# Patient Record
Sex: MALE | Race: OTHER | Employment: OTHER | URBAN - METROPOLITAN AREA
[De-identification: names, ages, dates, MRNs, and addresses within clinical notes are randomized per-mention and may not be internally consistent; named-entity substitution may affect disease eponyms.]

---

## 2017-03-20 PROBLEM — I71.20 THORACIC AORTIC ANEURYSM WITHOUT RUPTURE: Status: ACTIVE | Noted: 2017-03-20

## 2017-03-20 PROBLEM — I10 ESSENTIAL HYPERTENSION WITH GOAL BLOOD PRESSURE LESS THAN 130/80: Status: ACTIVE | Noted: 2017-03-20

## 2017-03-20 PROBLEM — I71.2 THORACIC AORTIC ANEURYSM WITHOUT RUPTURE (HCC): Status: ACTIVE | Noted: 2017-03-20

## 2017-03-20 PROBLEM — I71.20 THORACIC AORTIC ANEURYSM WITHOUT RUPTURE (HCC): Status: ACTIVE | Noted: 2017-03-20

## 2017-04-06 ENCOUNTER — HOSPITAL ENCOUNTER (INPATIENT)
Facility: HOSPITAL | Age: 77
LOS: 1 days | Discharge: HOME | DRG: 176 | End: 2017-04-07
Attending: EMERGENCY MEDICINE | Admitting: INTERNAL MEDICINE
Payer: MEDICARE

## 2017-04-06 DIAGNOSIS — I26.99 OTHER PULMONARY EMBOLISM WITHOUT ACUTE COR PULMONALE, UNSPECIFIED CHRONICITY (HCC): Primary | ICD-10-CM

## 2017-04-06 PROCEDURE — 85610 PROTHROMBIN TIME: CPT | Performed by: EMERGENCY MEDICINE

## 2017-04-06 PROCEDURE — 93010 ELECTROCARDIOGRAM REPORT: CPT

## 2017-04-06 PROCEDURE — 85730 THROMBOPLASTIN TIME PARTIAL: CPT | Performed by: EMERGENCY MEDICINE

## 2017-04-06 PROCEDURE — 93005 ELECTROCARDIOGRAM TRACING: CPT

## 2017-04-06 PROCEDURE — 85025 COMPLETE CBC W/AUTO DIFF WBC: CPT | Performed by: EMERGENCY MEDICINE

## 2017-04-06 PROCEDURE — 99285 EMERGENCY DEPT VISIT HI MDM: CPT

## 2017-04-06 PROCEDURE — 83880 ASSAY OF NATRIURETIC PEPTIDE: CPT | Performed by: EMERGENCY MEDICINE

## 2017-04-06 PROCEDURE — 84484 ASSAY OF TROPONIN QUANT: CPT | Performed by: EMERGENCY MEDICINE

## 2017-04-06 PROCEDURE — 96372 THER/PROPH/DIAG INJ SC/IM: CPT

## 2017-04-06 PROCEDURE — 80053 COMPREHEN METABOLIC PANEL: CPT | Performed by: EMERGENCY MEDICINE

## 2017-04-06 PROCEDURE — 36415 COLL VENOUS BLD VENIPUNCTURE: CPT

## 2017-04-06 RX ORDER — ENOXAPARIN SODIUM 100 MG/ML
100 INJECTION SUBCUTANEOUS ONCE
Status: COMPLETED | OUTPATIENT
Start: 2017-04-06 | End: 2017-04-06

## 2017-04-06 RX ORDER — POLYETHYLENE GLYCOL 3350 17 G/17G
17 POWDER, FOR SOLUTION ORAL DAILY PRN
Status: DISCONTINUED | OUTPATIENT
Start: 2017-04-06 | End: 2017-04-07

## 2017-04-06 RX ORDER — ENOXAPARIN SODIUM 100 MG/ML
1 INJECTION SUBCUTANEOUS EVERY 12 HOURS SCHEDULED
Status: DISCONTINUED | OUTPATIENT
Start: 2017-04-07 | End: 2017-04-07

## 2017-04-06 RX ORDER — TERAZOSIN 5 MG/1
5 CAPSULE ORAL NIGHTLY
Status: DISCONTINUED | OUTPATIENT
Start: 2017-04-06 | End: 2017-04-07

## 2017-04-06 RX ORDER — ACETAMINOPHEN 325 MG/1
650 TABLET ORAL EVERY 6 HOURS PRN
Status: DISCONTINUED | OUTPATIENT
Start: 2017-04-06 | End: 2017-04-07

## 2017-04-06 RX ORDER — ONDANSETRON 2 MG/ML
4 INJECTION INTRAMUSCULAR; INTRAVENOUS EVERY 6 HOURS PRN
Status: DISCONTINUED | OUTPATIENT
Start: 2017-04-06 | End: 2017-04-07

## 2017-04-06 RX ORDER — POTASSIUM CHLORIDE 20 MEQ/1
40 TABLET, EXTENDED RELEASE ORAL ONCE
Status: COMPLETED | OUTPATIENT
Start: 2017-04-06 | End: 2017-04-06

## 2017-04-06 RX ORDER — LISINOPRIL 20 MG/1
20 TABLET ORAL NIGHTLY
Status: DISCONTINUED | OUTPATIENT
Start: 2017-04-06 | End: 2017-04-07

## 2017-04-06 RX ORDER — BISACODYL 10 MG
10 SUPPOSITORY, RECTAL RECTAL
Status: DISCONTINUED | OUTPATIENT
Start: 2017-04-06 | End: 2017-04-07

## 2017-04-07 ENCOUNTER — APPOINTMENT (OUTPATIENT)
Dept: CV DIAGNOSTICS | Facility: HOSPITAL | Age: 77
DRG: 176 | End: 2017-04-07
Attending: HOSPITALIST
Payer: MEDICARE

## 2017-04-07 VITALS
SYSTOLIC BLOOD PRESSURE: 167 MMHG | BODY MASS INDEX: 30 KG/M2 | WEIGHT: 195 LBS | DIASTOLIC BLOOD PRESSURE: 84 MMHG | RESPIRATION RATE: 17 BRPM | TEMPERATURE: 98 F | HEART RATE: 62 BPM | OXYGEN SATURATION: 100 %

## 2017-04-07 PROCEDURE — 83735 ASSAY OF MAGNESIUM: CPT | Performed by: HOSPITALIST

## 2017-04-07 PROCEDURE — 80048 BASIC METABOLIC PNL TOTAL CA: CPT | Performed by: HOSPITALIST

## 2017-04-07 PROCEDURE — 85027 COMPLETE CBC AUTOMATED: CPT | Performed by: HOSPITALIST

## 2017-04-07 PROCEDURE — 93306 TTE W/DOPPLER COMPLETE: CPT

## 2017-04-07 PROCEDURE — 84132 ASSAY OF SERUM POTASSIUM: CPT | Performed by: HOSPITALIST

## 2017-04-07 PROCEDURE — 93306 TTE W/DOPPLER COMPLETE: CPT | Performed by: INTERNAL MEDICINE

## 2017-04-07 RX ORDER — POTASSIUM CHLORIDE 20 MEQ/1
40 TABLET, EXTENDED RELEASE ORAL ONCE
Status: COMPLETED | OUTPATIENT
Start: 2017-04-07 | End: 2017-04-07

## 2017-04-07 RX ORDER — TERAZOSIN 5 MG/1
5 CAPSULE ORAL NIGHTLY
Status: DISCONTINUED | OUTPATIENT
Start: 2017-04-07 | End: 2017-04-07

## 2017-04-07 RX ORDER — AMITRIPTYLINE HYDROCHLORIDE 10 MG/1
10 TABLET, FILM COATED ORAL NIGHTLY
Status: DISCONTINUED | OUTPATIENT
Start: 2017-04-07 | End: 2017-04-07

## 2017-04-07 NOTE — H&P
[facility] 37 Patient Status:  Observation    10/2/1940 MRN [id number]   [facility] 7NE-A Attending Oswaldo Roberts MD   Hosp Day # 1 PCP Jose Antonio Wray MD     Cc: abnormal CTA    History of Present [place] Cancer Father    • Cancer Mother      Social History:   reports that he quit smoking about 59 years ago. His smoking use included Cigarettes. He has never used smokeless tobacco. He reports that he drinks alcohol.  He reports that he does not use illicit dr 6. 0   HGB  14.3  13.4   MCV  84.1  85.5   PLT  168.0  159.0   INR  1.04   --        Recent Labs   Lab  04/06/17 2003 04/07/17   0511   NA  140  140   K  3.0*   --    CL  104  106   CO2  29.0  28.0   BUN  19  19   CREATSERUM  1.19  1.05   CA  8.7  8.9 UPPER ABDOMEN:  A small cyst is seen within the pancreatic tail measuring 1 cm. Low-density foci within the kidneys are too small to characterize. BONES:  Modest multilevel degenerative changes of the thoracolumbar spine are noted.  No aggressive osseous le

## 2017-04-07 NOTE — PLAN OF CARE
Assumed care @ 0700. Pt a/o x4, VSS. Tele SR. No acute respiratory distress noted. Denies any pain. Pt frequently ambulated in the aguilar. (-)BM, (+)BS. No other complaints made. Will continue to monitor.           NURSING DISCHARGE NOTE    Discharged

## 2017-04-07 NOTE — ED PROVIDER NOTES
Patient Seen in: [facility] Emergency Department    History   Patient presents with:  Abnormal Result (metabolic, cardiac)    Stated Complaint: states had CT today, told to come for (+) PE    HPI  Patient is a 14-year-old male who has history of a t MG Oral Tab,  Take 1 tablet (20 mg total) by mouth daily. omeprazole 20 MG Oral Capsule Delayed Release,  Take 1 capsule (20 mg total) by mouth once daily. Doxazosin Mesylate (CARDURA) 4 MG Oral Tab,  Take 1 tablet (4 mg total) by mouth nightly.    Garcia Sherice refill. No calf tenderness or edema  SKIN: No rash, good turgor. NEURO: Patient answers questions appropriately. No focal deficits appreciated.            ED Course     Labs Reviewed   COMP METABOLIC PANEL (14) - Abnormal; Notable for the following: Ailin Aranda from pulmonology who recommended Lovenox milligram per kilogram every 12 hours. Patient will be admitted for further evaluation.   I did speak with the [facility] hospitalist.        Disposition and Plan     Clinical Impression:  Other pulmonary embolism w

## 2017-04-07 NOTE — CERTIFICATION
**Certification    PHYSICIAN Certification of Need for Inpatient Hospitalization    Based on the his current state of illness, Stephanie Saeed requires inpatient hospitalization for his PE.   This requires inpatient medical treatment because the patient's se

## 2017-04-07 NOTE — DISCHARGE SUMMARY
General Medicine Discharge Summary     Patient ID:  Efren Sutton  68year old  10/2/1940    Admit date: 4/6/2017    Discharge date and time: 4/7/2017    Attending Physician: MD Miguel Morin        Consults: IP CONSULT TO PULMONOLOGY  IP CONSULT TO HOSPITALIST  IP CONSULT TO SOCIAL WORK    Radiology reports:    Cta Chest For Aortic Aneurysm Or Diss(cont Only)(cpt=71275)            (cpt=71275)    4/6/2017  CT ANGIOGRAPHY CHEST CLINICAL INFORMAT patent. Subsegmental areas of atelectasis are noted within the lung bases. No focal airspace consolidation is identified. 4/6/2017  IMPRESSION:  1. Acute bilateral pulmonary emboli. 2.  Mild ascending thoracic aortic aneurysm.  3.  Calcific atheroscler

## 2017-04-07 NOTE — ED INITIAL ASSESSMENT (HPI)
76YM c/c of PE's Pt state he had a CT today and was dx with bilateral PE Pt denies any symptoms at this time

## 2017-04-07 NOTE — PLAN OF CARE
Pt admitted at 2245. Safety precautions initiated. Bed in low position. Call light within reach. Pt was oriented to room. Pt updated of plan of care. Pt verbalized understanding.

## 2017-04-07 NOTE — CONSULTS
Pulmonary H&P/Consult       NAME: [address] [place] [address] Street: [id number] - MRN: [id number] - Age: 68year old - :  10/2/1940    Date of Admission: 2017  7:37 PM  Admission Diagnosis: Other pulmonary embolism without acute cor pulmonale, unspecified chr admission:  Atenolol-Chlorthalidone 50-25 MG Oral Tab Take 1 tablet by mouth once daily.  Disp: 90 tablet Rfl: 1 4/6/2017 at Unknown time   Amitriptyline HCl 10 MG Oral Tab TAKE 1 TABLET BY MOUTH NIGHTLY Disp: 90 tablet Rfl: 1 4/5/2017 at Unknown time   lis Scheduled Medication:  • Amitriptyline HCl  10 mg Oral Nightly   • rivaroxaban  15 mg Oral BID with meals   • atenolol-chlorthalidone (TENORETIC 50/25) combination tablet   Oral Daily Beta Blocker   • lisinopril  20 mg Oral Nightly   • Terazosin HCl mucosa, and tongue normal; teeth and gums normal   Neck:   Supple, symmetrical, trachea midline, no adenopathy;        thyroid:  No enlargement/tenderness/nodules; no carotid    bruit or JVD   Back:     Symmetric, no curvature, ROM normal, no CVA tendernes ----------    Imaging: CT chest reviewed- zane emboli noted    ASSESSMENT/PLAN:    1.  Zane PEs  -found incidentally on CT of aorta  -RF of recent car/plane trips  -echo done and results are pending  -start xarelto- continue for 3 mths  -stable to d/c home

## 2017-04-10 ENCOUNTER — TELEPHONE (OUTPATIENT)
Dept: CARDIOLOGY UNIT | Facility: HOSPITAL | Age: 77
End: 2017-04-10

## 2017-04-10 NOTE — PROGRESS NOTES
Received prior auth request for Xarelto.  Discussed with pharmacy and insurance company- had 3 prescriptions apparently- 1) for Xarelto 15mg BID for 21 days, 2) for Xarelto 20mg daily after completing prescription #1, and 3) Xarelto 10mg tabs 2 tabs by linda

## 2017-12-05 NOTE — OR NURSING
Received a call from Dr. Eliud Lorenz stating she had reviewed the EKG from April and found that is was satisfactory for the upcoming GI procedure and that there was no need to repeat EKG.

## 2017-12-11 ENCOUNTER — HOSPITAL ENCOUNTER (OUTPATIENT)
Facility: HOSPITAL | Age: 77
Setting detail: HOSPITAL OUTPATIENT SURGERY
Discharge: HOME | End: 2017-12-11
Attending: INTERNAL MEDICINE | Admitting: INTERNAL MEDICINE
Payer: MEDICARE

## 2017-12-11 ENCOUNTER — SURGERY (OUTPATIENT)
Age: 77
End: 2017-12-11

## 2017-12-11 ENCOUNTER — ANESTHESIA EVENT (OUTPATIENT)
Dept: ENDOSCOPY | Facility: HOSPITAL | Age: 77
End: 2017-12-11
Payer: MEDICARE

## 2017-12-11 ENCOUNTER — ANESTHESIA (OUTPATIENT)
Dept: ENDOSCOPY | Facility: HOSPITAL | Age: 77
End: 2017-12-11
Payer: MEDICARE

## 2017-12-11 VITALS
SYSTOLIC BLOOD PRESSURE: 113 MMHG | HEIGHT: 67 IN | BODY MASS INDEX: 31.39 KG/M2 | WEIGHT: 200 LBS | TEMPERATURE: 97 F | DIASTOLIC BLOOD PRESSURE: 63 MMHG | RESPIRATION RATE: 16 BRPM | HEART RATE: 55 BPM | OXYGEN SATURATION: 96 %

## 2017-12-11 DIAGNOSIS — K86.89 DILATION OF PANCREATIC DUCT: ICD-10-CM

## 2017-12-11 PROCEDURE — 88305 TISSUE EXAM BY PATHOLOGIST: CPT | Performed by: INTERNAL MEDICINE

## 2017-12-11 PROCEDURE — 82150 ASSAY OF AMYLASE: CPT | Performed by: INTERNAL MEDICINE

## 2017-12-11 PROCEDURE — 82378 CARCINOEMBRYONIC ANTIGEN: CPT | Performed by: INTERNAL MEDICINE

## 2017-12-11 PROCEDURE — 0DB38ZX EXCISION OF LOWER ESOPHAGUS, VIA NATURAL OR ARTIFICIAL OPENING ENDOSCOPIC, DIAGNOSTIC: ICD-10-PCS | Performed by: INTERNAL MEDICINE

## 2017-12-11 PROCEDURE — 0FBG4ZX EXCISION OF PANCREAS, PERCUTANEOUS ENDOSCOPIC APPROACH, DIAGNOSTIC: ICD-10-PCS | Performed by: INTERNAL MEDICINE

## 2017-12-11 PROCEDURE — BF4CZZZ ULTRASONOGRAPHY OF HEPATOBILIARY SYSTEM, ALL: ICD-10-PCS | Performed by: INTERNAL MEDICINE

## 2017-12-11 RX ORDER — NALOXONE HYDROCHLORIDE 0.4 MG/ML
80 INJECTION, SOLUTION INTRAMUSCULAR; INTRAVENOUS; SUBCUTANEOUS AS NEEDED
Status: DISCONTINUED | OUTPATIENT
Start: 2017-12-11 | End: 2017-12-11

## 2017-12-11 RX ORDER — SODIUM CHLORIDE, SODIUM LACTATE, POTASSIUM CHLORIDE, CALCIUM CHLORIDE 600; 310; 30; 20 MG/100ML; MG/100ML; MG/100ML; MG/100ML
INJECTION, SOLUTION INTRAVENOUS CONTINUOUS
Status: DISCONTINUED | OUTPATIENT
Start: 2017-12-11 | End: 2017-12-11

## 2017-12-11 RX ORDER — LEVOFLOXACIN 5 MG/ML
250 INJECTION, SOLUTION INTRAVENOUS ONCE
Status: DISCONTINUED | OUTPATIENT
Start: 2017-12-11 | End: 2017-12-11

## 2017-12-11 NOTE — ANESTHESIA PREPROCEDURE EVALUATION
PRE-OP EVALUATION    Patient Name: Phi Jordan    Pre-op Diagnosis: Dilation of pancreatic duct [K86.89]    Procedure(s):  ENDOSCOPIC ULTRASOUND WITH FINE NEEDLE ASPIRATION  AND ANTIBIOTICS     Surgeon(s) and Role:     Janneth Alamo MD - Primary Smoker     Types: Cigarettes    Quit date: 1/1/1958    Smokeless tobacco: Never Used    Alcohol use Yes    Comment: 2-3/MONTH       Drug use: No     Available pre-op labs reviewed.        Lab Results  Component Value Date    12/05/2017   K 3.30 (L) 12

## 2017-12-11 NOTE — BRIEF OP NOTE
Pre-Operative Diagnosis: Dilation of pancreatic duct [K86.89]     Post-Operative Diagnosis: HIATAL HERNIA, PROMINENT PROXIMAL ESOPHAGUS VEIN,     Procedure Performed:   Procedure(s):  ENDOSCOPIC ULTRASOUND WITH FINE NEEDLE ASPIRATION  , RED PATH  ESOPHA

## 2017-12-11 NOTE — OPERATIVE REPORT
[facility]    PATIENT'S NAME: Shad Mekhi   ATTENDING PHYSICIAN: Estelle Shah M.D. OPERATING PHYSICIAN: Estelle Shah M.D.    PATIENT ACCOUNT#:   [de-identified]    LOCATION:  [facility] ROOMS 4 EDWP  MEDICAL RECORD #:   [id number]       D ascending aortic aneurysm. The mucosa appeared unremarkable. The distal part of the esophagus appeared unremarkable. Random biopsies were taken from the distal esophagus. Of note, no esophageal varices were seen in the distal part.   The stomach examina Of note, the visualized portion of the right and left lobe liver showed some fatty changes. IMPRESSION:  Findings as described above. RECOMMENDATION:    1. Check final biopsy results.   2.   Continue close monitoring of the pancreatic cyst.  3.   We

## 2017-12-11 NOTE — ANESTHESIA POSTPROCEDURE EVALUATION
[facility] Patient Status:  Hospital Outpatient Surgery   Age/Gender 68year old male MRN [id number]   Location [facility]. Attending Tomy Corrales MD   Hosp Day # 0 PCP Sia Lenz MD       Anesthesia Post-op Note

## 2017-12-13 NOTE — PROGRESS NOTES
Biopsy from esophagus was normal. We need to get CT of the chest with IV to assess the prominent veins seen at time of endoscopy

## 2017-12-19 ENCOUNTER — CHARTING TRANS (OUTPATIENT)
Dept: OTHER | Age: 77
End: 2017-12-19

## 2018-03-28 ENCOUNTER — PRIOR ORIGINAL RECORDS (OUTPATIENT)
Dept: OTHER | Age: 78
End: 2018-03-28

## 2018-03-28 PROBLEM — I26.99 OTHER PULMONARY EMBOLISM WITHOUT ACUTE COR PULMONALE, UNSPECIFIED CHRONICITY (HCC): Status: RESOLVED | Noted: 2017-04-06 | Resolved: 2018-03-28

## 2018-03-28 PROBLEM — I26.99 PULMONARY EMBOLUS (HCC): Status: RESOLVED | Noted: 2017-04-06 | Resolved: 2018-03-28

## 2018-03-28 PROBLEM — Z86.711 HISTORY OF PULMONARY EMBOLISM: Status: ACTIVE | Noted: 2018-03-28

## 2018-03-28 PROBLEM — I35.8 AORTIC VALVE SCLEROSIS: Status: ACTIVE | Noted: 2018-03-28

## 2018-11-16 ENCOUNTER — PRIOR ORIGINAL RECORDS (OUTPATIENT)
Dept: OTHER | Age: 78
End: 2018-11-16

## 2018-11-16 ENCOUNTER — MYAURORA ACCOUNT LINK (OUTPATIENT)
Dept: OTHER | Age: 78
End: 2018-11-16

## 2018-11-16 LAB
ALBUMIN: 4.2 G/DL
ALKALINE PHOSPHATATE(ALK PHOS): 46 IU/L
ALKALINE PHOSPHATATE(ALK PHOS): 46 IU/L
ALT (SGPT): 23 U/L
AST (SGOT): 16 U/L
BILIRUBIN TOTAL: 0.92 MG/DL
BUN: 19 MG/DL
CALCIUM: 9.2 MG/DL
CALCIUM: 9.2 MG/DL
CHLORIDE: 102 MEQ/L
CHLORIDE: 102 MEQ/L
CHOLESTEROL, TOTAL: 168 MG/DL
CREATININE, SERUM: 1.32 MG/DL
GLUCOSE: 118 MG/DL
GLUCOSE: 118 MG/DL
HDL CHOLESTEROL: 35 MG/DL
LDL CHOLESTEROL: 97 MG/DL
POTASSIUM, SERUM: 3.4 MEQ/L
SGOT (AST): 16 IU/L
SGOT (AST): 16 IU/L
SGPT (ALT): 23 IU/L
SGPT (ALT): 23 IU/L
SODIUM: 142 MEQ/L
SODIUM: 142 MEQ/L
TRIGLYCERIDES: 180 MG/DL

## 2018-12-04 ENCOUNTER — HOSPITAL ENCOUNTER (OUTPATIENT)
Dept: CV DIAGNOSTICS | Facility: HOSPITAL | Age: 78
Discharge: HOME | End: 2018-12-04
Attending: INTERNAL MEDICINE

## 2018-12-04 ENCOUNTER — MYAURORA ACCOUNT LINK (OUTPATIENT)
Dept: OTHER | Age: 78
End: 2018-12-04

## 2018-12-04 DIAGNOSIS — I34.0 MITRAL VALVE INSUFFICIENCY, UNSPECIFIED ETIOLOGY: ICD-10-CM

## 2018-12-04 DIAGNOSIS — I10 HYPERTENSION, UNSPECIFIED TYPE: ICD-10-CM

## 2018-12-04 LAB
ALT (SGPT): 23 U/L
AST (SGOT): 16 U/L
CHOLESTEROL, TOTAL: 168 MG/DL
GLUCOSE: 118 MG/DL
HDL CHOLESTEROL: 35 MG/DL
HEMOGLOBIN A1C: 5.9 %
LDL CHOLESTEROL: 97 MG/DL
TRIGLYCERIDES: 180 MG/DL

## 2018-12-06 ENCOUNTER — PRIOR ORIGINAL RECORDS (OUTPATIENT)
Dept: OTHER | Age: 78
End: 2018-12-06

## 2018-12-10 ENCOUNTER — PRIOR ORIGINAL RECORDS (OUTPATIENT)
Dept: OTHER | Age: 78
End: 2018-12-10

## 2018-12-10 RX ORDER — AMLODIPINE BESYLATE 5 MG/1
5 TABLET ORAL DAILY
COMMUNITY
End: 2019-03-29

## 2018-12-10 NOTE — H&P
Tarsha Galarza  : 10/02/1940  ACCOUNT:  [id number]  [phone]  PCP: Dr. Vaughan Situ     TODAY'S DATE: 2018  DICTATED BY:  [Dr. Jennifer Trujillo: [high blood pressure.]    HPI:    [On 2018, Rosalinda Maguire, a 66year old male, p ALLERGIES: Penicillins - CLASS    MEDICATIONS: Selected prescriptions see below    VITAL SIGNS: [B/P - 190/92 , Pulse - 60, Weight -  203, Height -   66 , BMI - 32.8 ]    CONS: well developed, well nourished.  WEIGHT: BMI parameters reviewed and discuss anatomy. Amlodipine indications and potential side effects were discussed with the patient and his wife. They verbalize good understanding and wished to proceed with the above plan as outlined.   I will see him for follow-up again in 3 months, sooner as n

## 2018-12-11 ENCOUNTER — HOSPITAL ENCOUNTER (OUTPATIENT)
Dept: CV DIAGNOSTICS | Facility: HOSPITAL | Age: 78
Discharge: HOME | End: 2018-12-11
Attending: INTERNAL MEDICINE
Payer: MEDICARE

## 2018-12-11 ENCOUNTER — HOSPITAL ENCOUNTER (OUTPATIENT)
Dept: INTERVENTIONAL RADIOLOGY/VASCULAR | Facility: HOSPITAL | Age: 78
Discharge: HOME | End: 2018-12-11
Attending: INTERNAL MEDICINE | Admitting: INTERNAL MEDICINE
Payer: MEDICARE

## 2018-12-11 VITALS
HEIGHT: 68 IN | BODY MASS INDEX: 30.31 KG/M2 | SYSTOLIC BLOOD PRESSURE: 141 MMHG | WEIGHT: 200 LBS | HEART RATE: 51 BPM | OXYGEN SATURATION: 96 % | DIASTOLIC BLOOD PRESSURE: 69 MMHG | RESPIRATION RATE: 16 BRPM

## 2018-12-11 DIAGNOSIS — I34.0 MITRAL REGURGITATION: ICD-10-CM

## 2018-12-11 PROCEDURE — B24BZZ4 ULTRASONOGRAPHY OF HEART WITH AORTA, TRANSESOPHAGEAL: ICD-10-PCS | Performed by: INTERNAL MEDICINE

## 2018-12-11 PROCEDURE — 93312 ECHO TRANSESOPHAGEAL: CPT

## 2018-12-11 PROCEDURE — 93320 DOPPLER ECHO COMPLETE: CPT | Performed by: INTERNAL MEDICINE

## 2018-12-11 PROCEDURE — 99152 MOD SED SAME PHYS/QHP 5/>YRS: CPT

## 2018-12-11 PROCEDURE — 99153 MOD SED SAME PHYS/QHP EA: CPT

## 2018-12-11 PROCEDURE — 93325 DOPPLER ECHO COLOR FLOW MAPG: CPT | Performed by: INTERNAL MEDICINE

## 2018-12-11 RX ORDER — SODIUM CHLORIDE 9 MG/ML
INJECTION, SOLUTION INTRAVENOUS CONTINUOUS
Status: DISCONTINUED | OUTPATIENT
Start: 2018-12-11 | End: 2018-12-11

## 2018-12-11 RX ORDER — MIDAZOLAM HYDROCHLORIDE 1 MG/ML
INJECTION INTRAMUSCULAR; INTRAVENOUS
Status: COMPLETED
Start: 2018-12-11 | End: 2018-12-11

## 2018-12-11 RX ORDER — MIDAZOLAM HYDROCHLORIDE 1 MG/ML
INJECTION INTRAMUSCULAR; INTRAVENOUS
Status: DISCONTINUED
Start: 2018-12-11 | End: 2018-12-11 | Stop reason: WASHOUT

## 2018-12-11 RX ORDER — MIDAZOLAM HYDROCHLORIDE 1 MG/ML
1 INJECTION INTRAMUSCULAR; INTRAVENOUS ONCE AS NEEDED
Status: DISCONTINUED | OUTPATIENT
Start: 2018-12-11 | End: 2018-12-11

## 2018-12-11 RX ADMIN — MIDAZOLAM HYDROCHLORIDE 4 MG: 1 INJECTION INTRAMUSCULAR; INTRAVENOUS at 09:42:00

## 2018-12-11 RX ADMIN — SODIUM CHLORIDE: 9 INJECTION, SOLUTION INTRAVENOUS at 08:30:00

## 2018-12-11 NOTE — PROGRESS NOTES
[facility]  VANESSA Note    Batool Hendrickson Location:      MRN [id number]   Admission Date 12/11/2018 Operation Date 12/11/2018   Attending Physician Tori Cazares MD Operating Physician Foy Jeans, MD     Pre-Operative Diagnosis: Dilated aortic r

## 2018-12-11 NOTE — PROGRESS NOTES
Discharge instructions explained to the patient and family,questions were answered, vitals are stable, iv removed, patient went home in stable condition with family.

## 2018-12-14 ENCOUNTER — PRIOR ORIGINAL RECORDS (OUTPATIENT)
Dept: OTHER | Age: 78
End: 2018-12-14

## 2019-01-01 ENCOUNTER — EXTERNAL RECORD (OUTPATIENT)
Dept: HEALTH INFORMATION MANAGEMENT | Facility: OTHER | Age: 79
End: 2019-01-01

## 2019-02-20 ENCOUNTER — PRIOR ORIGINAL RECORDS (OUTPATIENT)
Dept: OTHER | Age: 79
End: 2019-02-20

## 2019-02-28 VITALS
BODY MASS INDEX: 31.22 KG/M2 | HEART RATE: 66 BPM | DIASTOLIC BLOOD PRESSURE: 62 MMHG | SYSTOLIC BLOOD PRESSURE: 138 MMHG | HEIGHT: 68 IN | WEIGHT: 206 LBS

## 2019-02-28 VITALS
DIASTOLIC BLOOD PRESSURE: 92 MMHG | BODY MASS INDEX: 32.62 KG/M2 | SYSTOLIC BLOOD PRESSURE: 190 MMHG | HEART RATE: 60 BPM | WEIGHT: 203 LBS | HEIGHT: 66 IN

## 2019-03-29 PROBLEM — K86.2 PANCREATIC CYST: Status: ACTIVE | Noted: 2019-03-29

## 2019-03-29 PROBLEM — I34.0 NON-RHEUMATIC MITRAL REGURGITATION: Status: ACTIVE | Noted: 2019-03-29

## 2019-03-29 PROBLEM — K86.2 PANCREATIC CYST (HCC): Status: ACTIVE | Noted: 2019-03-29

## 2019-04-03 RX ORDER — NICOTINE POLACRILEX 4 MG/1
GUM, CHEWING ORAL
COMMUNITY

## 2019-04-03 RX ORDER — AMLODIPINE BESYLATE 5 MG/1
TABLET ORAL
COMMUNITY
Start: 2018-11-16

## 2019-04-03 RX ORDER — IRBESARTAN 300 MG/1
TABLET ORAL
COMMUNITY
Start: 2018-11-16 | End: 2020-01-01 | Stop reason: ALTCHOICE

## 2019-04-03 RX ORDER — DOXAZOSIN MESYLATE 4 MG/1
TABLET ORAL
COMMUNITY
Start: 2018-11-16

## 2019-04-03 RX ORDER — ATENOLOL AND CHLORTHALIDONE TABLET 100; 25 MG/1; MG/1
TABLET ORAL
COMMUNITY
Start: 2018-11-16

## 2019-04-03 RX ORDER — AMITRIPTYLINE HYDROCHLORIDE 10 MG/1
TABLET, FILM COATED ORAL
COMMUNITY
Start: 2018-11-16

## 2019-04-03 RX ORDER — ELECTROLYTES/DEXTROSE
SOLUTION, ORAL ORAL
COMMUNITY
Start: 2018-11-16

## 2019-05-16 DIAGNOSIS — I10 BENIGN HYPERTENSION: Primary | ICD-10-CM

## 2019-05-16 DIAGNOSIS — I34.1 MITRAL VALVE PROLAPSE: ICD-10-CM

## 2019-05-29 ENCOUNTER — HOSPITAL ENCOUNTER (OUTPATIENT)
Dept: CV DIAGNOSTICS | Facility: HOSPITAL | Age: 79
Discharge: HOME | End: 2019-05-29
Attending: INTERNAL MEDICINE
Payer: MEDICARE

## 2019-05-29 DIAGNOSIS — I10 BENIGN HYPERTENSION: ICD-10-CM

## 2019-05-29 DIAGNOSIS — I34.1 MITRAL VALVE PROLAPSE: ICD-10-CM

## 2019-05-29 PROCEDURE — 93306 TTE W/DOPPLER COMPLETE: CPT | Performed by: INTERNAL MEDICINE

## 2019-05-31 ENCOUNTER — TELEPHONE (OUTPATIENT)
Dept: CARDIOLOGY | Age: 79
End: 2019-05-31

## 2019-06-18 PROBLEM — I34.0 MITRAL REGURGITATION: Status: ACTIVE | Noted: 2019-06-18

## 2019-06-18 PROBLEM — I34.1 MITRAL VALVE PROLAPSE: Status: ACTIVE | Noted: 2019-06-18

## 2019-06-18 SDOH — HEALTH STABILITY: MENTAL HEALTH: HOW OFTEN DO YOU HAVE A DRINK CONTAINING ALCOHOL?: NEVER

## 2019-06-19 ENCOUNTER — OFFICE VISIT (OUTPATIENT)
Dept: CARDIOLOGY | Age: 79
End: 2019-06-19

## 2019-06-19 VITALS
SYSTOLIC BLOOD PRESSURE: 136 MMHG | WEIGHT: 206 LBS | DIASTOLIC BLOOD PRESSURE: 60 MMHG | BODY MASS INDEX: 32.33 KG/M2 | HEIGHT: 67 IN | HEART RATE: 60 BPM

## 2019-06-19 DIAGNOSIS — I34.0 NON-RHEUMATIC MITRAL REGURGITATION: ICD-10-CM

## 2019-06-19 DIAGNOSIS — I34.1 MITRAL VALVE PROLAPSE: Primary | ICD-10-CM

## 2019-06-19 PROCEDURE — 99215 OFFICE O/P EST HI 40 MIN: CPT | Performed by: INTERNAL MEDICINE

## 2019-06-19 ASSESSMENT — ENCOUNTER SYMPTOMS
BRUISES/BLEEDS EASILY: 0
SUSPICIOUS LESIONS: 0
ALLERGIC/IMMUNOLOGIC COMMENTS: NO NEW FOOD ALLERGIES
WEIGHT GAIN: 0
COUGH: 0
HEMATOCHEZIA: 0
FEVER: 0
CHILLS: 0
WEIGHT LOSS: 0
HEMOPTYSIS: 0

## 2019-07-02 ENCOUNTER — HOSPITAL ENCOUNTER (OUTPATIENT)
Dept: CV DIAGNOSTICS | Facility: HOSPITAL | Age: 79
Discharge: HOME | End: 2019-07-02
Attending: INTERNAL MEDICINE
Payer: MEDICARE

## 2019-07-02 DIAGNOSIS — I34.1 MVP (MITRAL VALVE PROLAPSE): ICD-10-CM

## 2019-07-02 DIAGNOSIS — I10 HTN (HYPERTENSION): ICD-10-CM

## 2019-07-02 PROCEDURE — 93018 CV STRESS TEST I&R ONLY: CPT | Performed by: INTERNAL MEDICINE

## 2019-07-02 PROCEDURE — 93017 CV STRESS TEST TRACING ONLY: CPT | Performed by: INTERNAL MEDICINE

## 2019-07-05 ENCOUNTER — TELEPHONE (OUTPATIENT)
Dept: CARDIOLOGY | Age: 79
End: 2019-07-05

## 2019-12-19 ENCOUNTER — HOSPITAL ENCOUNTER (OUTPATIENT)
Dept: CV DIAGNOSTICS | Facility: HOSPITAL | Age: 79
Discharge: HOME | End: 2019-12-19
Attending: INTERNAL MEDICINE
Payer: MEDICARE

## 2019-12-19 DIAGNOSIS — I34.1 MITRAL VALVE PROLAPSE: ICD-10-CM

## 2019-12-19 DIAGNOSIS — I34.0 NONRHEUMATIC MITRAL VALVE REGURGITATION: ICD-10-CM

## 2019-12-19 PROCEDURE — 93306 TTE W/DOPPLER COMPLETE: CPT | Performed by: INTERNAL MEDICINE

## 2019-12-27 ENCOUNTER — TELEPHONE (OUTPATIENT)
Dept: CARDIOLOGY | Age: 79
End: 2019-12-27

## 2020-01-01 ENCOUNTER — EXTERNAL RECORD (OUTPATIENT)
Dept: OTHER | Age: 80
End: 2020-01-01

## 2020-01-08 ENCOUNTER — APPOINTMENT (OUTPATIENT)
Dept: CARDIOLOGY | Age: 80
End: 2020-01-08

## 2020-01-27 ENCOUNTER — OFFICE VISIT (OUTPATIENT)
Dept: CARDIOLOGY | Age: 80
End: 2020-01-27

## 2020-01-27 VITALS
BODY MASS INDEX: 31.86 KG/M2 | HEIGHT: 67 IN | SYSTOLIC BLOOD PRESSURE: 126 MMHG | HEART RATE: 60 BPM | WEIGHT: 203 LBS | DIASTOLIC BLOOD PRESSURE: 60 MMHG

## 2020-01-27 DIAGNOSIS — R73.03 PREDIABETES: Chronic | ICD-10-CM

## 2020-01-27 DIAGNOSIS — I71.21 THORACIC ASCENDING AORTIC ANEURYSM (CMD): Chronic | ICD-10-CM

## 2020-01-27 DIAGNOSIS — I34.0 NONRHEUMATIC MITRAL VALVE REGURGITATION: ICD-10-CM

## 2020-01-27 DIAGNOSIS — E78.2 HYPERLIPIDEMIA, MIXED: Chronic | ICD-10-CM

## 2020-01-27 DIAGNOSIS — I34.1 MITRAL VALVE PROLAPSE: Primary | ICD-10-CM

## 2020-01-27 PROCEDURE — 99215 OFFICE O/P EST HI 40 MIN: CPT | Performed by: INTERNAL MEDICINE

## 2020-01-27 RX ORDER — VALSARTAN 320 MG/1
320 TABLET ORAL
COMMUNITY
Start: 2019-09-19

## 2020-01-27 SDOH — SOCIAL STABILITY: SOCIAL NETWORK: ARE YOU MARRIED, WIDOWED, DIVORCED, SEPARATED, NEVER MARRIED, OR LIVING WITH A PARTNER?: MARRIED

## 2020-01-27 SDOH — HEALTH STABILITY: MENTAL HEALTH: HOW OFTEN DO YOU HAVE A DRINK CONTAINING ALCOHOL?: NOT ASKED

## 2020-01-27 SDOH — HEALTH STABILITY: PHYSICAL HEALTH: ON AVERAGE, HOW MANY DAYS PER WEEK DO YOU ENGAGE IN MODERATE TO STRENUOUS EXERCISE (LIKE A BRISK WALK)?: 0 DAYS

## 2020-01-27 SDOH — HEALTH STABILITY: PHYSICAL HEALTH: ON AVERAGE, HOW MANY MINUTES DO YOU ENGAGE IN EXERCISE AT THIS LEVEL?: 0 MIN

## 2020-01-27 ASSESSMENT — PATIENT HEALTH QUESTIONNAIRE - PHQ9
1. LITTLE INTEREST OR PLEASURE IN DOING THINGS: NOT AT ALL
SUM OF ALL RESPONSES TO PHQ9 QUESTIONS 1 AND 2: 0
SUM OF ALL RESPONSES TO PHQ9 QUESTIONS 1 AND 2: 0
2. FEELING DOWN, DEPRESSED OR HOPELESS: NOT AT ALL

## 2020-01-27 ASSESSMENT — ENCOUNTER SYMPTOMS
HEMATOCHEZIA: 0
SUSPICIOUS LESIONS: 0
WEIGHT GAIN: 0
COUGH: 0
HEMOPTYSIS: 0
WEIGHT LOSS: 0
CHILLS: 0
BRUISES/BLEEDS EASILY: 0
ALLERGIC/IMMUNOLOGIC COMMENTS: NO NEW FOOD ALLERGIES
FEVER: 0

## 2020-06-29 ENCOUNTER — TELEPHONE (OUTPATIENT)
Dept: CARDIOLOGY | Age: 80
End: 2020-06-29

## 2020-06-29 DIAGNOSIS — I34.1 MITRAL VALVE PROLAPSE: Primary | ICD-10-CM

## 2020-07-01 ENCOUNTER — LAB ENCOUNTER (OUTPATIENT)
Dept: LAB | Facility: HOSPITAL | Age: 80
End: 2020-07-01
Attending: INTERNAL MEDICINE
Payer: MEDICARE

## 2020-07-01 DIAGNOSIS — E78.2 MIXED HYPERLIPIDEMIA: ICD-10-CM

## 2020-07-01 DIAGNOSIS — R73.03 PREDIABETES: ICD-10-CM

## 2020-07-01 DIAGNOSIS — I34.1 MITRAL VALVE PROLAPSE: Primary | ICD-10-CM

## 2020-07-01 LAB
ABSOLUTE IMMATURE GRANULOCYTES (OFFPRE24): 0.02
ALBUMIN SERPL-MCNC: 3.7 G/DL
ALBUMIN SERPL-MCNC: 3.7 G/DL (ref 3.4–5)
ALBUMIN/GLOB SERPL: 1.1 {RATIO}
ALBUMIN/GLOB SERPL: 1.1 {RATIO} (ref 1–2)
ALP LIVER SERPL-CCNC: 47 U/L (ref 45–117)
ALP SERPL-CCNC: 47 U/L
ALT SERPL-CCNC: 24 U/L (ref 16–61)
ALT SERPL-CCNC: 24 UNITS/L
ANION GAP SERPL CALC-SCNC: 5 MMOL/L
ANION GAP SERPL CALC-SCNC: 5 MMOL/L (ref 0–18)
AST SERPL-CCNC: 18 U/L (ref 15–37)
AST SERPL-CCNC: 18 UNITS/L
BASOPHILS # BLD AUTO: 0.07 X10(3) UL (ref 0–0.2)
BASOPHILS # BLD: 0.07 10*3/UL
BASOPHILS NFR BLD AUTO: 1.1 %
BASOPHILS NFR BLD: 1.1 %
BILIRUB SERPL-MCNC: 0.6 MG/DL
BILIRUB SERPL-MCNC: 0.6 MG/DL (ref 0.1–2)
BUN BLD-MCNC: 20 MG/DL (ref 7–18)
BUN SERPL-MCNC: 20 MG/DL
BUN/CREAT SERPL: 14.6
BUN/CREAT SERPL: 14.6 (ref 10–20)
CALCIUM BLD-MCNC: 8.6 MG/DL (ref 8.5–10.1)
CALCIUM SERPL-MCNC: 8.6 MG/DL
CHLORIDE SERPL-SCNC: 107 MMOL/L
CHLORIDE SERPL-SCNC: 107 MMOL/L (ref 98–112)
CHOLEST SERPL-MCNC: 139 MG/DL
CHOLEST SMN-MCNC: 139 MG/DL (ref ?–200)
CO2 SERPL-SCNC: 30 MMOL/L
CO2 SERPL-SCNC: 30 MMOL/L (ref 21–32)
CREAT BLD-MCNC: 1.37 MG/DL (ref 0.7–1.3)
CREAT SERPL-MCNC: 1.37 MG/DL
DEPRECATED RDW RBC AUTO: 38.4 FL (ref 35.1–46.3)
EOSINOPHIL # BLD AUTO: 0.27 X10(3) UL (ref 0–0.7)
EOSINOPHIL # BLD: 0.27 10*3/UL
EOSINOPHIL NFR BLD AUTO: 4.2 %
EOSINOPHIL NFR BLD: 4.2 %
ERYTHROCYTE [DISTWIDTH] IN BLOOD BY AUTOMATED COUNT: 12.3 % (ref 11–15)
ERYTHROCYTE [DISTWIDTH] IN BLOOD BY AUTOMATED COUNT: 38.4 %
ERYTHROCYTE [DISTWIDTH] IN BLOOD: 12.3 %
EST. AVERAGE GLUCOSE BLD GHB EST-MCNC: 143 MG/DL
EST. AVERAGE GLUCOSE BLD GHB EST-MCNC: 143 MG/DL (ref 68–126)
GLOBULIN PLAS-MCNC: 3.5 G/DL (ref 2.8–4.4)
GLOBULIN SER-MCNC: 3.5 G/DL
GLUCOSE BLD-MCNC: 147 MG/DL (ref 70–99)
GLUCOSE SERPL-MCNC: 147 MG/DL
HBA1C MFR BLD HPLC: 6.6 % (ref ?–5.7)
HBA1C MFR BLD: 6.6 %
HCT VFR BLD AUTO: 41.7 % (ref 39–53)
HCT VFR BLD CALC: 41.7 %
HDLC SERPL-MCNC: 31 MG/DL
HDLC SERPL-MCNC: 31 MG/DL (ref 40–59)
HGB BLD-MCNC: 14.3 G/DL
HGB BLD-MCNC: 14.3 G/DL (ref 13–17.5)
IMM GRANULOCYTES # BLD AUTO: 0.02 X10(3) UL (ref 0–1)
IMM GRANULOCYTES NFR BLD: 0.3 %
IMMATURE GRANULOCYTES (OFFPRE25): 0.3
LDLC SERPL CALC-MCNC: 67 MG/DL
LDLC SERPL CALC-MCNC: 67 MG/DL (ref ?–100)
LENGTH OF FAST TIME PATIENT: YES H
LENGTH OF FAST TIME PATIENT: YES H
LYMPHOCYTES # BLD AUTO: 1.64 X10(3) UL (ref 1–4)
LYMPHOCYTES # BLD: 1.64 10*3/UL
LYMPHOCYTES NFR BLD AUTO: 25.5 %
LYMPHOCYTES NFR BLD: 25.5 %
M PROTEIN MFR SERPL ELPH: 7.2 G/DL (ref 6.4–8.2)
MCH RBC QN AUTO: 29.4 PG
MCH RBC QN AUTO: 29.4 PG (ref 26–34)
MCHC RBC AUTO-ENTMCNC: 34.3 G/DL
MCHC RBC AUTO-ENTMCNC: 34.3 G/DL (ref 31–37)
MCV RBC AUTO: 85.8 FL
MCV RBC AUTO: 85.8 FL (ref 80–100)
MONOCYTES # BLD AUTO: 0.46 X10(3) UL (ref 0.1–1)
MONOCYTES # BLD: 0.46 10*3/UL
MONOCYTES NFR BLD AUTO: 7.2 %
MONOCYTES NFR BLD: 7.2 %
NEUTROPHILS # BLD AUTO: 3.97 X10 (3) UL (ref 1.5–7.7)
NEUTROPHILS # BLD AUTO: 3.97 X10(3) UL (ref 1.5–7.7)
NEUTROPHILS # BLD: 3.97 10*3/UL
NEUTROPHILS NFR BLD AUTO: 61.7 %
NEUTROPHILS NFR BLD: 61.7 %
NONHDLC SERPL-MCNC: 108 MG/DL
NONHDLC SERPL-MCNC: 108 MG/DL (ref ?–130)
OSMOLALITY SERPL CALC.SUM OF ELEC: 299 MOSM/KG (ref 275–295)
PATIENT FASTING Y/N/NP: YES
PATIENT FASTING Y/N/NP: YES
PLATELET # BLD AUTO: 165 10(3)UL (ref 150–450)
PLATELET # BLD: 165 K/MCL
POTASSIUM SERPL-SCNC: 3.3 MMOL/L
POTASSIUM SERPL-SCNC: 3.3 MMOL/L (ref 3.5–5.1)
PROT SERPL-MCNC: 7.2 G/DL
RBC # BLD AUTO: 4.86 X10(6)UL (ref 3.8–5.8)
RBC # BLD: 4.86 10*6/UL
SODIUM SERPL-SCNC: 142 MMOL/L
SODIUM SERPL-SCNC: 142 MMOL/L (ref 136–145)
TRIGL SERPL-MCNC: 205 MG/DL
TRIGL SERPL-MCNC: 205 MG/DL (ref 30–149)
VLDLC SERPL CALC-MCNC: 41 MG/DL
VLDLC SERPL CALC-MCNC: 41 MG/DL (ref 0–30)
WBC # BLD AUTO: 6.4 X10(3) UL (ref 4–11)
WBC # BLD: 6.4 K/MCL

## 2020-07-01 PROCEDURE — 80061 LIPID PANEL: CPT

## 2020-07-01 PROCEDURE — 85025 COMPLETE CBC W/AUTO DIFF WBC: CPT

## 2020-07-01 PROCEDURE — 36415 COLL VENOUS BLD VENIPUNCTURE: CPT

## 2020-07-01 PROCEDURE — 83036 HEMOGLOBIN GLYCOSYLATED A1C: CPT

## 2020-07-01 PROCEDURE — 80053 COMPREHEN METABOLIC PANEL: CPT

## 2020-07-01 NOTE — HISTORICAL OFFICE NOTE
Progress Notes  - documented in this encounter  Madhu Crespo MD - 2020 1:15 PM CST  Formatting of this note might be different from the original.    [address] Note    Kathy Costa  : 10/2/1940  PCP: Charla Alfaro TABLET AT BEDTIME.    ASPIRIN 81 MG TABLET 1 tab daily   ATENOLOL-CHLORTHALIDONE (TENORETIC) 100-25 MG PER TABLET take 1 tab daily   CHOLECALCIFEROL (VITAMIN D3) 1000 UNITS TABLET 1 tab daily   DOXAZOSIN (CARDURA) 4 MG TABLET take 1 tab daily   MULTIPLE VIT enlarging. The aortic root does appear slightly larger compared to his prior transthoracic echo. Impression:    1. Mitral valve prolapse   2. Nonrheumatic mitral valve regurgitation   3. Thoracic ascending aortic aneurysm (CMS/HCC)   4.  Prediabetes   5

## 2020-07-02 ENCOUNTER — TELEPHONE (OUTPATIENT)
Dept: CARDIOLOGY | Age: 80
End: 2020-07-02

## 2020-07-02 ENCOUNTER — CLINICAL ABSTRACT (OUTPATIENT)
Dept: CARDIOLOGY | Age: 80
End: 2020-07-02

## 2020-07-04 ENCOUNTER — LAB ENCOUNTER (OUTPATIENT)
Dept: LAB | Facility: HOSPITAL | Age: 80
End: 2020-07-04
Attending: INTERNAL MEDICINE
Payer: MEDICARE

## 2020-07-04 DIAGNOSIS — I34.1 MVP (MITRAL VALVE PROLAPSE): ICD-10-CM

## 2020-07-05 LAB
SARS-COV-2 RNA RESP QL NAA+PROBE: NOT DETECTED
SARS-COV-2 RNA SPEC QL NAA+PROBE: NOT DETECTED
SPECIMEN SOURCE: NORMAL

## 2020-07-07 ENCOUNTER — HOSPITAL ENCOUNTER (OUTPATIENT)
Dept: INTERVENTIONAL RADIOLOGY/VASCULAR | Facility: HOSPITAL | Age: 80
Discharge: HOME | End: 2020-07-07
Attending: INTERNAL MEDICINE | Admitting: INTERNAL MEDICINE
Payer: MEDICARE

## 2020-07-07 ENCOUNTER — HOSPITAL ENCOUNTER (OUTPATIENT)
Dept: CV DIAGNOSTICS | Facility: HOSPITAL | Age: 80
Discharge: HOME | End: 2020-07-07
Attending: INTERNAL MEDICINE
Payer: MEDICARE

## 2020-07-07 VITALS
WEIGHT: 200 LBS | HEART RATE: 49 BPM | RESPIRATION RATE: 17 BRPM | BODY MASS INDEX: 31.39 KG/M2 | OXYGEN SATURATION: 95 % | DIASTOLIC BLOOD PRESSURE: 69 MMHG | HEIGHT: 67 IN | TEMPERATURE: 97 F | SYSTOLIC BLOOD PRESSURE: 120 MMHG

## 2020-07-07 DIAGNOSIS — I34.1 MVP (MITRAL VALVE PROLAPSE): Primary | ICD-10-CM

## 2020-07-07 DIAGNOSIS — I34.0 MR (MITRAL REGURGITATION): ICD-10-CM

## 2020-07-07 DIAGNOSIS — I34.1 MVP (MITRAL VALVE PROLAPSE): ICD-10-CM

## 2020-07-07 PROCEDURE — 93312 ECHO TRANSESOPHAGEAL: CPT | Performed by: INTERNAL MEDICINE

## 2020-07-07 PROCEDURE — 99153 MOD SED SAME PHYS/QHP EA: CPT

## 2020-07-07 PROCEDURE — 93325 DOPPLER ECHO COLOR FLOW MAPG: CPT | Performed by: INTERNAL MEDICINE

## 2020-07-07 PROCEDURE — B24BZZ4 ULTRASONOGRAPHY OF HEART WITH AORTA, TRANSESOPHAGEAL: ICD-10-PCS | Performed by: INTERNAL MEDICINE

## 2020-07-07 PROCEDURE — 93320 DOPPLER ECHO COMPLETE: CPT | Performed by: INTERNAL MEDICINE

## 2020-07-07 PROCEDURE — 93312 ECHO TRANSESOPHAGEAL: CPT

## 2020-07-07 PROCEDURE — 99152 MOD SED SAME PHYS/QHP 5/>YRS: CPT | Performed by: INTERNAL MEDICINE

## 2020-07-07 PROCEDURE — 99152 MOD SED SAME PHYS/QHP 5/>YRS: CPT

## 2020-07-07 PROCEDURE — 76376 3D RENDER W/INTRP POSTPROCES: CPT | Performed by: INTERNAL MEDICINE

## 2020-07-07 RX ORDER — SODIUM CHLORIDE 9 MG/ML
INJECTION, SOLUTION INTRAVENOUS
Status: DISCONTINUED | OUTPATIENT
Start: 2020-07-08 | End: 2020-07-07

## 2020-07-07 RX ORDER — MIDAZOLAM HYDROCHLORIDE 1 MG/ML
INJECTION INTRAMUSCULAR; INTRAVENOUS
Status: COMPLETED
Start: 2020-07-07 | End: 2020-07-07

## 2020-07-07 NOTE — PROGRESS NOTES
[facility]  VANESSA Note    [facility] Location:      MRN [id number]   Admission Date 7/7/2020 Operation Date 7/7/2020   Attending Physician Caty Canela MD Operating Physician Georgie Gutierrez MD     Pre-Operative Diagnosis: MVP with Aniya Conte

## 2020-07-07 NOTE — PROGRESS NOTES
Patient here for elective VANESSA. Informed consent obtained. Patient prepped per protocol. Dr Enrico Gowers at bedside to speak with patient and wife prior to procedure. When ready, patient sedated and VANESSA performed by Dr Enrico Gowers. Patient tolerated well.  See bedside se

## 2020-07-07 NOTE — H&P
[facility]  H and Sosa Song Patient Status:  Outpatient in a Bed    10/2/1940 MRN [id number]   Location [facility] Attending Errol Wolf MD   Hosp Day # 0 PCP Gi León MD     Subjective:  See OP note

## 2020-07-09 ENCOUNTER — TELEPHONE (OUTPATIENT)
Dept: CARDIOLOGY CLINIC | Facility: HOSPITAL | Age: 80
End: 2020-07-09

## 2020-07-14 ENCOUNTER — CLINICAL ABSTRACT (OUTPATIENT)
Dept: HEALTH INFORMATION MANAGEMENT | Age: 80
End: 2020-07-14

## 2020-07-15 ENCOUNTER — OFFICE VISIT (OUTPATIENT)
Dept: CARDIOLOGY | Age: 80
End: 2020-07-15

## 2020-07-15 ENCOUNTER — APPOINTMENT (OUTPATIENT)
Dept: CARDIOLOGY | Age: 80
End: 2020-07-15

## 2020-07-15 VITALS
WEIGHT: 202 LBS | HEIGHT: 67 IN | HEART RATE: 60 BPM | BODY MASS INDEX: 31.71 KG/M2 | SYSTOLIC BLOOD PRESSURE: 138 MMHG | DIASTOLIC BLOOD PRESSURE: 60 MMHG

## 2020-07-15 DIAGNOSIS — E78.2 HYPERLIPIDEMIA, MIXED: Chronic | ICD-10-CM

## 2020-07-15 DIAGNOSIS — I71.21 THORACIC ASCENDING AORTIC ANEURYSM (CMD): Chronic | ICD-10-CM

## 2020-07-15 DIAGNOSIS — I34.1 MITRAL VALVE PROLAPSE: ICD-10-CM

## 2020-07-15 DIAGNOSIS — I34.0 NONRHEUMATIC MITRAL VALVE REGURGITATION: Primary | ICD-10-CM

## 2020-07-15 DIAGNOSIS — R73.03 PREDIABETES: Chronic | ICD-10-CM

## 2020-07-15 PROCEDURE — 99215 OFFICE O/P EST HI 40 MIN: CPT | Performed by: INTERNAL MEDICINE

## 2020-07-15 ASSESSMENT — PATIENT HEALTH QUESTIONNAIRE - PHQ9
SUM OF ALL RESPONSES TO PHQ9 QUESTIONS 1 AND 2: 0
2. FEELING DOWN, DEPRESSED OR HOPELESS: NOT AT ALL
CLINICAL INTERPRETATION OF PHQ2 SCORE: NO FURTHER SCREENING NEEDED
1. LITTLE INTEREST OR PLEASURE IN DOING THINGS: NOT AT ALL
SUM OF ALL RESPONSES TO PHQ9 QUESTIONS 1 AND 2: 0
CLINICAL INTERPRETATION OF PHQ9 SCORE: NO FURTHER SCREENING NEEDED

## 2020-08-20 PROBLEM — E87.6 HYPOKALEMIA: Status: ACTIVE | Noted: 2020-08-20

## 2022-01-26 PROBLEM — M19.011 LOCALIZED OSTEOARTHRITIS OF RIGHT SHOULDER: Status: ACTIVE | Noted: 2022-01-26

## 2022-11-15 ENCOUNTER — OFFICE VISIT (OUTPATIENT)
Facility: LOCATION | Age: 82
End: 2022-11-15
Payer: MEDICARE

## 2022-11-15 DIAGNOSIS — H90.3 SENSORINEURAL HEARING LOSS (SNHL) OF BOTH EARS: Primary | ICD-10-CM

## 2022-11-15 DIAGNOSIS — H61.23 CERUMEN DEBRIS ON TYMPANIC MEMBRANE OF BOTH EARS: ICD-10-CM

## 2022-11-15 PROCEDURE — 92567 TYMPANOMETRY: CPT | Performed by: AUDIOLOGIST

## 2022-11-15 PROCEDURE — 99203 OFFICE O/P NEW LOW 30 MIN: CPT | Performed by: OTOLARYNGOLOGY

## 2022-11-15 PROCEDURE — 92557 COMPREHENSIVE HEARING TEST: CPT | Performed by: AUDIOLOGIST

## 2022-11-15 NOTE — PROGRESS NOTES
Sarojnayeli Zeng was seen for an audiometric evaluation and tympanogram today. Referred back to physician. Hearing aid evaluation post medical clearance.     Harman Drew MS, CCC-A

## 2025-01-06 ENCOUNTER — LAB ENCOUNTER (OUTPATIENT)
Dept: LAB | Age: 85
End: 2025-01-06
Attending: STUDENT IN AN ORGANIZED HEALTH CARE EDUCATION/TRAINING PROGRAM
Payer: MEDICARE

## 2025-01-06 DIAGNOSIS — I34.1 MITRAL VALVE PROLAPSE: ICD-10-CM

## 2025-01-06 DIAGNOSIS — E78.00 PURE HYPERCHOLESTEROLEMIA: ICD-10-CM

## 2025-01-06 DIAGNOSIS — I10 ESSENTIAL HYPERTENSION, MALIGNANT: Primary | ICD-10-CM

## 2025-01-06 LAB
ALBUMIN SERPL-MCNC: 4.1 G/DL (ref 3.2–4.8)
ALBUMIN/GLOB SERPL: 1.5 {RATIO} (ref 1–2)
ALP LIVER SERPL-CCNC: 45 U/L
ALT SERPL-CCNC: 13 U/L
ANION GAP SERPL CALC-SCNC: 9 MMOL/L (ref 0–18)
AST SERPL-CCNC: 19 U/L (ref ?–34)
BILIRUB SERPL-MCNC: 0.8 MG/DL (ref 0.2–1.1)
BUN BLD-MCNC: 18 MG/DL (ref 9–23)
CALCIUM BLD-MCNC: 9.8 MG/DL (ref 8.7–10.4)
CHLORIDE SERPL-SCNC: 104 MMOL/L (ref 98–112)
CHOLEST SERPL-MCNC: 155 MG/DL (ref ?–200)
CO2 SERPL-SCNC: 27 MMOL/L (ref 21–32)
CREAT BLD-MCNC: 1.36 MG/DL
EGFRCR SERPLBLD CKD-EPI 2021: 51 ML/MIN/1.73M2 (ref 60–?)
ERYTHROCYTE [DISTWIDTH] IN BLOOD BY AUTOMATED COUNT: 13.1 %
FASTING PATIENT LIPID ANSWER: YES
FASTING STATUS PATIENT QL REPORTED: YES
GLOBULIN PLAS-MCNC: 2.7 G/DL (ref 2–3.5)
GLUCOSE BLD-MCNC: 265 MG/DL (ref 70–99)
HCT VFR BLD AUTO: 40.7 %
HDLC SERPL-MCNC: 38 MG/DL (ref 40–59)
HGB BLD-MCNC: 13.5 G/DL
LDLC SERPL CALC-MCNC: 94 MG/DL (ref ?–100)
MCH RBC QN AUTO: 29.6 PG (ref 26–34)
MCHC RBC AUTO-ENTMCNC: 33.2 G/DL (ref 31–37)
MCV RBC AUTO: 89.3 FL
NONHDLC SERPL-MCNC: 117 MG/DL (ref ?–130)
OSMOLALITY SERPL CALC.SUM OF ELEC: 301 MOSM/KG (ref 275–295)
PLATELET # BLD AUTO: 147 10(3)UL (ref 150–450)
POTASSIUM SERPL-SCNC: 4 MMOL/L (ref 3.5–5.1)
PROT SERPL-MCNC: 6.8 G/DL (ref 5.7–8.2)
RBC # BLD AUTO: 4.56 X10(6)UL
SODIUM SERPL-SCNC: 140 MMOL/L (ref 136–145)
TRIGL SERPL-MCNC: 130 MG/DL (ref 30–149)
TSI SER-ACNC: 1.82 UIU/ML (ref 0.55–4.78)
VLDLC SERPL CALC-MCNC: 21 MG/DL (ref 0–30)
WBC # BLD AUTO: 8.1 X10(3) UL (ref 4–11)

## 2025-01-06 PROCEDURE — 84443 ASSAY THYROID STIM HORMONE: CPT

## 2025-01-06 PROCEDURE — 80053 COMPREHEN METABOLIC PANEL: CPT

## 2025-01-06 PROCEDURE — 85027 COMPLETE CBC AUTOMATED: CPT

## 2025-01-06 PROCEDURE — 80061 LIPID PANEL: CPT

## 2025-01-06 PROCEDURE — 36415 COLL VENOUS BLD VENIPUNCTURE: CPT

## 2025-01-19 ENCOUNTER — HOSPITAL ENCOUNTER (EMERGENCY)
Facility: HOSPITAL | Age: 85
Discharge: HOME | End: 2025-01-19
Attending: EMERGENCY MEDICINE
Payer: MEDICARE

## 2025-01-19 VITALS
BODY MASS INDEX: 32 KG/M2 | OXYGEN SATURATION: 98 % | DIASTOLIC BLOOD PRESSURE: 83 MMHG | SYSTOLIC BLOOD PRESSURE: 165 MMHG | RESPIRATION RATE: 18 BRPM | TEMPERATURE: 99 F | HEART RATE: 64 BPM | WEIGHT: 207.25 LBS

## 2025-01-19 DIAGNOSIS — R31.9 HEMATURIA, UNSPECIFIED TYPE: ICD-10-CM

## 2025-01-19 DIAGNOSIS — R33.9 URINARY RETENTION: Primary | ICD-10-CM

## 2025-01-19 LAB
GLUCOSE BLD-MCNC: 285 MG/DL (ref 70–99)
RBC #/AREA URNS AUTO: >10 /HPF
SP GR UR REFRACTOMETRY: 1.01 (ref 1–1.03)
WBC CLUMPS UR QL AUTO: PRESENT /HPF

## 2025-01-19 PROCEDURE — 81001 URINALYSIS AUTO W/SCOPE: CPT | Performed by: EMERGENCY MEDICINE

## 2025-01-19 PROCEDURE — 87086 URINE CULTURE/COLONY COUNT: CPT | Performed by: EMERGENCY MEDICINE

## 2025-01-19 PROCEDURE — 82962 GLUCOSE BLOOD TEST: CPT

## 2025-01-19 PROCEDURE — 99283 EMERGENCY DEPT VISIT LOW MDM: CPT

## 2025-01-19 RX ORDER — SULFAMETHOXAZOLE AND TRIMETHOPRIM 800; 160 MG/1; MG/1
1 TABLET ORAL 2 TIMES DAILY
Qty: 14 TABLET | Refills: 0 | Status: SHIPPED | OUTPATIENT
Start: 2025-01-19 | End: 2025-01-26

## 2025-01-19 NOTE — ED PROVIDER NOTES
Patient Seen in: [facility] Emergency Department      History     Chief Complaint   Patient presents with    Urinary Symptoms            Stated Complaint:     Subjective:   HPI      Patient presents with urinary discomfort.  The patient has had urinary discomfort over the past several weeks, increasing in discomfort and difficulty urinating particularly in the past 24 hours.  The patient has had no nausea or vomiting.  He has had no fever or chills.  He has no flank pain.  The patient has had previous prostatic problems but has not seen a urologist over the past several years.    Objective:     Past Medical History:    Actinic keratoses    Colon polyp    DEPRESSION    Esophageal reflux    Gastritis    Gastro-esophageal reflux disease with esophagitis    Glucose intolerance    Hiatal hernia    High blood pressure    HYPERTRIGLYCERIDEMIA    LAE (left atrial enlargement)    LAE (left atrial enlargement)    Left ventricular diastolic dysfunction with preserved systolic function    LVH (left ventricular hypertrophy)    Mild ascending aorta dilation (HCC)    Osteoarthrosis, unspecified whether generalized or localized, unspecified site    Other and unspecified hyperlipidemia    Pancreas cyst (HCC)    bx    Pulmonary embolism (HCC)    RENAL DISEASE    RLS (restless legs syndrome)    Synovial cyst of lumbar facet joint    Unspecified essential hypertension    Vasovagal syncope    Visual impairment    glasses              Past Surgical History:   Procedure Laterality Date    Colonoscopy  2010    POLYP/5YRS, Dr Garcia    Egd and esd - internal  2017    Esophageal bx benign, pancreatic cyst aspirartion.  Dr Holland    Tonsillectomy      Upper gi endoscopy - referral  6/22/10                Social History     Socioeconomic History    Marital status:    Tobacco Use    Smoking status: Former     Current packs/day: 0.00     Types: Cigarettes     Quit date: 1958     Years since quittin.0     Smokeless tobacco: Never   Substance and Sexual Activity    Alcohol use: Yes     Comment: 2-3/MONTH    Drug use: No    Sexual activity: Yes     Comment:      Social Drivers of Health     Physical Activity: Inactive (1/27/2020)    Received from [facility]    Exercise Vital Sign     Days of Exercise per Week: 0 days     Minutes of Exercise per Session: 0 min                  Physical Exam     ED Triage Vitals [01/19/25 1250]   BP (!) 165/83   Pulse 65   Resp 16   Temp 98.7 °F (37.1 °C)   Temp src    SpO2 100 %   O2 Device None (Room air)       Current Vitals:   Vital Signs  BP: (!) 165/83  Pulse: 64  Resp: 18  Temp: 98.7 °F (37.1 °C)    Oxygen Therapy  SpO2: 98 %  O2 Device: None (Room air)        Physical Exam  Vitals and nursing note reviewed.   Constitutional:       General: He is not in acute distress.     Appearance: He is well-developed. He is not ill-appearing.   Abdominal:      Comments: Patient has suprapubic fullness and discomfort.  There is no flank discomfort noted.  Patient has no peritoneal findings.   Neurological:      Mental Status: He is alert and oriented to person, place, and time.            ED Course     Labs Reviewed   URINALYSIS WITH CULTURE REFLEX - Abnormal; Notable for the following components:       Result Value    Urine Color Red (*)     Clarity Urine Ex.Turbid (*)     WBC Urine 21-50 (*)     RBC Urine >10 (*)     WBC Clump Present (*)     All other components within normal limits   POCT GLUCOSE - Abnormal; Notable for the following components:    POC Glucose 285 (*)     All other components within normal limits   SPECIFIC GRAVITY, URINE - Normal   URINE CULTURE, ROUTINE       ED Course as of 01/19/25 1426  ------------------------------------------------------------  Time: 01/19 1255  Comment: Morocho catheter was inserted with almost immediate yield of over a liter of urine.  Patient had immediate relief of his  symptoms.  ------------------------------------------------------------  Time: 01/19 1353  Value: Urinalysis with Culture Reflex(!)  Comment: Hematuria.  Minimal pyuria.              MDM      Patient presents to the emergency department with suprapubic discomfort.  The patient has had previous history of urinary hesitancy.  Differential diagnosis includes acute urinary retention secondary to prostatic problems.  Also considered was renal colic.  Patient did have immediate relief with insertion of a Morocho catheter, yielding substantial volume of urine.  Patient was noted to have hematuria on urinalysis along with some pyuria.  Patient was discharged home with indwelling Morocho catheter, leg bag and prescription for antibiotics.  He is strongly counseled to follow-up closely with his urologist and to return for any acute change or worsening of symptoms.        Medical Decision Making      Disposition and Plan     Clinical Impression:  1. Urinary retention    2. Hematuria, unspecified type         Disposition:  Discharge  1/19/2025  1:56 pm    Follow-up:  [facility]   [address] [place] [id number]  [phone]  Call in 1 day(s)            Medications Prescribed:  Discharge Medication List as of 1/19/2025  2:07 PM        START taking these medications    Details   sulfamethoxazole-trimethoprim -160 MG Oral Tab per tablet Take 1 tablet by mouth 2 (two) times daily for 7 days., Normal, Disp-14 tablet, R-0                 Supplementary Documentation:

## 2025-01-19 NOTE — ED QUICK NOTES
Patient's bender bag changed from drainage bag to leg bag with teaching provided to patient and wife.

## 2025-01-27 ENCOUNTER — LAB ENCOUNTER (OUTPATIENT)
Dept: LAB | Facility: HOSPITAL | Age: 85
End: 2025-01-27
Attending: STUDENT IN AN ORGANIZED HEALTH CARE EDUCATION/TRAINING PROGRAM
Payer: MEDICARE

## 2025-01-27 DIAGNOSIS — I35.8 AORTIC VALVE SCLEROSIS: Primary | ICD-10-CM

## 2025-01-27 DIAGNOSIS — R73.02 GLUCOSE INTOLERANCE (IMPAIRED GLUCOSE TOLERANCE): ICD-10-CM

## 2025-01-27 DIAGNOSIS — I10 ESSENTIAL HYPERTENSION WITH GOAL BLOOD PRESSURE LESS THAN 130/80: ICD-10-CM

## 2025-01-27 DIAGNOSIS — E78.1 PURE HYPERGLYCERIDEMIA: ICD-10-CM

## 2025-01-27 DIAGNOSIS — E78.00 PURE HYPERCHOLESTEROLEMIA: ICD-10-CM

## 2025-01-27 LAB
ANION GAP SERPL CALC-SCNC: 7 MMOL/L (ref 0–18)
BASOPHILS # BLD AUTO: 0.06 X10(3) UL (ref 0–0.2)
BASOPHILS NFR BLD AUTO: 1 %
BUN BLD-MCNC: 19 MG/DL (ref 9–23)
CALCIUM BLD-MCNC: 9.3 MG/DL (ref 8.7–10.6)
CHLORIDE SERPL-SCNC: 104 MMOL/L (ref 98–112)
CO2 SERPL-SCNC: 28 MMOL/L (ref 21–32)
CREAT BLD-MCNC: 1.32 MG/DL
EGFRCR SERPLBLD CKD-EPI 2021: 53 ML/MIN/1.73M2 (ref 60–?)
EOSINOPHIL # BLD AUTO: 0.14 X10(3) UL (ref 0–0.7)
EOSINOPHIL NFR BLD AUTO: 2.4 %
ERYTHROCYTE [DISTWIDTH] IN BLOOD BY AUTOMATED COUNT: 12.6 %
FASTING STATUS PATIENT QL REPORTED: NO
GLUCOSE BLD-MCNC: 196 MG/DL (ref 70–99)
HCT VFR BLD AUTO: 39 %
HGB BLD-MCNC: 13.1 G/DL
IMM GRANULOCYTES # BLD AUTO: 0.01 X10(3) UL (ref 0–1)
IMM GRANULOCYTES NFR BLD: 0.2 %
LYMPHOCYTES # BLD AUTO: 0.94 X10(3) UL (ref 1–4)
LYMPHOCYTES NFR BLD AUTO: 16.2 %
MCH RBC QN AUTO: 29.6 PG (ref 26–34)
MCHC RBC AUTO-ENTMCNC: 33.6 G/DL (ref 31–37)
MCV RBC AUTO: 88 FL
MONOCYTES # BLD AUTO: 0.45 X10(3) UL (ref 0.1–1)
MONOCYTES NFR BLD AUTO: 7.7 %
NEUTROPHILS # BLD AUTO: 4.21 X10 (3) UL (ref 1.5–7.7)
NEUTROPHILS # BLD AUTO: 4.21 X10(3) UL (ref 1.5–7.7)
NEUTROPHILS NFR BLD AUTO: 72.5 %
OSMOLALITY SERPL CALC.SUM OF ELEC: 296 MOSM/KG (ref 275–295)
PLATELET # BLD AUTO: 130 10(3)UL (ref 150–450)
PLATELETS.RETICULATED NFR BLD AUTO: 3.6 % (ref 0–7)
POTASSIUM SERPL-SCNC: 4.4 MMOL/L (ref 3.5–5.1)
RBC # BLD AUTO: 4.43 X10(6)UL
SODIUM SERPL-SCNC: 139 MMOL/L (ref 136–145)
WBC # BLD AUTO: 5.8 X10(3) UL (ref 4–11)

## 2025-01-27 PROCEDURE — 85025 COMPLETE CBC W/AUTO DIFF WBC: CPT

## 2025-01-27 PROCEDURE — 80048 BASIC METABOLIC PNL TOTAL CA: CPT

## 2025-01-27 PROCEDURE — 36415 COLL VENOUS BLD VENIPUNCTURE: CPT

## 2025-02-06 ENCOUNTER — HOSPITAL ENCOUNTER (EMERGENCY)
Facility: HOSPITAL | Age: 85
Discharge: HOME | End: 2025-02-06
Attending: EMERGENCY MEDICINE
Payer: MEDICARE

## 2025-02-06 VITALS
HEIGHT: 67 IN | WEIGHT: 170 LBS | RESPIRATION RATE: 17 BRPM | SYSTOLIC BLOOD PRESSURE: 178 MMHG | HEART RATE: 70 BPM | TEMPERATURE: 97 F | OXYGEN SATURATION: 100 % | DIASTOLIC BLOOD PRESSURE: 73 MMHG | BODY MASS INDEX: 26.68 KG/M2

## 2025-02-06 DIAGNOSIS — N21.0 BLADDER STONES: ICD-10-CM

## 2025-02-06 DIAGNOSIS — K86.89 PANCREATIC MASS (HCC): Primary | ICD-10-CM

## 2025-02-06 DIAGNOSIS — R63.4 UNINTENTIONAL WEIGHT LOSS: ICD-10-CM

## 2025-02-06 LAB
ALBUMIN SERPL-MCNC: 4.5 G/DL (ref 3.2–4.8)
ALBUMIN/GLOB SERPL: 1.7 {RATIO} (ref 1–2)
ALP LIVER SERPL-CCNC: 58 U/L
ALT SERPL-CCNC: 8 U/L
ANION GAP SERPL CALC-SCNC: 11 MMOL/L (ref 0–18)
AST SERPL-CCNC: 17 U/L (ref ?–34)
BASOPHILS # BLD AUTO: 0.06 X10(3) UL (ref 0–0.2)
BASOPHILS NFR BLD AUTO: 0.7 %
BILIRUB SERPL-MCNC: 1 MG/DL (ref 0.2–1.1)
BILIRUB UR QL STRIP.AUTO: NEGATIVE
BUN BLD-MCNC: 17 MG/DL (ref 9–23)
CALCIUM BLD-MCNC: 9.2 MG/DL (ref 8.7–10.6)
CHLORIDE SERPL-SCNC: 103 MMOL/L (ref 98–112)
CO2 SERPL-SCNC: 25 MMOL/L (ref 21–32)
CREAT BLD-MCNC: 1.24 MG/DL
EGFRCR SERPLBLD CKD-EPI 2021: 57 ML/MIN/1.73M2 (ref 60–?)
EOSINOPHIL # BLD AUTO: 0.13 X10(3) UL (ref 0–0.7)
EOSINOPHIL NFR BLD AUTO: 1.4 %
ERYTHROCYTE [DISTWIDTH] IN BLOOD BY AUTOMATED COUNT: 12.5 %
GLOBULIN PLAS-MCNC: 2.7 G/DL (ref 2–3.5)
GLUCOSE BLD-MCNC: 183 MG/DL (ref 70–99)
GLUCOSE UR STRIP.AUTO-MCNC: 50 MG/DL
HCT VFR BLD AUTO: 38.3 %
HGB BLD-MCNC: 13.4 G/DL
IMM GRANULOCYTES # BLD AUTO: 0.03 X10(3) UL (ref 0–1)
IMM GRANULOCYTES NFR BLD: 0.3 %
KETONES UR STRIP.AUTO-MCNC: 10 MG/DL
LEUKOCYTE ESTERASE UR QL STRIP.AUTO: 25
LIPASE SERPL-CCNC: 36 U/L (ref 12–53)
LYMPHOCYTES # BLD AUTO: 1.2 X10(3) UL (ref 1–4)
LYMPHOCYTES NFR BLD AUTO: 13.1 %
MCH RBC QN AUTO: 30 PG (ref 26–34)
MCHC RBC AUTO-ENTMCNC: 35 G/DL (ref 31–37)
MCV RBC AUTO: 85.7 FL
MONOCYTES # BLD AUTO: 0.68 X10(3) UL (ref 0.1–1)
MONOCYTES NFR BLD AUTO: 7.4 %
NEUTROPHILS # BLD AUTO: 7.03 X10 (3) UL (ref 1.5–7.7)
NEUTROPHILS # BLD AUTO: 7.03 X10(3) UL (ref 1.5–7.7)
NEUTROPHILS NFR BLD AUTO: 77.1 %
OSMOLALITY SERPL CALC.SUM OF ELEC: 294 MOSM/KG (ref 275–295)
PH UR STRIP.AUTO: 5.5 [PH] (ref 5–8)
PLATELET # BLD AUTO: 148 10(3)UL (ref 150–450)
PLATELETS.RETICULATED NFR BLD AUTO: 3.5 % (ref 0–7)
POTASSIUM SERPL-SCNC: 3.8 MMOL/L (ref 3.5–5.1)
PROT SERPL-MCNC: 7.2 G/DL (ref 5.7–8.2)
PROT UR STRIP.AUTO-MCNC: 100 MG/DL
RBC # BLD AUTO: 4.47 X10(6)UL
RBC #/AREA URNS AUTO: >10 /HPF
SODIUM SERPL-SCNC: 139 MMOL/L (ref 136–145)
SP GR UR STRIP.AUTO: 1.02 (ref 1–1.03)
UROBILINOGEN UR STRIP.AUTO-MCNC: NORMAL MG/DL
WBC # BLD AUTO: 9.1 X10(3) UL (ref 4–11)

## 2025-02-06 PROCEDURE — 99285 EMERGENCY DEPT VISIT HI MDM: CPT

## 2025-02-06 PROCEDURE — 80053 COMPREHEN METABOLIC PANEL: CPT

## 2025-02-06 PROCEDURE — 81001 URINALYSIS AUTO W/SCOPE: CPT | Performed by: EMERGENCY MEDICINE

## 2025-02-06 PROCEDURE — 96374 THER/PROPH/DIAG INJ IV PUSH: CPT

## 2025-02-06 PROCEDURE — 99284 EMERGENCY DEPT VISIT MOD MDM: CPT

## 2025-02-06 PROCEDURE — 85025 COMPLETE CBC W/AUTO DIFF WBC: CPT

## 2025-02-06 PROCEDURE — 85025 COMPLETE CBC W/AUTO DIFF WBC: CPT | Performed by: EMERGENCY MEDICINE

## 2025-02-06 PROCEDURE — 83690 ASSAY OF LIPASE: CPT

## 2025-02-06 PROCEDURE — 80053 COMPREHEN METABOLIC PANEL: CPT | Performed by: EMERGENCY MEDICINE

## 2025-02-06 PROCEDURE — 83690 ASSAY OF LIPASE: CPT | Performed by: EMERGENCY MEDICINE

## 2025-02-06 PROCEDURE — 96361 HYDRATE IV INFUSION ADD-ON: CPT

## 2025-02-06 RX ORDER — SENNOSIDES 8.6 MG
17.2 TABLET ORAL NIGHTLY PRN
Status: CANCELLED | OUTPATIENT
Start: 2025-02-06

## 2025-02-06 RX ORDER — HYDRALAZINE HYDROCHLORIDE 20 MG/ML
5 INJECTION INTRAMUSCULAR; INTRAVENOUS ONCE
Status: COMPLETED | OUTPATIENT
Start: 2025-02-06 | End: 2025-02-06

## 2025-02-06 RX ORDER — DEXTROSE MONOHYDRATE AND SODIUM CHLORIDE 5; .45 G/100ML; G/100ML
INJECTION, SOLUTION INTRAVENOUS CONTINUOUS
Status: CANCELLED | OUTPATIENT
Start: 2025-02-06 | End: 2025-02-06

## 2025-02-06 RX ORDER — HYDROCODONE BITARTRATE AND ACETAMINOPHEN 5; 325 MG/1; MG/1
2 TABLET ORAL EVERY 4 HOURS PRN
Status: CANCELLED | OUTPATIENT
Start: 2025-02-06

## 2025-02-06 RX ORDER — PHENAZOPYRIDINE HYDROCHLORIDE 100 MG/1
100 TABLET, FILM COATED ORAL 3 TIMES DAILY PRN
Qty: 15 TABLET | Refills: 0 | Status: SHIPPED | OUTPATIENT
Start: 2025-02-06 | End: 2025-02-13

## 2025-02-06 RX ORDER — MORPHINE SULFATE 2 MG/ML
1 INJECTION, SOLUTION INTRAMUSCULAR; INTRAVENOUS EVERY 2 HOUR PRN
Status: CANCELLED | OUTPATIENT
Start: 2025-02-06

## 2025-02-06 RX ORDER — METOCLOPRAMIDE HYDROCHLORIDE 5 MG/ML
5 INJECTION INTRAMUSCULAR; INTRAVENOUS EVERY 8 HOURS PRN
Status: CANCELLED | OUTPATIENT
Start: 2025-02-06

## 2025-02-06 RX ORDER — MORPHINE SULFATE 2 MG/ML
2 INJECTION, SOLUTION INTRAMUSCULAR; INTRAVENOUS EVERY 2 HOUR PRN
Status: CANCELLED | OUTPATIENT
Start: 2025-02-06

## 2025-02-06 RX ORDER — ONDANSETRON 2 MG/ML
4 INJECTION INTRAMUSCULAR; INTRAVENOUS EVERY 6 HOURS PRN
Status: CANCELLED | OUTPATIENT
Start: 2025-02-06

## 2025-02-06 RX ORDER — BISACODYL 10 MG
10 SUPPOSITORY, RECTAL RECTAL
Status: CANCELLED | OUTPATIENT
Start: 2025-02-06

## 2025-02-06 RX ORDER — SODIUM CHLORIDE 9 MG/ML
125 INJECTION, SOLUTION INTRAVENOUS CONTINUOUS
Status: DISCONTINUED | OUTPATIENT
Start: 2025-02-06 | End: 2025-02-06

## 2025-02-06 RX ORDER — FINASTERIDE 5 MG/1
5 TABLET, FILM COATED ORAL DAILY
COMMUNITY

## 2025-02-06 RX ORDER — FINASTERIDE 5 MG/1
5 TABLET, FILM COATED ORAL DAILY
COMMUNITY
End: 2025-02-06 | Stop reason: CLARIF

## 2025-02-06 RX ORDER — POLYETHYLENE GLYCOL 3350 17 G/17G
17 POWDER, FOR SOLUTION ORAL DAILY PRN
Status: CANCELLED | OUTPATIENT
Start: 2025-02-06

## 2025-02-06 RX ORDER — ACETAMINOPHEN 325 MG/1
650 TABLET ORAL EVERY 4 HOURS PRN
Status: CANCELLED | OUTPATIENT
Start: 2025-02-06

## 2025-02-06 RX ORDER — MORPHINE SULFATE 4 MG/ML
4 INJECTION, SOLUTION INTRAMUSCULAR; INTRAVENOUS EVERY 2 HOUR PRN
Status: CANCELLED | OUTPATIENT
Start: 2025-02-06

## 2025-02-06 RX ORDER — HYDROCODONE BITARTRATE AND ACETAMINOPHEN 5; 325 MG/1; MG/1
1 TABLET ORAL EVERY 4 HOURS PRN
Status: CANCELLED | OUTPATIENT
Start: 2025-02-06

## 2025-02-06 RX ORDER — OXYBUTYNIN CHLORIDE 5 MG/1
5 TABLET ORAL ONCE
Status: COMPLETED | OUTPATIENT
Start: 2025-02-06 | End: 2025-02-06

## 2025-02-06 RX ORDER — SODIUM CHLORIDE 9 MG/ML
INJECTION, SOLUTION INTRAVENOUS CONTINUOUS
Status: CANCELLED | OUTPATIENT
Start: 2025-02-06

## 2025-02-06 RX ORDER — SODIUM PHOSPHATE, DIBASIC AND SODIUM PHOSPHATE, MONOBASIC 7; 19 G/230ML; G/230ML
1 ENEMA RECTAL ONCE AS NEEDED
Status: CANCELLED | OUTPATIENT
Start: 2025-02-06

## 2025-02-06 RX ORDER — METOPROLOL TARTRATE 75 MG/1
75 TABLET ORAL 2 TIMES DAILY
COMMUNITY

## 2025-02-06 RX ORDER — ACETAMINOPHEN 500 MG
500 TABLET ORAL EVERY 4 HOURS PRN
Status: CANCELLED | OUTPATIENT
Start: 2025-02-06

## 2025-02-06 NOTE — ED QUICK NOTES
Rounding Completed    Plan of Care reviewed. Waiting for results.  Elimination needs assessed.  Provided update and blanket.    Bed is locked and in lowest position. Call light within reach.Family at bedside.

## 2025-02-06 NOTE — DISCHARGE INSTRUCTIONS
Pyridium as prescribed  Follow-up with Dr. Barnes for procedure on Monday  Follow-up with urology, primary care and cardiology  Continue regular medications

## 2025-02-06 NOTE — H&P (VIEW-ONLY)
DIANA  HOSPITALIST  History and Physical/ ED CONSULT     Shaq Song Patient Status:  Emergency    10/2/1940 MRN [id number]   [facility] EMERGENCY DEPARTMENT Attending Sarah Roberto,    Hosp Day # 0 PCP Alis Delgado MD     Chief Complaint:   Abd pain    History of Present Illness: Shaq Song is a 84 year old male with PMH sig for depression, GERD/gastritis, DL, HTN, hx of PE, hx of syncope, BPH w/ urinary/kidney stones, urinary retention w/ bender in place presents to ED after having CT a/p done which showed pancreatic mass - pts PCP advised him to come to the ED. In the ED the pt is comfortable but c/o suprapubic pain, and the urge to urinate. He is concerned about the CT scan findings, and how to coordinate care. He states he has a bender cath in place but it is not draining urine properly and he has been seeing Dr Palencia for this issue. He states Dr Palencia wants to do surgery for the stones but bc he is on AC needs to see his cardiolgist first. He states he went to see his cardiologist last week and had and EKG yest. Does admit to some 10-20 lb wt loss. Wife is at bedside able to provide more details with his recent medical history.   ED doc contacted hospitalist service for admission >> advised her to get GI for possible biopsy and Urology since they ordered the CT scan. Labs reviewed - show plt 148, bmp normal.     Past Medical History:  Past Medical History:    Actinic keratoses    Colon polyp    DEPRESSION    Esophageal reflux    Gastritis    Gastro-esophageal reflux disease with esophagitis    Glucose intolerance    Hiatal hernia    High blood pressure    HYPERTRIGLYCERIDEMIA    LAE (left atrial enlargement)    LAE (left atrial enlargement)    Left ventricular diastolic dysfunction with preserved systolic function    LVH (left ventricular hypertrophy)    Mild ascending aorta dilation    Osteoarthrosis, unspecified whether generalized or localized, unspecified site    Other and  unspecified hyperlipidemia    Pancreas cyst (HCC)    bx    Pulmonary embolism (HCC)    RENAL DISEASE    RLS (restless legs syndrome)    Synovial cyst of lumbar facet joint    Unspecified essential hypertension    Vasovagal syncope    Visual impairment    glasses        Past Surgical History:   Past Surgical History:   Procedure Laterality Date    Colonoscopy  06/22/2010    POLYP/5YRS, Dr Garcia    Egd and esd - internal  12/11/2017    Esophageal bx benign, pancreatic cyst aspirartion.  Dr Holland    Tonsillectomy      Upper gi endoscopy - referral  6/22/10       Social History:  reports that he quit smoking about 67 years ago. His smoking use included cigarettes. He has never used smokeless tobacco. He reports current alcohol use. He reports that he does not use drugs.    Family History:   Family History   Problem Relation Age of Onset    Cancer Father         esophageal    Cancer Mother         breast        Allergies: Allergies[1]    Medications:  Medications Ordered Prior to Encounter[2]    Review of Systems:   A comprehensive 14 point review of systems was completed.    Pertinent positives and negatives noted in the HPI.    Physical Exam:    BP (!) 193/85   Pulse 70   Temp 96.5 °F (35.8 °C) (Temporal)   Resp 22   Ht 5' 7\" (1.702 m)   Wt 170 lb (77.1 kg)   SpO2 100%   BMI 26.63 kg/m²   General: No acute distress. Alert and oriented x 3.  HEENT: Normocephalic atraumatic. Moist mucous membranes. EOM-I. PERRLA. Anicteric.  Neck: No lymphadenopathy. No JVD. No carotid bruits.  Respiratory: Clear to auscultation bilaterally. No wheezes. No rhonchi.  Cardiovascular: S1, S2. Regular rate and rhythm. No murmurs, rubs or gallops. Equal pulses.   Chest and Back: No tenderness or deformity.  Abdomen: soft, suprapubic tenderness,  nondistended.  Positive bowel sounds. No rebound, guarding or organomegaly.  Neurologic: No focal neurological deficits. CNII-XII grossly intact.  Musculoskeletal: Moves all  extremities.  Extremities: No edema or cyanosis.  Integument: No rashes or lesions.   Psychiatric: Appropriate mood and affect.      Diagnostic Data:      Labs:  Recent Labs   Lab 02/06/25  1226   WBC 9.1   HGB 13.4   MCV 85.7   .0*       Recent Labs   Lab 02/06/25  1226   *   BUN 17   CREATSERUM 1.24   CA 9.2   ALB 4.5      K 3.8      CO2 25.0   ALKPHO 58   AST 17   ALT 8*   BILT 1.0   TP 7.2       Estimated Creatinine Clearance: 41.5 mL/min (based on SCr of 1.24 mg/dL).    No results for input(s): \"PTP\", \"INR\" in the last 168 hours.    COVID-19 Lab Results    COVID-19  Lab Results   Component Value Date    COVID19 Not Detected 07/04/2020       Pro-Calcitonin  No results for input(s): \"PCT\" in the last 168 hours.    Cardiac  No results for input(s): \"TROP\", \"PBNP\" in the last 168 hours.    Creatinine Kinase  No results for input(s): \"CK\" in the last 168 hours.    Inflammatory Markers  No results for input(s): \"CRP\", \"THAD\", \"LDH\", \"DDIMER\" in the last 168 hours.    No results for input(s): \"TROP\", \"TROPHS\", \"CK\" in the last 168 hours.    Imaging: Imaging data reviewed in Epic.      ASSESSMENT / PLAN:   Shaq Song is a 84 year old male with PMH sig for depression, GERD/gastritis, DL, HTN, hx of PE, hx of syncope, BPH w/ urinary/kidney stones, urinary retention w/ bender in place presents to ED after having CT a/p done which showed pancreatic mass - pts PCP advised him to come to the ED.    Pancreatic mass - susp for neoplasm  -GI consulted >> ED attending d/w Dr Wren who recommended outpt workup.   -Per GI -plan for output endoscopic US w/ biopsy on Monday  -supportive care in the meantime    BPH with urinary retention  Urinary/kidney stone  -keep bender in place  -bag changed in ED  -rec Urology consult >> ED attending dw Dr Alvarado >> no surgery for months most likey  -ok to proceed with endoscopic US and biopsy of pancreatic mass  -discharged home on Pyridium and give dose of Ditropan  in ED  -bender care per urology >> pt can fu with Dr Palencia as needed    GERD   Gastritis  -cont home meds    HTN  DL  -resume home meds    Hx of PE  -on eliquis  -seeing cardiology Dr Bruce as outpt >> defer when to hold AC per cardiology.      DISPO  Pt to be discharged from ED  GI Dr Wren consulted >> planning Endoscopi US and biopsy this comingMonday  Urology consulted >> Dr Alvarado >> pyridium, can fu with Dr Palencia as needed, no surgery planned  Fu with PCP         Chloe Chaidez Hospitalist  Pager [phone]  Answering Service number: [phone]                              [1]   Allergies  Allergen Reactions    Pcn [Penicillins]      \" FAINTED\"   [2]   No current facility-administered medications on file prior to encounter.     Current Outpatient Medications on File Prior to Encounter   Medication Sig Dispense Refill    Metoprolol Tartrate 75 MG Oral Tab Take 75 mg by mouth 2 (two) times daily.      apixaban 5 MG Oral Tab Take 1 tablet (5 mg total) by mouth 2 (two) times daily.      finasteride 5 MG Oral Tab Take 1 tablet (5 mg total) by mouth daily.      metFORMIN 500 MG Oral Tab Take 1 tablet (500 mg total) by mouth 2 (two) times daily with meals.      valsartan 320 MG Oral Tab Take 1 tablet (320 mg total) by mouth daily. 90 tablet 3    amitriptyline 10 MG Oral Tab Take 1 tablet (10 mg total) by mouth every evening. 90 tablet 3    aspirin 81 MG Oral Tab Take 1 tablet (81 mg total) by mouth daily.      Cholecalciferol (VITAMIN D3 OR) Take 1 tablet by mouth daily.      MULTIVITAMINS OR TABS Take 1 tablet by mouth daily.

## 2025-02-06 NOTE — H&P
DIANA  HOSPITALIST  History and Physical/ ED CONSULT     Shaq Song Patient Status:  Emergency    10/2/1940 MRN [id number]   [facility] EMERGENCY DEPARTMENT Attending Sarah Roberto,    Hosp Day # 0 PCP Alis Delgado MD     Chief Complaint:   Abd pain    History of Present Illness: Shaq Song is a 84 year old male with PMH sig for depression, GERD/gastritis, DL, HTN, hx of PE, hx of syncope, BPH w/ urinary/kidney stones, urinary retention w/ bender in place presents to ED after having CT a/p done which showed pancreatic mass - pts PCP advised him to come to the ED. In the ED the pt is comfortable but c/o suprapubic pain, and the urge to urinate. He is concerned about the CT scan findings, and how to coordinate care. He states he has a bender cath in place but it is not draining urine properly and he has been seeing Dr Palencia for this issue. He states Dr Palencia wants to do surgery for the stones but bc he is on AC needs to see his cardiolgist first. He states he went to see his cardiologist last week and had and EKG yest. Does admit to some 10-20 lb wt loss. Wife is at bedside able to provide more details with his recent medical history.   ED doc contacted hospitalist service for admission >> advised her to get GI for possible biopsy and Urology since they ordered the CT scan. Labs reviewed - show plt 148, bmp normal.     Past Medical History:  Past Medical History:    Actinic keratoses    Colon polyp    DEPRESSION    Esophageal reflux    Gastritis    Gastro-esophageal reflux disease with esophagitis    Glucose intolerance    Hiatal hernia    High blood pressure    HYPERTRIGLYCERIDEMIA    LAE (left atrial enlargement)    LAE (left atrial enlargement)    Left ventricular diastolic dysfunction with preserved systolic function    LVH (left ventricular hypertrophy)    Mild ascending aorta dilation    Osteoarthrosis, unspecified whether generalized or localized, unspecified site    Other and  unspecified hyperlipidemia    Pancreas cyst (HCC)    bx    Pulmonary embolism (HCC)    RENAL DISEASE    RLS (restless legs syndrome)    Synovial cyst of lumbar facet joint    Unspecified essential hypertension    Vasovagal syncope    Visual impairment    glasses        Past Surgical History:   Past Surgical History:   Procedure Laterality Date    Colonoscopy  06/22/2010    POLYP/5YRS, Dr Garcia    Egd and esd - internal  12/11/2017    Esophageal bx benign, pancreatic cyst aspirartion.  Dr Holland    Tonsillectomy      Upper gi endoscopy - referral  6/22/10       Social History:  reports that he quit smoking about 67 years ago. His smoking use included cigarettes. He has never used smokeless tobacco. He reports current alcohol use. He reports that he does not use drugs.    Family History:   Family History   Problem Relation Age of Onset    Cancer Father         esophageal    Cancer Mother         breast        Allergies: Allergies[1]    Medications:  Medications Ordered Prior to Encounter[2]    Review of Systems:   A comprehensive 14 point review of systems was completed.    Pertinent positives and negatives noted in the HPI.    Physical Exam:    BP (!) 193/85   Pulse 70   Temp 96.5 °F (35.8 °C) (Temporal)   Resp 22   Ht 5' 7\" (1.702 m)   Wt 170 lb (77.1 kg)   SpO2 100%   BMI 26.63 kg/m²   General: No acute distress. Alert and oriented x 3.  HEENT: Normocephalic atraumatic. Moist mucous membranes. EOM-I. PERRLA. Anicteric.  Neck: No lymphadenopathy. No JVD. No carotid bruits.  Respiratory: Clear to auscultation bilaterally. No wheezes. No rhonchi.  Cardiovascular: S1, S2. Regular rate and rhythm. No murmurs, rubs or gallops. Equal pulses.   Chest and Back: No tenderness or deformity.  Abdomen: soft, suprapubic tenderness,  nondistended.  Positive bowel sounds. No rebound, guarding or organomegaly.  Neurologic: No focal neurological deficits. CNII-XII grossly intact.  Musculoskeletal: Moves all  extremities.  Extremities: No edema or cyanosis.  Integument: No rashes or lesions.   Psychiatric: Appropriate mood and affect.      Diagnostic Data:      Labs:  Recent Labs   Lab 02/06/25  1226   WBC 9.1   HGB 13.4   MCV 85.7   .0*       Recent Labs   Lab 02/06/25  1226   *   BUN 17   CREATSERUM 1.24   CA 9.2   ALB 4.5      K 3.8      CO2 25.0   ALKPHO 58   AST 17   ALT 8*   BILT 1.0   TP 7.2       Estimated Creatinine Clearance: 41.5 mL/min (based on SCr of 1.24 mg/dL).    No results for input(s): \"PTP\", \"INR\" in the last 168 hours.    COVID-19 Lab Results    COVID-19  Lab Results   Component Value Date    COVID19 Not Detected 07/04/2020       Pro-Calcitonin  No results for input(s): \"PCT\" in the last 168 hours.    Cardiac  No results for input(s): \"TROP\", \"PBNP\" in the last 168 hours.    Creatinine Kinase  No results for input(s): \"CK\" in the last 168 hours.    Inflammatory Markers  No results for input(s): \"CRP\", \"THAD\", \"LDH\", \"DDIMER\" in the last 168 hours.    No results for input(s): \"TROP\", \"TROPHS\", \"CK\" in the last 168 hours.    Imaging: Imaging data reviewed in Epic.      ASSESSMENT / PLAN:   Shaq Song is a 84 year old male with PMH sig for depression, GERD/gastritis, DL, HTN, hx of PE, hx of syncope, BPH w/ urinary/kidney stones, urinary retention w/ bender in place presents to ED after having CT a/p done which showed pancreatic mass - pts PCP advised him to come to the ED.    Pancreatic mass - susp for neoplasm  -GI consulted >> ED attending d/w Dr Wren who recommended outpt workup.   -Per GI -plan for output endoscopic US w/ biopsy on Monday  -supportive care in the meantime    BPH with urinary retention  Urinary/kidney stone  -keep bender in place  -bag changed in ED  -rec Urology consult >> ED attending dw Dr Alvarado >> no surgery for months most likey  -ok to proceed with endoscopic US and biopsy of pancreatic mass  -discharged home on Pyridium and give dose of Ditropan  in ED  -bender care per urology >> pt can fu with Dr Palencia as needed    GERD   Gastritis  -cont home meds    HTN  DL  -resume home meds    Hx of PE  -on eliquis  -seeing cardiology Dr Bruce as outpt >> defer when to hold AC per cardiology.      DISPO  Pt to be discharged from ED  GI Dr Wren consulted >> planning Endoscopi US and biopsy this comingMonday  Urology consulted >> Dr Alvarado >> pyridium, can fu with Dr Palencia as needed, no surgery planned  Fu with PCP         Chloe Chaidez Hospitalist  Pager [phone]  Answering Service number: [phone]                              [1]   Allergies  Allergen Reactions    Pcn [Penicillins]      \" FAINTED\"   [2]   No current facility-administered medications on file prior to encounter.     Current Outpatient Medications on File Prior to Encounter   Medication Sig Dispense Refill    Metoprolol Tartrate 75 MG Oral Tab Take 75 mg by mouth 2 (two) times daily.      apixaban 5 MG Oral Tab Take 1 tablet (5 mg total) by mouth 2 (two) times daily.      finasteride 5 MG Oral Tab Take 1 tablet (5 mg total) by mouth daily.      metFORMIN 500 MG Oral Tab Take 1 tablet (500 mg total) by mouth 2 (two) times daily with meals.      valsartan 320 MG Oral Tab Take 1 tablet (320 mg total) by mouth daily. 90 tablet 3    amitriptyline 10 MG Oral Tab Take 1 tablet (10 mg total) by mouth every evening. 90 tablet 3    aspirin 81 MG Oral Tab Take 1 tablet (81 mg total) by mouth daily.      Cholecalciferol (VITAMIN D3 OR) Take 1 tablet by mouth daily.      MULTIVITAMINS OR TABS Take 1 tablet by mouth daily.

## 2025-02-06 NOTE — ED PROVIDER NOTES
Patient Seen in: [facility] Emergency Department      History     Chief Complaint   Patient presents with    Abnormal Result    Abdomen/Flank Pain     Stated Complaint: abnormal CT,mass to pancreas    Subjective:   HPI    This is an 84-year-old male past medical history of bladder stones, A-fib on Eliquis, BPH, hypertension, hiatal hernia, depression, GERD, PE not anticoagulated who presents from cardiology office after being evaluated today for preop screening for bladder surgery.  Apparently they noted he had a pancreatic mass on his CAT scan and they told him he should come to the emergency room.  Patient was trying to get cardiac clearance to undergo urologic surgery   by dr maya.  Patient's had a 30 to 40 pound weight loss over the last couple of months.  He has had malaise he just has not been feeling well.  It has been complicated by bladder stones.  He had indwelling Morocho catheter placed in the emergency room on January 19.  He denies any fevers or chills.  No nausea or vomiting.  No fevers.  He presents here from home with his wife at bedside for further evaluation.    Objective:     Past Medical History:    Actinic keratoses    Colon polyp    DEPRESSION    Esophageal reflux    Gastritis    Gastro-esophageal reflux disease with esophagitis    Glucose intolerance    Hiatal hernia    High blood pressure    HYPERTRIGLYCERIDEMIA    LAE (left atrial enlargement)    LAE (left atrial enlargement)    Left ventricular diastolic dysfunction with preserved systolic function    LVH (left ventricular hypertrophy)    Mild ascending aorta dilation    Osteoarthrosis, unspecified whether generalized or localized, unspecified site    Other and unspecified hyperlipidemia    Pancreas cyst (HCC)    bx    Pulmonary embolism (HCC)    RENAL DISEASE    RLS (restless legs syndrome)    Synovial cyst of lumbar facet joint    Unspecified essential hypertension    Vasovagal syncope    Visual impairment    glasses               Past Surgical History:   Procedure Laterality Date    Colonoscopy  2010    POLYP/5YRS, Dr Garcia    Egd and esd - internal  2017    Esophageal bx benign, pancreatic cyst aspirartion.  Dr Holland    Tonsillectomy      Upper gi endoscopy - referral  6/22/10                Social History     Socioeconomic History    Marital status:    Tobacco Use    Smoking status: Former     Current packs/day: 0.00     Types: Cigarettes     Quit date: 1958     Years since quittin.1    Smokeless tobacco: Never   Substance and Sexual Activity    Alcohol use: Yes     Comment: 2-3/MONTH    Drug use: No    Sexual activity: Yes     Comment:                   Physical Exam     ED Triage Vitals [25 1114]   BP (!) 199/86   Pulse 63   Resp 16   Temp 96.5 °F (35.8 °C)   Temp src Temporal   SpO2 96 %   O2 Device None (Room air)       Current Vitals:   Vital Signs  BP: (!) 178/73  Pulse: 70  Resp: 17  Temp: 96.5 °F (35.8 °C)  Temp src: Temporal  MAP (mmHg): (!) 104    Oxygen Therapy  SpO2: 100 %  O2 Device: None (Room air)        Physical Exam  GENERAL: Awake, alert oriented x3, ill-appearing thin..  Pale,  SKIN: Normal, warm, and dry.  HEENT:  Pupils equally round and reactive to light. Conjuctiva clear.  Oropharynx is clear and moist.   Lungs: Clear to auscultation bilaterally with no rales, no retractions, and no wheezing.  HEART:  Regular rate and rhythm. S1 and S2. No murmurs, no rubs or gallops.   ABDOMEN: Soft, nontender and nondistended. Normoactive bowel sounds. No rebound. No guarding.   EXTREMITIES: Warm with brisk capillary refill.         ED Course     Labs Reviewed   COMP METABOLIC PANEL (14) - Abnormal; Notable for the following components:       Result Value    Glucose 183 (*)     eGFR-Cr 57 (*)     ALT 8 (*)     All other components within normal limits   CBC WITH DIFFERENTIAL WITH PLATELET - Abnormal; Notable for the following components:    HCT 38.3 (*)     .0 (*)     All other  components within normal limits   URINALYSIS, ROUTINE - Abnormal; Notable for the following components:    Clarity Urine Ex.Turbid (*)     Glucose Urine 50 (*)     Ketones Urine 10 (*)     Blood Urine 3+ (*)     Protein Urine 100 (*)     Nitrite Urine 1+ (*)     Leukocyte Esterase Urine 25 (*)     RBC Urine >10 (*)     All other components within normal limits   LIPASE - Normal   RAINBOW DRAW BLUE                   MDM        This is an 84-year-old male past medical history of bladder stones, A-fib on Eliquis, BPH, hypertension, hiatal hernia, depression, GERD, PE not anticoagulated who presents from cardiology office after being evaluated today for preop screening for bladder surgery.  Apparently they noted he had a pancreatic mass on his CAT scan and they told him he should come to the emergency room.  Patient was trying to get cardiac clearance to undergo urologic surgery   by dr maya.  Patient's had a 30 to 40 pound weight loss over the last couple of months.  He has had malaise he just has not been feeling well.  It has been complicated by bladder stones.  He had indwelling Morocho catheter placed in the emergency room on January 19.  Differential includes pancreatic malignancy, pancreatic cyst.    Patient placed on cardiac monitor, continuous pulse oximetry and IV line was established of normal saline.  Basic labs were obtained.  CBC: White blood cell count 9.1.  Hemoglobin 13.4.  Platelet 148.  CMP: BUN 17.  Creatinine 1.2.  Glucose 193.  Bicarb 25.  Liver functions were normal.    Patient was given some IV hydralazine due to elevated blood pressure.    Urinalysis via Morocho catheter.  Nitrite 1+.  Leuk esterase 25.  No bacteria.  Urine culture pending.        Patient will be unit for further workup of pancreatic mass.  Most likely will need biopsy but is currently on Eliquis.  He did see cardiology in the office this morning.  No note available for review.      Case discussed with duly hospitalist Dr. Iniguez in  case patient does get admitted.        Case discussed with  who feels this can be an outpatient workup.  He is arranged for an outpatient endoscopic ultrasound with biopsy this upcoming Monday.    Dr. Alvarado was consulted.  He states the patient most likely will not of surgery for months.  To proceed with endoscopic ultrasound and biopsy.  He will be discharged home on Pyridium was given a dose of Ditropan in the emergency room.      Patient discharged home in good condition.    Reviewed urology office note.  Dr. Palencia was seen on 1/29/2025 and recommended TURP and laser lithotripsy of bladder stones.      Reviewed CT from 2/3/2025    Impression    IMPRESSION:  1. New soft tissue mass involving the pancreatic tail worrisome for neoplasm. Correlation with MRI  is recommended.  2. Staghorn type calyceal calculus in the left lower pole measuring up to 2.0 cm without  hydronephrosis.  3. Multiple bladder stones with Morocho catheter in place  4. Severe prostatomegaly.  5. Trace pelvic free fluid abnormal in a male patient.  Narrative    DATE OF SERVICE: 02.03.2025  CT ABDOMEN+PELVIS(CPT=74176)    Disposition and Plan     Clinical Impression:  1. Pancreatic mass (HCC)    2. Unintentional weight loss    3. Bladder stones         Disposition:  Discharge  2/6/2025  3:21 pm    Follow-up:  Alis Delgado MD  [address]   [facility] [address] [phone]    Follow up in 1 week(s)      John Wren MD  [address]   [facility] [address]  [facility] [address] [phone]    Follow up      Jose Palencia MD  [address]  [address]  [facility] [id number] [phone]    Follow up      [facility]  [address] [address] [phone]  Follow up            Medications Prescribed:  Current Discharge Medication List        START taking these medications    Details   phenazopyridine 100 MG Oral Tab Take 1 tablet (100 mg total) by mouth 3 (three) times daily as needed for Pain.  Qty: 15 tablet, Refills:  0                 Supplementary Documentation:

## 2025-02-06 NOTE — ED INITIAL ASSESSMENT (HPI)
PT states that he was directed to come to the ED due to abnormal CT results (pancreatic mass) detected during follow up appointment for bender catheter ( placed in this ED) due to urinary symptoms. PT states that he has been also experiencing persistent urethra pain, blood in his urine, low appetite, lost a significant amount of weight.

## 2025-02-07 NOTE — PAT NURSING NOTE
Multiple messages left for surgeon's schedulers Mallorie and Niharika without response. Per spouse procedure should be canceled. At this time patient remains on the schedule. Spoke with Ankit nurse at surgeon's office and informed.     Caroline in endo informed.    UPDATE 2/07/2025 @ 1648 - Per Ankit at surgeon's,  spoke with patient and it has been decided he will DOS will proceed as scheduled on 2/10/25.

## 2025-02-07 NOTE — CONSULTS
[facility]   part of [facility]    Report of Gastroenterology Consultation    Shaq Song Patient Status:  Emergency    10/2/1940 MRN [id number]   Location [facility] EMERGENCY DEPARTMENT Attending No att. providers found   Hosp Day # 0 PCP Alis Delgado MD     Date of Admission:  2025  Date of Consult:   2025    Reason for Consultation:    Pancreas mass    History of Present Illness:  Shaq Song is a a(n) 84 year old male.     Patient has had urinary retention since .  Bladder stones and prostate enlargement found.  Had indwelling catheter.  Blockage of catheter complicated matter    Here in ED for work up of pancreatic mass noted on recent CT through [facility]    On Eliquis for Afib.    Last ELiquis yesterday    Denies weight loss, back pain    No prior pancreatitis.  Non alcoholic    History:  Past Medical History:    Actinic keratoses    Colon polyp    DEPRESSION    Esophageal reflux    Gastritis    Gastro-esophageal reflux disease with esophagitis    Glucose intolerance    Hiatal hernia    High blood pressure    HYPERTRIGLYCERIDEMIA    LAE (left atrial enlargement)    LAE (left atrial enlargement)    Left ventricular diastolic dysfunction with preserved systolic function    LVH (left ventricular hypertrophy)    Mild ascending aorta dilation    Osteoarthrosis, unspecified whether generalized or localized, unspecified site    Other and unspecified hyperlipidemia    Pancreas cyst (HCC)    bx    Pulmonary embolism (HCC)    RENAL DISEASE    RLS (restless legs syndrome)    Synovial cyst of lumbar facet joint    Unspecified essential hypertension    Vasovagal syncope    Visual impairment    glasses     Past Surgical History:   Procedure Laterality Date    Colonoscopy  2010    POLYP/5YRS, Dr Garcia    Egd and esd - internal  2017    Esophageal bx benign, pancreatic cyst aspirartion.  Dr Holland    Tonsillectomy      Upper gi endoscopy - referral  6/22/10     Family History    Problem Relation Age of Onset    Cancer Father         esophageal    Cancer Mother         breast      reports that he quit smoking about 67 years ago. His smoking use included cigarettes. He has never used smokeless tobacco. He reports current alcohol use. He reports that he does not use drugs.    Allergies:  Allergies[1]    Medications:  No current facility-administered medications for this encounter.    Review of Systems:  Gastrointestinal: Denies positive test for blood stool, heartburn/indigestion/reflux, belching, difficulty swallowing, irregular bowel habits, painful swallowing, diarrhea, abdominal pain, constipation, nausea, incontinence of stool, vomiting, black stools, get full quickly at meals, blood in stools, abdominal distention, jaundice, flatulence, vomiting blood, bloating, hernia, laxative use, food/milk intolerance, pain with bowel movement, hemorrhoids.  General: Denies fatigue, chills/fever, night sweats, weight loss, loss of appetite, weight gain, sleep disturbance.  Neurological: Denies frequent headaches, history of stroke, recent passing out, recent dizziness, convulsions/seizures, dementia.  Cardiovascular: Denies history of heart murmur, leg swelling, history of rheumatic fever, pacemaker, chest pain or pressure after eating or when upset, automatic defibrillator, angina, other implanted devices, irregular heart rate/palpitations, high cholesterol or triglycerides, coronary stents.  Respiratory: Denies shortness of breath, chronic/frequent hoarseness, wheezing, exposure to tuberculosis, chronic cough, spitting up blood, cough up sputum, sleep apnea.  Genitourinary: Denies kidney stones, painful/difficult urination, frequent urinary infections, frequent urination, blood in urine, incontinence, kidney failure, prostate problems.  Endocrine: Denies thyroid disease, denies diabetes.  Female complaints: Denies endometriosis, painful menstrual periods, heavy menstrual periods.  Patient is not  pregnant.  Psychosocial: Denies history of mental illness, denies usually feeling lonely or depressed, denies history of depression, anxiety, history of physical or sexual abuse, stress, history of eating disorder.  Skin: Denies severe itching, unusual moles, rash, flushing, change in hair or nails.  Bone/joint: Denies arthritis, back pain, joint pain, osteoporosis.  Heme/Lymphatic: Denies easy bruising, anemia, excessive bleeding, enlarging or painful lymph nodes.  Allergy: Denies medication allergy, latex/rubber allergy, anaphylactic or other reaction to anesthesia, food allergy.   Eyes: Denies blurred/double vision, eye disease, glasses or contacts, glaucoma.  ENT: Denies nose or gums bleeding, mouth sores, bad breath or bad taste in mouth, hearing loss.  Physical Exam:    Blood pressure (!) 178/73, pulse 70, temperature 96.5 °F (35.8 °C), temperature source Temporal, resp. rate 17, height 5' 7\" (1.702 m), weight 170 lb (77.1 kg), SpO2 100%.    General: Appears alert, oriented x3 and in no acute distress.  HEENT: Normal. No neck vein distention. Thyroid not enlarged.  No lymphadenopathy.  CV: S1 and S2 normal.  No murmurs or gallops.  Lungs: Clear to auscultation.  Abdomen: Soft and nondistended.  Nontender.  No masses.  Bowel sounds are present.  Back: No CVA tenderness.  Extremities: No edema, cyanosis, or clubbing.  Skin: Warm    Laboratory Data:  Lab Results   Component Value Date    WBC 9.1 02/06/2025    HGB 13.4 02/06/2025    HCT 38.3 02/06/2025    .0 02/06/2025    CREATSERUM 1.24 02/06/2025    BUN 17 02/06/2025     02/06/2025    K 3.8 02/06/2025     02/06/2025    CO2 25.0 02/06/2025     02/06/2025    CA 9.2 02/06/2025    ALB 4.5 02/06/2025    ALKPHO 58 02/06/2025    BILT 1.0 02/06/2025    TP 7.2 02/06/2025    AST 17 02/06/2025    ALT 8 02/06/2025    LIP 36 02/06/2025       Imaging:    CT    Impression    IMPRESSION:  1. New soft tissue mass involving the pancreatic tail worrisome  for neoplasm. Correlation with MRI  is recommended.  2. Staghorn type calyceal calculus in the left lower pole measuring up to 2.0 cm without  hydronephrosis.  3. Multiple bladder stones with Morocho catheter in place  4. Severe prostatomegaly.  5. Trace pelvic free fluid abnormal in a male patient.      Assessment/Plan:    Incidental pancreas mass in tail.   Ddx adenocarcinoma, NET, cystic     Urinary retention.      Discussed case with Dr. Prasanna Cunningham (covering for Dr Palencia) - bladder surgery not likley to be scheduled for a while    Will proceed with EUS as outpatient- tentatively scheduled for Monday pending approval from cardiology to stop [email or web address]          Patient Active Problem List   Diagnosis    Glucose intolerance (impaired glucose tolerance)    Pure hyperglyceridemia    Pure hypercholesterolemia    Esophageal reflux    Insomnia    Heart murmur    Thoracic ascending aortic aneurysm    LAE (left atrial enlargement)    Left ventricular diastolic dysfunction with preserved systolic function    Benign prostatic hyperplasia with lower urinary tract symptoms    Essential hypertension with goal blood pressure less than 130/80    Aortic valve sclerosis    History of pulmonary embolism    Non-rheumatic mitral regurgitation    Pancreatic cyst (HCC)    Mitral valve prolapse    Hypokalemia    Localized osteoarthritis of right shoulder    Pancreatic mass (HCC)            John Wren MD  2/6/2025  10:43 PM         [1]   Allergies  Allergen Reactions    Pcn [Penicillins]      \" FAINTED\"

## 2025-02-10 ENCOUNTER — ANESTHESIA EVENT (OUTPATIENT)
Dept: ENDOSCOPY | Facility: HOSPITAL | Age: 85
End: 2025-02-10
Payer: MEDICARE

## 2025-02-10 ENCOUNTER — HOSPITAL ENCOUNTER (OUTPATIENT)
Facility: HOSPITAL | Age: 85
Setting detail: HOSPITAL OUTPATIENT SURGERY
Discharge: HOME | End: 2025-02-10
Attending: INTERNAL MEDICINE | Admitting: INTERNAL MEDICINE
Payer: MEDICARE

## 2025-02-10 ENCOUNTER — ANESTHESIA (OUTPATIENT)
Dept: ENDOSCOPY | Facility: HOSPITAL | Age: 85
End: 2025-02-10
Payer: MEDICARE

## 2025-02-10 VITALS
SYSTOLIC BLOOD PRESSURE: 163 MMHG | RESPIRATION RATE: 18 BRPM | HEART RATE: 75 BPM | HEIGHT: 67 IN | BODY MASS INDEX: 26.21 KG/M2 | TEMPERATURE: 98 F | WEIGHT: 167 LBS | OXYGEN SATURATION: 98 % | DIASTOLIC BLOOD PRESSURE: 78 MMHG

## 2025-02-10 LAB — GLUCOSE BLD-MCNC: 220 MG/DL (ref 70–99)

## 2025-02-10 PROCEDURE — 82962 GLUCOSE BLOOD TEST: CPT

## 2025-02-10 PROCEDURE — 0FDG8ZX EXTRACTION OF PANCREAS, VIA NATURAL OR ARTIFICIAL OPENING ENDOSCOPIC, DIAGNOSTIC: ICD-10-PCS | Performed by: INTERNAL MEDICINE

## 2025-02-10 PROCEDURE — 88342 IMHCHEM/IMCYTCHM 1ST ANTB: CPT | Performed by: INTERNAL MEDICINE

## 2025-02-10 PROCEDURE — 88341 IMHCHEM/IMCYTCHM EA ADD ANTB: CPT | Performed by: INTERNAL MEDICINE

## 2025-02-10 PROCEDURE — 88307 TISSUE EXAM BY PATHOLOGIST: CPT | Performed by: INTERNAL MEDICINE

## 2025-02-10 PROCEDURE — 88173 CYTOPATH EVAL FNA REPORT: CPT | Performed by: INTERNAL MEDICINE

## 2025-02-10 PROCEDURE — 88172 CYTP DX EVAL FNA 1ST EA SITE: CPT | Performed by: INTERNAL MEDICINE

## 2025-02-10 RX ORDER — SODIUM CHLORIDE, SODIUM LACTATE, POTASSIUM CHLORIDE, CALCIUM CHLORIDE 600; 310; 30; 20 MG/100ML; MG/100ML; MG/100ML; MG/100ML
INJECTION, SOLUTION INTRAVENOUS CONTINUOUS
Status: DISCONTINUED | OUTPATIENT
Start: 2025-02-10 | End: 2025-02-10

## 2025-02-10 RX ORDER — LIDOCAINE HYDROCHLORIDE 10 MG/ML
INJECTION, SOLUTION EPIDURAL; INFILTRATION; INTRACAUDAL; PERINEURAL AS NEEDED
Status: DISCONTINUED | OUTPATIENT
Start: 2025-02-10 | End: 2025-02-10 | Stop reason: SURG

## 2025-02-10 RX ORDER — NALOXONE HYDROCHLORIDE 0.4 MG/ML
0.08 INJECTION, SOLUTION INTRAMUSCULAR; INTRAVENOUS; SUBCUTANEOUS ONCE AS NEEDED
Status: DISCONTINUED | OUTPATIENT
Start: 2025-02-10 | End: 2025-02-10

## 2025-02-10 RX ORDER — HYDROMORPHONE HYDROCHLORIDE 1 MG/ML
0.5 INJECTION, SOLUTION INTRAMUSCULAR; INTRAVENOUS; SUBCUTANEOUS ONCE
Status: COMPLETED | OUTPATIENT
Start: 2025-02-10 | End: 2025-02-10

## 2025-02-10 RX ORDER — DEXTROSE MONOHYDRATE 25 G/50ML
50 INJECTION, SOLUTION INTRAVENOUS
Status: DISCONTINUED | OUTPATIENT
Start: 2025-02-10 | End: 2025-02-10

## 2025-02-10 RX ORDER — LEVOFLOXACIN 5 MG/ML
INJECTION, SOLUTION INTRAVENOUS
Status: DISCONTINUED
Start: 2025-02-10 | End: 2025-02-10

## 2025-02-10 RX ORDER — LEVOFLOXACIN 5 MG/ML
INJECTION, SOLUTION INTRAVENOUS AS NEEDED
Status: DISCONTINUED | OUTPATIENT
Start: 2025-02-10 | End: 2025-02-10 | Stop reason: SURG

## 2025-02-10 RX ORDER — HYDROMORPHONE HYDROCHLORIDE 1 MG/ML
INJECTION, SOLUTION INTRAMUSCULAR; INTRAVENOUS; SUBCUTANEOUS
Status: COMPLETED
Start: 2025-02-10 | End: 2025-02-10

## 2025-02-10 RX ORDER — NICOTINE POLACRILEX 4 MG
30 LOZENGE BUCCAL
Status: DISCONTINUED | OUTPATIENT
Start: 2025-02-10 | End: 2025-02-10

## 2025-02-10 RX ORDER — NICOTINE POLACRILEX 4 MG
15 LOZENGE BUCCAL
Status: DISCONTINUED | OUTPATIENT
Start: 2025-02-10 | End: 2025-02-10

## 2025-02-10 RX ADMIN — LIDOCAINE HYDROCHLORIDE 100 MG: 10 INJECTION, SOLUTION EPIDURAL; INFILTRATION; INTRACAUDAL; PERINEURAL at 07:38:00

## 2025-02-10 RX ADMIN — LEVOFLOXACIN 500 MG: 5 INJECTION, SOLUTION INTRAVENOUS at 07:51:00

## 2025-02-10 NOTE — ANESTHESIA POSTPROCEDURE EVALUATION
[facility]    Shaq Song Patient Status:  Hospital Outpatient Surgery   Age/Gender 84 year old male MRN [id number]   Location [facility] ENDOSCOPY PAIN CENTER Attending John Wren MD   Hosp Day # 0 PCP Alis Delgado MD       Anesthesia Post-op Note    UPPER ENDOSCOPIC ULTRASOUND (EUS) WITH FINE NEEDLE BIOPSIES    Procedure Summary       Date: 02/10/25 Room / Location:  ENDOSCOPY 03 /  ENDOSCOPY    Anesthesia Start: 0734 Anesthesia Stop: 0816    Procedure: UPPER ENDOSCOPIC ULTRASOUND (EUS) WITH FINE NEEDLE BIOPSIES Diagnosis: (pancreatic tail mass & cyst)    Surgeons: John Wren MD Anesthesiologist: Mark Anthony Montes MD    Anesthesia Type: MAC ASA Status: 2            Anesthesia Type: MAC    Vitals Value Taken Time   /77 02/10/25 0816   Temp  02/10/25 0820   Pulse 78 02/10/25 0819   Resp 16 02/10/25 0810   SpO2 97 % 02/10/25 0819   Vitals shown include unfiled device data.        Patient Location: Endoscopy    Anesthesia Type: MAC    Airway Patency: patent    Postop Pain Control: adequate    Mental Status: preanesthetic baseline    Nausea/Vomiting: none    Cardiopulmonary/Hydration status: stable euvolemic    Complications: no apparent anesthesia related complications    Postop vital signs: stable    Dental Exam: Unchanged from Preop

## 2025-02-10 NOTE — H&P
History & Physical Examination    Patient Name: Shaq Song  MRN: [id number]  CSN: [id number]  YOB: 1940    Diagnosis: MASS OF HEAD OF PANCREAS      Present Illness:  Shaq Song is a 84 year old male is here MASS OF HEAD OF PANCREAS.    Body mass index is 26.16 kg/m².    Past Medical History:    Actinic keratoses    Arrhythmia    AFib    Bladder stones    Colon polyp    DEPRESSION    Depression    Diabetes (HCC)    Esophageal reflux    Gastritis    Gastro-esophageal reflux disease with esophagitis    Glucose intolerance    Hearing impaired person, bilateral    Hiatal hernia    High blood pressure    HYPERTRIGLYCERIDEMIA    Incontinence    Has a urinary cathether, can be incontinent of stool with urinary urgency    LAE (left atrial enlargement)    LAE (left atrial enlargement)    Left ventricular diastolic dysfunction with preserved systolic function    LVH (left ventricular hypertrophy)    Mild ascending aorta dilation    Osteoarthrosis, unspecified whether generalized or localized, unspecified site    Other and unspecified hyperlipidemia    Pancreas cyst (HCC)    bx    Pulmonary embolism (HCC)    Many years ago    RENAL DISEASE    RLS (restless legs syndrome)    Synovial cyst of lumbar facet joint    Unspecified essential hypertension    Vasovagal syncope    Visual impairment    glasses       Procedure: EUS    Physician Pre-Sedation Assessment    Pre-Sedation Assessment:    Sedation History: Airway Assessed    ASA Classification: 2. Patient with mild systemic disease    Cardiac:    Respiratory:    Abdomen:      Plan: MAC        Current Facility-Administered Medications   Medication Dose Route Frequency    glucose (Dex4) 15 GM/59ML oral liquid 15 g  15 g Oral Q15 Min PRN    Or    glucose (Glutose) 40% oral gel 15 g  15 g Oral Q15 Min PRN    Or    glucose-vitamin C (Dex-4) chewable tab 4 tablet  4 tablet Oral Q15 Min PRN    Or    dextrose 50% injection 50 mL  50 mL Intravenous Q15 Min PRN     Or    glucose (Dex4) 15 GM/59ML oral liquid 30 g  30 g Oral Q15 Min PRN    Or    glucose (Glutose) 40% oral gel 30 g  30 g Oral Q15 Min PRN    Or    glucose-vitamin C (Dex-4) chewable tab 8 tablet  8 tablet Oral Q15 Min PRN    lactated ringers infusion   Intravenous Continuous       Allergies: Allergies[1]    Past Surgical History:   Procedure Laterality Date    Colonoscopy  2010    POLYP/5YRS, Dr Garcia    Egd and esd - internal  2017    Esophageal bx benign, pancreatic cyst aspirartion.  Dr Holland    Tonsillectomy      Upper gi endoscopy - referral  6/22/10     Family History   Problem Relation Age of Onset    Cancer Father         esophageal    Cancer Mother         breast     Social History     Tobacco Use    Smoking status: Former     Current packs/day: 0.00     Types: Cigarettes     Quit date: 1958     Years since quittin.1    Smokeless tobacco: Never   Substance Use Topics    Alcohol use: Not Currently     Comment: 2-3/MONTH       SYSTEM Check if Review is Normal Check if Physical Exam is Normal If not normal, please explain:   HEENT [x ] [x ]    NECK & BACK [x ] [x ]    HEART [x ] [x ]    LUNGS [x ] [x ]    ABDOMEN [x ] [x ]    UROGENITAL [ ] [ ]    EXTREMITIES [ ] [ ]    OTHER        [ x ] I have discussed the risks and benefits and alternatives with the patient/family.  They understand and agree to proceed with plan of care.  [ x ] I have reviewed the History and Physical done within the last 30 days.  Any changes noted above.    John Wren MD  2/10/2025  7:29 AM             [1]   Allergies  Allergen Reactions    Pcn [Penicillins]      \" FAINTED\"

## 2025-02-10 NOTE — ANESTHESIA PREPROCEDURE EVALUATION
PRE-OP EVALUATION    Patient Name: Shaq Song    Pre-op Diagnosis: Dilation of pancreatic duct [K86.89]    Procedure(s):  ENDOSCOPIC ULTRASOUND WITH FINE NEEDLE ASPIRATION  AND ANTIBIOTICS     Surgeon(s) and Role:     * Mansoor Deutsch MD - Primary    Pre-op vitals reviewed.  Temp: 97.8 °F (36.6 °C)  Pulse: 80  Resp: 18  BP: 166/85  SpO2: 97 %  Body mass index is 26.16 kg/m².    Current medications reviewed.  Hospital Medications:   glucose (Dex4) 15 GM/59ML oral liquid 15 g  15 g Oral Q15 Min PRN    Or    glucose (Glutose) 40% oral gel 15 g  15 g Oral Q15 Min PRN    Or    glucose-vitamin C (Dex-4) chewable tab 4 tablet  4 tablet Oral Q15 Min PRN    Or    dextrose 50% injection 50 mL  50 mL Intravenous Q15 Min PRN    Or    glucose (Dex4) 15 GM/59ML oral liquid 30 g  30 g Oral Q15 Min PRN    Or    glucose (Glutose) 40% oral gel 30 g  30 g Oral Q15 Min PRN    Or    glucose-vitamin C (Dex-4) chewable tab 8 tablet  8 tablet Oral Q15 Min PRN    lactated ringers infusion   Intravenous Continuous    levoFLOXacin in dextrose 5% (Levaquin) 500 mg/100mL IVPB premix        [COMPLETED] hydrALAzine (Apresoline) 20 mg/mL injection 5 mg  5 mg Intravenous Once    [COMPLETED] oxybutynin (Ditropan) tab 5 mg  5 mg Oral Once       Outpatient Medications:    aspirin 81 MG Oral Tab Take 81 mg by mouth daily. Disp:  Rfl:    Amitriptyline HCl 10 MG Oral Tab TAKE 1 TABLET BY MOUTH NIGHTLY Disp: 90 tablet Rfl: 1   omeprazole 20 MG Oral Capsule Delayed Release Take 1 capsule by mouth every other day. Disp:  Rfl: 3   Atenolol-Chlorthalidone 100-25 MG Oral Tab Take 1 tablet by mouth daily. Disp: 90 tablet Rfl: 1   valsartan 320 MG Oral Tab Take 1 tablet (320 mg total) by mouth daily. Disp: 90 tablet Rfl: 1   Doxazosin Mesylate (CARDURA) 4 MG Oral Tab Take 1 tablet (4 mg total) by mouth nightly. Disp: 90 tablet Rfl: 1   VITAMIN D3 1000 UNIT OR CAPS 1 CAPSULE DAILY Disp:  Rfl:    MULTIVITAMINS OR TABS 1 TABLET DAILY Disp:  Rfl:         Allergies: Pcn [penicillins]      Anesthesia Evaluation    Patient summary reviewed.    Anesthetic Complications           GI/Hepatic/Renal           PUD: gastritis.                      Cardiovascular                  (+) hypertension                     CHF: diastolic dys.                Endo/Other                                  Pulmonary                           Neuro/Psych                              Restless leg syndrome        Past Surgical History:   Procedure Laterality Date    Colonoscopy  2010    POLYP/5YRS, Dr Garcia    Egd and esd - internal  2017    Esophageal bx benign, pancreatic cyst aspirartion.  Dr Holland    Tonsillectomy      Upper gi endoscopy - referral  6/22/10     Social History     Tobacco Use    Smoking status: Former     Current packs/day: 0.00     Types: Cigarettes     Quit date: 1958     Years since quittin.1    Smokeless tobacco: Never   Substance Use Topics    Alcohol use: Not Currently     Comment: 2-3/MONTH     History   Drug Use No     Available pre-op labs reviewed.  Lab Results   Component Value Date    WBC 9.1 2025    RBC 4.47 2025    HGB 13.4 2025    HCT 38.3 (L) 2025    MCV 85.7 2025    MCH 30.0 2025    MCHC 35.0 2025    RDW 12.5 2025    .0 (L) 2025     Lab Results   Component Value Date     2025    K 3.8 2025     2025    CO2 25.0 2025    BUN 17 2025    CREATSERUM 1.24 2025     (H) 2025    CA 9.2 2025            Airway      Mallampati: II  Mouth opening: >3 FB  TM distance: > 6 cm  Neck ROM: full Cardiovascular             Dental             Pulmonary                     Other findings              ASA: 2   Plan: MAC  NPO status verified and     Post-procedure pain management plan discussed with surgeon and patient.      Plan/risks discussed with: patient                Present on Admission:  **None**

## 2025-02-10 NOTE — OPERATIVE REPORT
[facility] OPERATIVE REPORT   PATIENT NAME: Shaq Song  MRN: [id number]  DATE OF OPERATION: 2/10/2025  PREOPERATIVE DIAGNOSIS: pancreas mass  POSTOPERATIVE DIAGNOSIS:    1.  Multiple cysts in pancreas with solid 2.5cm mass in tail of pancreas; mass abutting splenic artery with involving other vessels such as celiac artery, SMA, SMV   2.  Pancreas cyst    - 13.5mm, 9.4mm in neck     - 13.6mm    - 1.7cm cyst in tail of pancreas with adjacent 2.5 hypoechoic mass  PROCEDURE PERFORMED: upper endoscopy/ ENDOSCOPIC ultrasound  SEDATION MEDICATIONS: MAC  PREOPERATIVE MEDICATIONS:  LEVOfloxacin ; 500cc Lactated Ringer solution IV  PREPROCEDURE ASSESSMENT: The indication for this procedure is to assess for cysts. The patient was identified by myself and nursing staff in the exam room. Informed consent was obtained. The patient was seen in clinic and a full H&P was obtained. On brief physical examination, airway is patent. Chest is clear. Heart has regular rate and rhythm. Abdomen is soft, nontender with good bowel sounds. A medication list was taken by nursing today and reviewed by myself. The patient is an ASA grade 2.   PROCEDURE NOTE: The procedure was completed without difficulty. The patient tolerated the procedure well.   Upper endoscopy (EGD):  The endoscope was inserted through the mouth and advanced to the level of the duodenum, 3rd portion.  Visualized portion of the esophagus, stomach including antrum, body, fundus and cardiac, and duodenum were normal.  Endoscopic ultrasound (EUS):  Endoscopic ultrasound was performed using the linear echoendoscope.  Images were obtained.    LIVER: Left lobe of the liver was visualized and no mass or lesions seen.  No intrahepatic duct dilation was noted.  Portal vein was noted to come out of the liver and the portal confluence was seen and pancreas was noted.   PANCREAS:  Pancreatic neck, body, and tail were interrogated from the gastric body while the neck, head and  uncinate were examined from the 1st and 2nd duodenum.  PD- normal caliber throughout  Pancreas divisum:  no  Chronic pancreatitis changes:  no  Neoplasm: yes - in the tail there is a large hypoechoic mass abutting the splenic artery but no other vessels.  Using color flow doppler to make sure there no intervening vessels, a 22G and 25G FNB needle was used to pucnture into the lesion. 2 passes were made.  Adequate tissue was obtained for cytology.  Cysts:   yes  as mentioned above  Biliary Tree: normal   - stones: no  GALLBLADDER:  visualized and was normal without stones or sludge  CELIAC AXIS:  visualized without lymphadenopathy  L ADRENAL GLAND:  visualized   L KIDNEY:  visualized   MEDIASTINUM:  visualized without lymphadenopathy  Scope was withdrawn from the patient and patient tolerated the procedure well.  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     Thank you very much for the consultation.  I really appreciate it.    John Wren MD

## 2025-02-10 NOTE — DISCHARGE INSTRUCTIONS
Home Care Instructions for Endoscopic Ultrasound with Sedation    Diet:  - CLEAR LIQUID DIET UNTIL 12:00 PM TODAY; FOLLOW WITH SOFT DIET as TOLERATED  - Start with light meals to minimize bloating.  - Do not drink alcohol today.    Medication:  - RESUME ELIQUIS IN 48 HOURS- If you have questions about resuming your normal medications, please contact your Primary Care Physician.    Activities:  - Take it easy today. Do not return to work today.  - Do not drive today.  - Do not operate any machinery today (including kitchen equipment).    Endoscopic Ultrasound:  - You may have a sore throat for 2-3 days following the exam. This is normal. Gargling with warm salt water (1/2 tsp salt to 1 glass warm water) or using throat lozenges will help.  - If you experience any sharp pain in your neck, abdomen or chest, vomiting of blood, oral temperature over 100 degrees Fahrenheit, light-headedness or dizziness, or any other problems, contact your doctor.    **If unable to reach your doctor, please go to the [facility]**    - Your referring physician will receive a full report of your examination.  - If you do not hear from your doctor's office within two weeks of your biopsy, please call them for your results.

## 2025-02-20 ENCOUNTER — APPOINTMENT (OUTPATIENT)
Dept: GENERAL RADIOLOGY | Facility: HOSPITAL | Age: 85
End: 2025-02-20
Attending: UROLOGY
Payer: MEDICARE

## 2025-02-20 ENCOUNTER — ANESTHESIA EVENT (OUTPATIENT)
Dept: SURGERY | Facility: HOSPITAL | Age: 85
End: 2025-02-20
Payer: MEDICARE

## 2025-02-20 ENCOUNTER — HOSPITAL ENCOUNTER (OUTPATIENT)
Facility: HOSPITAL | Age: 85
Discharge: HOME | End: 2025-02-22
Attending: UROLOGY | Admitting: UROLOGY
Payer: MEDICARE

## 2025-02-20 ENCOUNTER — ANESTHESIA (OUTPATIENT)
Dept: SURGERY | Facility: HOSPITAL | Age: 85
End: 2025-02-20
Payer: MEDICARE

## 2025-02-20 PROBLEM — R31.0 GROSS HEMATURIA: Status: ACTIVE | Noted: 2025-02-20

## 2025-02-20 LAB
GLUCOSE BLD-MCNC: 234 MG/DL (ref 70–99)
GLUCOSE BLD-MCNC: 239 MG/DL (ref 70–99)
GLUCOSE BLD-MCNC: 246 MG/DL (ref 70–99)
GLUCOSE BLD-MCNC: 274 MG/DL (ref 70–99)
GLUCOSE BLD-MCNC: 277 MG/DL (ref 70–99)

## 2025-02-20 PROCEDURE — 82962 GLUCOSE BLOOD TEST: CPT

## 2025-02-20 PROCEDURE — 0VB08ZZ EXCISION OF PROSTATE, VIA NATURAL OR ARTIFICIAL OPENING ENDOSCOPIC: ICD-10-PCS | Performed by: UROLOGY

## 2025-02-20 PROCEDURE — 0TCB8ZZ EXTIRPATION OF MATTER FROM BLADDER, VIA NATURAL OR ARTIFICIAL OPENING ENDOSCOPIC: ICD-10-PCS | Performed by: UROLOGY

## 2025-02-20 PROCEDURE — 88305 TISSUE EXAM BY PATHOLOGIST: CPT | Performed by: UROLOGY

## 2025-02-20 PROCEDURE — 82365 CALCULUS SPECTROSCOPY: CPT | Performed by: UROLOGY

## 2025-02-20 PROCEDURE — 88300 SURGICAL PATH GROSS: CPT | Performed by: UROLOGY

## 2025-02-20 RX ORDER — SODIUM CHLORIDE, SODIUM LACTATE, POTASSIUM CHLORIDE, CALCIUM CHLORIDE 600; 310; 30; 20 MG/100ML; MG/100ML; MG/100ML; MG/100ML
INJECTION, SOLUTION INTRAVENOUS CONTINUOUS
Status: DISCONTINUED | OUTPATIENT
Start: 2025-02-20 | End: 2025-02-20 | Stop reason: HOSPADM

## 2025-02-20 RX ORDER — NICOTINE POLACRILEX 4 MG
15 LOZENGE BUCCAL
Status: DISCONTINUED | OUTPATIENT
Start: 2025-02-20 | End: 2025-02-20 | Stop reason: HOSPADM

## 2025-02-20 RX ORDER — NALOXONE HYDROCHLORIDE 0.4 MG/ML
0.08 INJECTION, SOLUTION INTRAMUSCULAR; INTRAVENOUS; SUBCUTANEOUS AS NEEDED
Status: DISCONTINUED | OUTPATIENT
Start: 2025-02-20 | End: 2025-02-20 | Stop reason: HOSPADM

## 2025-02-20 RX ORDER — MEPERIDINE HYDROCHLORIDE 25 MG/ML
12.5 INJECTION INTRAMUSCULAR; INTRAVENOUS; SUBCUTANEOUS AS NEEDED
Status: DISCONTINUED | OUTPATIENT
Start: 2025-02-20 | End: 2025-02-20 | Stop reason: HOSPADM

## 2025-02-20 RX ORDER — CALCIUM CARBONATE 500 MG/1
1000 TABLET, CHEWABLE ORAL
Status: DISCONTINUED | OUTPATIENT
Start: 2025-02-20 | End: 2025-02-22

## 2025-02-20 RX ORDER — DEXAMETHASONE SODIUM PHOSPHATE 4 MG/ML
VIAL (ML) INJECTION AS NEEDED
Status: DISCONTINUED | OUTPATIENT
Start: 2025-02-20 | End: 2025-02-20 | Stop reason: SURG

## 2025-02-20 RX ORDER — HYDROCODONE BITARTRATE AND ACETAMINOPHEN 5; 325 MG/1; MG/1
1 TABLET ORAL EVERY 6 HOURS PRN
Status: DISCONTINUED | OUTPATIENT
Start: 2025-02-20 | End: 2025-02-22

## 2025-02-20 RX ORDER — LABETALOL HYDROCHLORIDE 5 MG/ML
INJECTION, SOLUTION INTRAVENOUS AS NEEDED
Status: DISCONTINUED | OUTPATIENT
Start: 2025-02-20 | End: 2025-02-20 | Stop reason: SURG

## 2025-02-20 RX ORDER — FINASTERIDE 5 MG/1
5 TABLET, FILM COATED ORAL DAILY
Status: DISCONTINUED | OUTPATIENT
Start: 2025-02-20 | End: 2025-02-22

## 2025-02-20 RX ORDER — ONDANSETRON 2 MG/ML
INJECTION INTRAMUSCULAR; INTRAVENOUS AS NEEDED
Status: DISCONTINUED | OUTPATIENT
Start: 2025-02-20 | End: 2025-02-20 | Stop reason: SURG

## 2025-02-20 RX ORDER — AMITRIPTYLINE HYDROCHLORIDE 10 MG/1
10 TABLET ORAL EVERY EVENING
Status: DISCONTINUED | OUTPATIENT
Start: 2025-02-20 | End: 2025-02-22

## 2025-02-20 RX ORDER — SODIUM CHLORIDE, SODIUM LACTATE, POTASSIUM CHLORIDE, CALCIUM CHLORIDE 600; 310; 30; 20 MG/100ML; MG/100ML; MG/100ML; MG/100ML
INJECTION, SOLUTION INTRAVENOUS CONTINUOUS
Status: DISCONTINUED | OUTPATIENT
Start: 2025-02-20 | End: 2025-02-20

## 2025-02-20 RX ORDER — ROCURONIUM BROMIDE 10 MG/ML
INJECTION, SOLUTION INTRAVENOUS AS NEEDED
Status: DISCONTINUED | OUTPATIENT
Start: 2025-02-20 | End: 2025-02-20 | Stop reason: SURG

## 2025-02-20 RX ORDER — INSULIN ASPART 100 [IU]/ML
INJECTION, SOLUTION INTRAVENOUS; SUBCUTANEOUS ONCE
Status: COMPLETED | OUTPATIENT
Start: 2025-02-20 | End: 2025-02-20

## 2025-02-20 RX ORDER — HYDROMORPHONE HYDROCHLORIDE 1 MG/ML
0.4 INJECTION, SOLUTION INTRAMUSCULAR; INTRAVENOUS; SUBCUTANEOUS EVERY 5 MIN PRN
Status: DISCONTINUED | OUTPATIENT
Start: 2025-02-20 | End: 2025-02-20 | Stop reason: HOSPADM

## 2025-02-20 RX ORDER — HYDROMORPHONE HYDROCHLORIDE 1 MG/ML
INJECTION, SOLUTION INTRAMUSCULAR; INTRAVENOUS; SUBCUTANEOUS
Status: COMPLETED
Start: 2025-02-20 | End: 2025-02-20

## 2025-02-20 RX ORDER — DOCUSATE SODIUM 100 MG/1
100 CAPSULE, LIQUID FILLED ORAL 2 TIMES DAILY
Status: DISCONTINUED | OUTPATIENT
Start: 2025-02-20 | End: 2025-02-22

## 2025-02-20 RX ORDER — DEXTROSE MONOHYDRATE 25 G/50ML
50 INJECTION, SOLUTION INTRAVENOUS
Status: DISCONTINUED | OUTPATIENT
Start: 2025-02-20 | End: 2025-02-20 | Stop reason: HOSPADM

## 2025-02-20 RX ORDER — HYDROMORPHONE HYDROCHLORIDE 1 MG/ML
0.2 INJECTION, SOLUTION INTRAMUSCULAR; INTRAVENOUS; SUBCUTANEOUS EVERY 4 HOURS PRN
Status: DISCONTINUED | OUTPATIENT
Start: 2025-02-20 | End: 2025-02-22

## 2025-02-20 RX ORDER — HYDROMORPHONE HYDROCHLORIDE 1 MG/ML
0.2 INJECTION, SOLUTION INTRAMUSCULAR; INTRAVENOUS; SUBCUTANEOUS EVERY 5 MIN PRN
Status: DISCONTINUED | OUTPATIENT
Start: 2025-02-20 | End: 2025-02-20 | Stop reason: HOSPADM

## 2025-02-20 RX ORDER — EPHEDRINE SULFATE 50 MG/ML
INJECTION INTRAVENOUS AS NEEDED
Status: DISCONTINUED | OUTPATIENT
Start: 2025-02-20 | End: 2025-02-20 | Stop reason: SURG

## 2025-02-20 RX ORDER — MORPHINE SULFATE 2 MG/ML
0.5 INJECTION, SOLUTION INTRAMUSCULAR; INTRAVENOUS EVERY 2 HOUR PRN
Status: DISCONTINUED | OUTPATIENT
Start: 2025-02-20 | End: 2025-02-22

## 2025-02-20 RX ORDER — NICOTINE POLACRILEX 4 MG
30 LOZENGE BUCCAL
Status: DISCONTINUED | OUTPATIENT
Start: 2025-02-20 | End: 2025-02-20 | Stop reason: HOSPADM

## 2025-02-20 RX ORDER — ACETAMINOPHEN 500 MG
1000 TABLET ORAL ONCE
Status: DISCONTINUED | OUTPATIENT
Start: 2025-02-20 | End: 2025-02-20 | Stop reason: HOSPADM

## 2025-02-20 RX ORDER — VALSARTAN 320 MG/1
320 TABLET ORAL DAILY
Status: DISCONTINUED | OUTPATIENT
Start: 2025-02-20 | End: 2025-02-22

## 2025-02-20 RX ORDER — MORPHINE SULFATE 2 MG/ML
1 INJECTION, SOLUTION INTRAMUSCULAR; INTRAVENOUS EVERY 2 HOUR PRN
Status: DISCONTINUED | OUTPATIENT
Start: 2025-02-20 | End: 2025-02-22

## 2025-02-20 RX ORDER — MORPHINE SULFATE 2 MG/ML
2 INJECTION, SOLUTION INTRAMUSCULAR; INTRAVENOUS EVERY 2 HOUR PRN
Status: DISCONTINUED | OUTPATIENT
Start: 2025-02-20 | End: 2025-02-22

## 2025-02-20 RX ORDER — ACETAMINOPHEN 325 MG/1
650 TABLET ORAL EVERY 6 HOURS PRN
Status: DISCONTINUED | OUTPATIENT
Start: 2025-02-20 | End: 2025-02-22

## 2025-02-20 RX ORDER — CALCIUM CARBONATE 500 MG/1
500 TABLET, CHEWABLE ORAL
Status: DISCONTINUED | OUTPATIENT
Start: 2025-02-20 | End: 2025-02-22

## 2025-02-20 RX ORDER — HYDROMORPHONE HYDROCHLORIDE 1 MG/ML
0.4 INJECTION, SOLUTION INTRAMUSCULAR; INTRAVENOUS; SUBCUTANEOUS EVERY 4 HOURS PRN
Status: DISCONTINUED | OUTPATIENT
Start: 2025-02-20 | End: 2025-02-22

## 2025-02-20 RX ORDER — METOPROLOL TARTRATE 75 MG/1
75 TABLET ORAL 2 TIMES DAILY
Status: DISCONTINUED | OUTPATIENT
Start: 2025-02-20 | End: 2025-02-20 | Stop reason: SDUPTHER

## 2025-02-20 RX ORDER — ONDANSETRON 2 MG/ML
4 INJECTION INTRAMUSCULAR; INTRAVENOUS EVERY 6 HOURS PRN
Status: DISCONTINUED | OUTPATIENT
Start: 2025-02-20 | End: 2025-02-20 | Stop reason: HOSPADM

## 2025-02-20 RX ORDER — HYDROMORPHONE HYDROCHLORIDE 1 MG/ML
0.8 INJECTION, SOLUTION INTRAMUSCULAR; INTRAVENOUS; SUBCUTANEOUS EVERY 4 HOURS PRN
Status: DISCONTINUED | OUTPATIENT
Start: 2025-02-20 | End: 2025-02-22

## 2025-02-20 RX ORDER — HYDROCODONE BITARTRATE AND ACETAMINOPHEN 5; 325 MG/1; MG/1
1 TABLET ORAL ONCE AS NEEDED
Status: DISCONTINUED | OUTPATIENT
Start: 2025-02-20 | End: 2025-02-20 | Stop reason: HOSPADM

## 2025-02-20 RX ORDER — MAGNESIUM HYDROXIDE 1200 MG/15ML
3000 LIQUID ORAL CONTINUOUS
Status: DISCONTINUED | OUTPATIENT
Start: 2025-02-20 | End: 2025-02-22

## 2025-02-20 RX ORDER — INSULIN ASPART 100 [IU]/ML
INJECTION, SOLUTION INTRAVENOUS; SUBCUTANEOUS
Status: COMPLETED
Start: 2025-02-20 | End: 2025-02-20

## 2025-02-20 RX ORDER — SODIUM CHLORIDE 9 MG/ML
INJECTION, SOLUTION INTRAVENOUS CONTINUOUS
Status: DISCONTINUED | OUTPATIENT
Start: 2025-02-20 | End: 2025-02-22

## 2025-02-20 RX ORDER — ACETAMINOPHEN 500 MG
1000 TABLET ORAL ONCE AS NEEDED
Status: DISCONTINUED | OUTPATIENT
Start: 2025-02-20 | End: 2025-02-20 | Stop reason: HOSPADM

## 2025-02-20 RX ADMIN — SODIUM CHLORIDE, SODIUM LACTATE, POTASSIUM CHLORIDE, CALCIUM CHLORIDE: 600; 310; 30; 20 INJECTION, SOLUTION INTRAVENOUS at 08:38:00

## 2025-02-20 RX ADMIN — EPHEDRINE SULFATE 10 MG: 50 INJECTION INTRAVENOUS at 07:50:00

## 2025-02-20 RX ADMIN — LABETALOL HYDROCHLORIDE 15 MG: 5 INJECTION, SOLUTION INTRAVENOUS at 08:31:00

## 2025-02-20 RX ADMIN — ROCURONIUM BROMIDE 50 MG: 10 INJECTION, SOLUTION INTRAVENOUS at 08:19:00

## 2025-02-20 RX ADMIN — SODIUM CHLORIDE, SODIUM LACTATE, POTASSIUM CHLORIDE, CALCIUM CHLORIDE: 600; 310; 30; 20 INJECTION, SOLUTION INTRAVENOUS at 07:08:00

## 2025-02-20 RX ADMIN — DEXAMETHASONE SODIUM PHOSPHATE 4 MG: 4 MG/ML VIAL (ML) INJECTION at 07:26:00

## 2025-02-20 RX ADMIN — ONDANSETRON 4 MG: 2 INJECTION INTRAMUSCULAR; INTRAVENOUS at 07:27:00

## 2025-02-20 NOTE — INTERVAL H&P NOTE
Pre-op Diagnosis: BLADDER STONES; URINARY RETENTION; BPH WITH URINARY OBSTRUCTION    The above referenced H&P was reviewed by Jose Palencia MD on 2/20/2025, the patient was examined and no significant changes have occurred in the patient's condition since the H&P was performed.  I discussed with the patient and/or legal representative the potential benefits, risks and side effects of this procedure; the likelihood of the patient achieving goals; and potential problems that might occur during recuperation.  I discussed reasonable alternatives to the procedure, including risks, benefits and side effects related to the alternatives and risks related to not receiving this procedure.  We will proceed with procedure as planned.    Recently admitted with ongoing catheter pain, had GI testing and now has pancreatic cancer  Will be seeing [facility] next week to review options for treatment  Continues to have some leakage around the catheter and some on and off blood in the urine    No other new changes    BP (!) 162/79   Pulse 67   Temp 97.6 °F (36.4 °C) (Temporal)   Resp 16   Ht 5' 7\" (1.702 m)   Wt 164 lb 12.8 oz (74.8 kg)   SpO2 99%   BMI 25.81 kg/m²   Spouse present  A+Ox3  Blood thinner on hold  LUNGS non labored  COR has hx A fib  ABD is without rebound tenderness  Morocho in place    Bladder stones  Urine retention  Enlarged prostate    Surgical risks, benefits and alternatives discussed.  Risks of transurethral resection of prostate with bipolar resection and laser lithotripsy of bladder stones including but not limited to bleeding, infection, new or continued urinary retention, urgency, dysuria, incontinence, changes in ejaculation, and need for discharge home with indwelling catheter reviewed.  Possibility of urinary retention with first voiding trial discussed.  Patient aware of need for anesthesia but decreased bleeding and morbidity over standard TURP.  Patient aware procedure does not always diagnose for prostate  cancer.

## 2025-02-20 NOTE — ANESTHESIA POSTPROCEDURE EVALUATION
[facility]    Shaq Song Patient Status:  Hospital Outpatient Surgery   Age/Gender 84 year old male MRN [id number]   Location [facility] SURGERY Attending Jose Palencia MD   Hosp Day # 0 PCP Alis Delgado MD       Anesthesia Post-op Note    CYSTOSCOPY, CYSTOLITHOLAPAXY WITH HOLMIUM LASER, TRANSURETHRAL RESECTION OF PROSTATE    Procedure Summary       Date: 02/20/25 Room / Location:  MAIN OR  /  MAIN OR    Anesthesia Start: 0705 Anesthesia Stop: 0916    Procedure: CYSTOSCOPY, CYSTOLITHOLAPAXY WITH HOLMIUM LASER, TRANSURETHRAL RESECTION OF PROSTATE (Bladder) Diagnosis: (BLADDER STONES; URINARY RETENTION; BPH WITH URINARY OBSTRUCTION)    Surgeons: Jose Palencia MD Anesthesiologist: Dwayne Schneider MD    Anesthesia Type: general ASA Status: 3            Anesthesia Type: No value filed.    Vitals Value Taken Time   /68 02/20/25 0922   Temp 97.7 02/20/25 0922   Pulse 66 02/20/25 0922   Resp 18 02/20/25 0922   SpO2 98% 02/20/25 0922           Patient Location: PACU    Anesthesia Type: general    Airway Patency: patent    Postop Pain Control: adequate    Mental Status: mildly sedated but able to meaningfully participate in the post-anesthesia evaluation    Nausea/Vomiting: none    Cardiopulmonary/Hydration status: stable euvolemic    Complications: no apparent anesthesia related complications    Postop vital signs: stable    Dental Exam: Unchanged from Preop    Patient to be discharged from PACU when criteria met.

## 2025-02-20 NOTE — PROGRESS NOTES
Patient A&Ox4. Vital signs stable on room air. Pain 7/10; morphine given. 3/10 upon reassessment. Patient complains of nausea and vomiting after morphine. MD notified, medication exchanged for dilaudid. Abdomen soft, nondistended, tender. Bowel sounds present; hypoactive. Patient not passing gas. Moorcho catheter in place, pink-tinged output. Clear liquid diet being tolerated well. Patient and wife updated on plan of care. Questions and concerns discussed.     Admission skin assessment completed with Aide LEZAMA. No abnormalities noted.

## 2025-02-20 NOTE — CONSULTS
[facility] General Medicine Consult      Reason for consult: Medical Management    Consulted by:     PCP: Alis Delgado MD      History of Present Illness: Patient is a 84 year old male with PMH sig for GERD/gastritis, hyperlipidemia, hypertension, PE, BPH, chronic urinary retention who presents for cystoscopy with cystolitholopaxy and TURP.  Postoperatively patient is doing well, hemodynamic stable.  Denies chest pain shortness of breath.      PMH:  Past Medical History:    Actinic keratoses    Arrhythmia    AFib    Bladder stones    Colon polyp    DEPRESSION    Depression    Diabetes (HCC)    Enlarged prostate with urinary retention    Esophageal reflux    Morocho catheter in place    Gastritis    Gastro-esophageal reflux disease with esophagitis    Glucose intolerance    Hearing impaired person, bilateral    Hiatal hernia    High blood pressure    HYPERTRIGLYCERIDEMIA    Incontinence    Has a urinary cathether, can be incontinent of stool with urinary urgency    LAE (left atrial enlargement)    LAE (left atrial enlargement)    Left ventricular diastolic dysfunction with preserved systolic function    LVH (left ventricular hypertrophy)    Mild ascending aorta dilation    Osteoarthrosis, unspecified whether generalized or localized, unspecified site    Other and unspecified hyperlipidemia    Pancreas cancer (HCC)    Pancreas cyst (HCC)    bx    Pulmonary embolism (HCC)    Many years ago    RENAL DISEASE    RLS (restless legs syndrome)    Synovial cyst of lumbar facet joint    Unspecified essential hypertension    Vasovagal syncope    Visual impairment    glasses        PSH:  Past Surgical History:   Procedure Laterality Date    Colonoscopy  06/22/2010    POLYP/5YRS, Dr Garcia    Egd and esd - internal  12/11/2017    Esophageal bx benign, pancreatic cyst aspirartion.  Dr Holland    Remove bladder stone,>2.5cm  02/20/2025    with TURP    Tonsillectomy      Transurethral elec-surg prostatectom  02/20/2025     with bladder stones laser litho    Upper gi endoscopy - referral  2010    Upper gi endoscopy,exam  02/10/2025        Home Medications:  Medications Taking[1]    Scheduled Medication:   valsartan  320 mg Oral Daily    amitriptyline  10 mg Oral QPM    finasteride  5 mg Oral Daily    docusate sodium  100 mg Oral BID    metoprolol tartrate  75 mg Oral 2x Daily(Beta Blocker)    insulin aspart  2-10 Units Subcutaneous TID AC and HS     Continuous Infusing Medication:   sodium chloride      sodium chloride       PRN Medication:  morphINE **OR** morphINE **OR** morphINE    acetaminophen    HYDROcodone-acetaminophen    HYDROmorphone **OR** HYDROmorphone **OR** HYDROmorphone     ALL:  Allergies[2]     Soc Hx:  Social History     Tobacco Use    Smoking status: Former     Current packs/day: 0.00     Types: Cigarettes     Quit date: 1958     Years since quittin.1    Smokeless tobacco: Never   Substance Use Topics    Alcohol use: Not Currently     Comment: 2-3/MONTH        Fam Hx  Family History   Problem Relation Age of Onset    Cancer Father         esophageal    Cancer Mother         breast       Review of Systems  Comprehensive ROS reviewed and negative except for what's stated above.  Including negative for chest pain, shortness of breath, syncope.       OBJECTIVE:  /80 (BP Location: Left arm)   Pulse 66   Temp 97.6 °F (36.4 °C) (Oral)   Resp 16   Ht 5' 7\" (1.702 m)   Wt 164 lb 12.8 oz (74.8 kg)   SpO2 99%   BMI 25.81 kg/m²   Physical Exam:  General: Alert, awake, cooperative.  HEENT:  Normocephalic, atraumatic.  Neck:  Trachea midline.  No JVD.   Chest:  Clear to auscultation bilaterally. No wheezes, rales, or rhonchi.  CV:  Regular rate and rhythm.  Positive S1/S2. No murmur, no gallops, no rubs  GI: Bowel sounds present in all four quadrants, abdomen is soft, non-tender, non-distended.  Extremities:  No lower extremity edema or cyanosis.  Neurological:  AAOx3.  Moving all extremities.  Skin:   Warm and dry.      Diagnostics:   CBC/Chem  No results for input(s): \"WBC\", \"HGB\", \"MCV\", \"PLT\", \"BAND\", \"INR\" in the last 168 hours.    Invalid input(s): \"LYM#\", \"MONO#\", \"BASOS#\", \"EOSIN#\"    No results for input(s): \"NA\", \"K\", \"CL\", \"CO2\", \"BUN\", \"CREATSERUM\", \"GLU\", \"CA\", \"CAION\", \"MG\", \"PHOS\" in the last 168 hours.    No results for input(s): \"ALT\", \"AST\", \"ALB\", \"AMYLASE\", \"LIPASE\", \"LDH\" in the last 168 hours.    Invalid input(s): \"ALPHOS\", \"TBIL\", \"DBIL\", \"TPROT\"      Radiology: No results found.       ASSESSMENT / PLAN:  84 year old male with PMH sig for GERD/gastritis, hyperlipidemia, hypertension, PE, BPH, chronic urinary retention who presents for cystoscopy with cystolitholopaxy and TURP.      #Bladder stones s/p cystolitholopaxy  #BPH s/p TURP  -Pain control as needed  -Management per urology  -Advance diet  -PT/OT    #T2DM  -SSI    #Hypertension  -Continue metoprolol and valsartan    #Depression  -Continue amitriptyline    Discussed with SUE Busby DO  [facility] Hospitalist                             [1]   Outpatient Medications Marked as Taking for the 25 encounter (Hospital Encounter)   Medication Sig Dispense Refill    Metoprolol Tartrate 75 MG Oral Tab Take 75 mg by mouth 2 (two) times daily.      apixaban 5 MG Oral Tab Take 1 tablet (5 mg total) by mouth 2 (two) times daily.      finasteride 5 MG Oral Tab Take 1 tablet (5 mg total) by mouth daily.      metFORMIN 500 MG Oral Tab Take 2 tablets (1,000 mg total) by mouth daily with breakfast.      [] phenazopyridine 100 MG Oral Tab Take 1 tablet (100 mg total) by mouth 3 (three) times daily as needed for Pain. 15 tablet 0    valsartan 320 MG Oral Tab Take 1 tablet (320 mg total) by mouth daily. 90 tablet 3    amitriptyline 10 MG Oral Tab Take 1 tablet (10 mg total) by mouth every evening. 90 tablet 3    aspirin 81 MG Oral Tab Take 1 tablet (81 mg total) by mouth daily.      Cholecalciferol (VITAMIN D3 OR) Take 1  tablet by mouth daily.      MULTIVITAMINS OR TABS Take 1 tablet by mouth daily.     [2]   Allergies  Allergen Reactions    Pcn [Penicillins]      \" FAINTED\"

## 2025-02-20 NOTE — ANESTHESIA PROCEDURE NOTES
Airway  Date/Time: 2/20/2025 7:15 AM  Urgency: elective      General Information and Staff    Patient location during procedure: OR  Anesthesiologist: Dwayne Schneider MD  Resident/CRNA: Xavier Roman CRNA  Performed: CRNA   Performed by: Xaiver Roman CRNA  Authorized by: Dwayne Schneider MD      Indications and Patient Condition  Indications for airway management: anesthesia  Sedation level: deep  Preoxygenated: yes  Patient position: sniffing  Mask difficulty assessment: 1 - vent by mask    Final Airway Details  Final airway type: supraglottic airway      Successful airway: intubating  Size 4       Number of attempts at approach: 1

## 2025-02-20 NOTE — OPERATIVE REPORT
[facility]   part of [facility]    Operative Note         Shaq Song Location: OR   [facility] [id number] MRN [id number]   Admission Date 2/20/2025 Operation Date 2/20/2025   Attending Physician Jose Palencia MD       Patient Name: Shaq Song     Preoperative Diagnosis: BLADDER STONES; URINARY RETENTION; BPH WITH URINARY OBSTRUCTION     Postoperative Diagnosis: BLADDER STONES; URINARY RETENTION; BPH WITH URINARY OBSTRUCTION     Procedure(s):  CYSTOSCOPY, CYSTOLITHOLAPAXY WITH HOLMIUM LASER, TRANSURETHRAL RESECTION OF PROSTATE     Primary Surgeon: Jose Palencia MD           Anesthesia: General     Specimen:   ID Type Source Tests Collected by Time Destination   1 : Bladder stone Calculus Stone (calculus) CALCULI, URINARY, SURGICAL PATHOLOGY TISSUE Jose Palencia MD 2/20/2025 0809    2 : prostate Tissue Tissue Prostate SURGICAL PATHOLOGY TISSUE Jose Palencia MD 2/20/2025 0809         Estimated Blood Loss: Blood Output: 5 mL (2/20/2025  9:03 AM)  Complications: none      Indications for procedure: urinary retention, hematuria, has large bladder stones, has enlarged prostate, has bender catheter. Desires surgery.     Surgical Findings: 2 large bladder stones. Largest is 2.5cm  Enlarged prostate     Complexity: NA (optional)    Operative Summary:     Specimen: Prostate chips. Bladder stones    Drains: 22 Fr bender catheter. 3 way for CBI    Complications: None    Procedure Summary: After informed consent was obtained and in the chart, preoperative antibiotics were given, and the patient was taken to the operating room suite and placed on the operating room table in supine position. After bilateral sequential compression devices had been applied and satisfactory general anesthesia had been achieved, the patients' legs were raised to a dorsal lithotomy position. The patients' groin was prepped and draped in the usual sterile fashion. 22.5Fr  cystoscopy was placed per urethra.  Urethra is normal.  Prostate is enlarged.   Entry into bladder shows no cystitis. No tumors. No significant diverticuli.  2+ trabeculation. Trigone and UOs are visualized. 2 large stones. 2cm and 2.5cm identified.  550 micron holmium laser fiber used at settings 1.2J and 20Hz to fracture the stones.  Pieces irrigated out.  No significant mucosal bruising from the stones manipulation. The urethra was calibrated to accommodate the 26 Fr resectoscope sheath. A well lubricated 26 Fr resectoscope sheath was introduced with a normal anterior male urethra through a prostatic fossa demonstrative of obstructing trilobar hyperplasia and into the bladder. The working element with a bipolar resectoscope loop was placed. Resection was then started of the median lobe which was taken down to the level of the floor of the bladder and the capsular fibers. Resection was then continued distally and circumferentially with care taken throughout the procedure not to resect beyond the verumontanum and sparing the very anterior tissue, thereby leaving the external urinary sphincter completely intact and controlling from bleeding. There is still lateral lobes that extend way past the veru that were not resected to help preserve sphincter function. The prostatic fossa was then re-inspected under a low pressure, low flow state and additional hemostasis was achieved. The prostatic fossa was seen to be widely patent from the bladder neck through the apex of the prostate. The bladder was again filled and the bilateral ureteral orifices were re-inspected and seen to be unharmed in the procedure. Prostate chips were all irrigated out for specimen. A 22 Fr bender catheter was placed with the balloon was inflated with 40 mL of sterile water and placed to gravity drainage. Drip rate CBI was started. The catheter was seen to drain pink irrigant. The procedure was terminated.The patient tolerated the procedure well, was extubated in the OR and transferred to the PACU in stable condition with no  immediately apparent complications. He will be monitored overnight on CBI and has a future appt with RN for bender catheter removal and with physician visit later this month.        Implants: * No implants in log *     Drains: 3 way bender, 22fr     Condition: stable       Jose Palencia MD

## 2025-02-20 NOTE — ANESTHESIA PREPROCEDURE EVALUATION
PRE-OP EVALUATION    Patient Name: Shaq Song    Admit Diagnosis: BLADDER STONES; URINARY RETENTION; BPH WITH URINARY OBSTRUCTION    Pre-op Diagnosis: BLADDER STONES; URINARY RETENTION; BPH WITH URINARY OBSTRUCTION    CYSTOSCOPY, CYSTOLITHOLAPAXY WITH HOLMIUM LASER, TRANSURETHRAL RESECTION OF PROSTATE    Anesthesia Procedure: CYSTOSCOPY, CYSTOLITHOLAPAXY WITH HOLMIUM LASER, TRANSURETHRAL RESECTION OF PROSTATE    Surgeons and Role:     * Jose Palencia MD - Primary    Pre-op vitals reviewed.  Temp: 97.6 °F (36.4 °C)  Pulse: 67  Resp: 16  BP: 162/79  SpO2: 99 %  Body mass index is 25.81 kg/m².    Current medications reviewed.  Hospital Medications:  • acetaminophen (Tylenol Extra Strength) tab 1,000 mg  1,000 mg Oral Once   • glucose (Dex4) 15 GM/59ML oral liquid 15 g  15 g Oral Q15 Min PRN    Or   • glucose (Glutose) 40% oral gel 15 g  15 g Oral Q15 Min PRN    Or   • glucose-vitamin C (Dex-4) chewable tab 4 tablet  4 tablet Oral Q15 Min PRN    Or   • dextrose 50% injection 50 mL  50 mL Intravenous Q15 Min PRN    Or   • glucose (Dex4) 15 GM/59ML oral liquid 30 g  30 g Oral Q15 Min PRN    Or   • glucose (Glutose) 40% oral gel 30 g  30 g Oral Q15 Min PRN    Or   • glucose-vitamin C (Dex-4) chewable tab 8 tablet  8 tablet Oral Q15 Min PRN   • lactated ringers infusion   Intravenous Continuous   • ceFAZolin (Ancef) 2g in 10mL IV syringe premix  2 g Intravenous Once   • [COMPLETED] HYDROmorphone (Dilaudid) 1 MG/ML injection 0.5 mg  0.5 mg Intravenous Once       Outpatient Medications:   Prescriptions Prior to Admission[1]    Allergies: Pcn [penicillins]      Anesthesia Evaluation    Patient summary reviewed.    Anesthetic Complications  (-) history of anesthetic complications         GI/Hepatic/Renal      (+) GERD                           Cardiovascular  Comment: CONCLUSIONS:  1.  Mildly dilated ascending thoracic aorta at the sinuses of Valsalva and mid ascending aorta as described.  This is below the size for  consideration of surgical intervention.  2.  Mitral valve prolapse with partial flail, medial aspect of the P2 scapule as described above.  This is associated with moderate-severe (3-4+) mitral regurgitation into an enlarged left atrium.                        (+) hypertension   (+) hyperlipidemia          (+) valvular problems/murmurs and MR    (+) dysrhythmias and atrial fibrillation                  Endo/Other      (+) diabetes and poorly controlled,              (+) clotting disorder (hx PE)             Pulmonary                           Neuro/Psych      (+) depression           (+) neuromuscular disease             Pancreatic ca      Past Surgical History:   Procedure Laterality Date   • Colonoscopy  2010    POLYP/5YRS, Dr Garcia   • Egd and esd - internal  2017    Esophageal bx benign, pancreatic cyst aspirartion.  Dr Holland   • Tonsillectomy     • Upper gi endoscopy - referral  2010   • Upper gi endoscopy,exam  02/10/2025     Social History     Socioeconomic History   • Marital status:    Tobacco Use   • Smoking status: Former     Current packs/day: 0.00     Types: Cigarettes     Quit date: 1958     Years since quittin.1   • Smokeless tobacco: Never   Vaping Use   • Vaping status: Never Used   Substance and Sexual Activity   • Alcohol use: Not Currently     Comment: 2-3/MONTH   • Drug use: No   • Sexual activity: Yes     Comment:      History   Drug Use No     Available pre-op labs reviewed.  Lab Results   Component Value Date    WBC 9.1 2025    RBC 4.47 2025    HGB 13.4 2025    HCT 38.3 (L) 2025    MCV 85.7 2025    MCH 30.0 2025    MCHC 35.0 2025    RDW 12.5 2025    .0 (L) 2025     Lab Results   Component Value Date     2025    K 3.8 2025     2025    CO2 25.0 2025    BUN 17 2025    CREATSERUM 1.24 2025     (H) 2025    CA 9.2 2025             Airway      Mallampati: II  Mouth opening: 3 FB  TM distance: 4 - 6 cm  Neck ROM: full Cardiovascular      Rhythm: regular  Rate: normal     Dental  Comment: No loose teeth reported by patient           Pulmonary      Breath sounds clear to auscultation bilaterally.               Other findings          ASA: 3   Plan: general  NPO status verified and patient meets guidelines.        Comment: Discussed general anesthesia with endotracheal tube/supraglottic airway with patient. Discussed risk of oral/dental trauma, nausea, rare allergic reactions. Patient understands the risks, benefits, and requirement for anesthesia and willing to proceed. Consent signed.        Plan/risks discussed with: patient              Present on Admission:  **None**             [1]   Medications Prior to Admission   Medication Sig Dispense Refill Last Dose/Taking   • Metoprolol Tartrate 75 MG Oral Tab Take 75 mg by mouth 2 (two) times daily.   2025 at  3:00 AM   • apixaban 5 MG Oral Tab Take 1 tablet (5 mg total) by mouth 2 (two) times daily.   2025   • finasteride 5 MG Oral Tab Take 1 tablet (5 mg total) by mouth daily.   2025   • metFORMIN 500 MG Oral Tab Take 2 tablets (1,000 mg total) by mouth daily with breakfast.   2025   • [] phenazopyridine 100 MG Oral Tab Take 1 tablet (100 mg total) by mouth 3 (three) times daily as needed for Pain. 15 tablet 0 Taking As Needed   • valsartan 320 MG Oral Tab Take 1 tablet (320 mg total) by mouth daily. 90 tablet 3 2025   • amitriptyline 10 MG Oral Tab Take 1 tablet (10 mg total) by mouth every evening. 90 tablet 3 2025   • aspirin 81 MG Oral Tab Take 1 tablet (81 mg total) by mouth daily.   2025   • Cholecalciferol (VITAMIN D3 OR) Take 1 tablet by mouth daily.   2025   • MULTIVITAMINS OR TABS Take 1 tablet by mouth daily.   2025

## 2025-02-21 LAB
ANION GAP SERPL CALC-SCNC: 11 MMOL/L (ref 0–18)
BASOPHILS # BLD AUTO: 0.01 X10(3) UL (ref 0–0.2)
BASOPHILS NFR BLD AUTO: 0.1 %
BUN BLD-MCNC: 12 MG/DL (ref 9–23)
CALCIUM BLD-MCNC: 8.9 MG/DL (ref 8.7–10.6)
CHLORIDE SERPL-SCNC: 104 MMOL/L (ref 98–112)
CO2 SERPL-SCNC: 25 MMOL/L (ref 21–32)
CREAT BLD-MCNC: 1.12 MG/DL
EGFRCR SERPLBLD CKD-EPI 2021: 65 ML/MIN/1.73M2 (ref 60–?)
EOSINOPHIL # BLD AUTO: 0.09 X10(3) UL (ref 0–0.7)
EOSINOPHIL NFR BLD AUTO: 1.2 %
ERYTHROCYTE [DISTWIDTH] IN BLOOD BY AUTOMATED COUNT: 12.3 %
GLUCOSE BLD-MCNC: 181 MG/DL (ref 70–99)
GLUCOSE BLD-MCNC: 198 MG/DL (ref 70–99)
GLUCOSE BLD-MCNC: 204 MG/DL (ref 70–99)
GLUCOSE BLD-MCNC: 218 MG/DL (ref 70–99)
GLUCOSE BLD-MCNC: 235 MG/DL (ref 70–99)
HCT VFR BLD AUTO: 32.8 %
HGB BLD-MCNC: 11.1 G/DL
IMM GRANULOCYTES # BLD AUTO: 0.03 X10(3) UL (ref 0–1)
IMM GRANULOCYTES NFR BLD: 0.4 %
LYMPHOCYTES # BLD AUTO: 1.04 X10(3) UL (ref 1–4)
LYMPHOCYTES NFR BLD AUTO: 13.8 %
MCH RBC QN AUTO: 28.6 PG (ref 26–34)
MCHC RBC AUTO-ENTMCNC: 33.8 G/DL (ref 31–37)
MCV RBC AUTO: 84.5 FL
MONOCYTES # BLD AUTO: 0.67 X10(3) UL (ref 0.1–1)
MONOCYTES NFR BLD AUTO: 8.9 %
NEUTROPHILS # BLD AUTO: 5.7 X10 (3) UL (ref 1.5–7.7)
NEUTROPHILS # BLD AUTO: 5.7 X10(3) UL (ref 1.5–7.7)
NEUTROPHILS NFR BLD AUTO: 75.6 %
OSMOLALITY SERPL CALC.SUM OF ELEC: 294 MOSM/KG (ref 275–295)
PLATELET # BLD AUTO: 103 10(3)UL (ref 150–450)
POTASSIUM SERPL-SCNC: 3.2 MMOL/L (ref 3.5–5.1)
RBC # BLD AUTO: 3.88 X10(6)UL
SODIUM SERPL-SCNC: 140 MMOL/L (ref 136–145)
WBC # BLD AUTO: 7.5 X10(3) UL (ref 4–11)

## 2025-02-21 PROCEDURE — 85025 COMPLETE CBC W/AUTO DIFF WBC: CPT | Performed by: UROLOGY

## 2025-02-21 PROCEDURE — 80048 BASIC METABOLIC PNL TOTAL CA: CPT | Performed by: UROLOGY

## 2025-02-21 PROCEDURE — 82962 GLUCOSE BLOOD TEST: CPT

## 2025-02-21 RX ORDER — PHENAZOPYRIDINE HYDROCHLORIDE 100 MG/1
100 TABLET, FILM COATED ORAL
Status: COMPLETED | OUTPATIENT
Start: 2025-02-21 | End: 2025-02-22

## 2025-02-21 RX ORDER — POTASSIUM CHLORIDE 1500 MG/1
40 TABLET, EXTENDED RELEASE ORAL ONCE
Status: COMPLETED | OUTPATIENT
Start: 2025-02-21 | End: 2025-02-21

## 2025-02-21 NOTE — PROGRESS NOTES
1445  CBI resumed to moderate rate  Clear, no clots via tubing    1800  Extra large incontinent loose bowel movement upon ambulation to bathroom. Partial bed change and cleaning performed.  CBI still running clear no clots via tubing    Alert and oriented x4. Pain medication managed with available MAR regimen, bowel regimen in place, afebrile.    Bowel sounds present, passing gas, belching.  Tolerating diet without nausea.    Denies calf pain. SCDs in place.

## 2025-02-21 NOTE — PROGRESS NOTES
[facility]   part of [facility] Hospitalist Progress Note     Shaq Song Patient Status:  Outpatient in a Bed    10/2/1940 MRN [id number]   Location [facility] 3NW-A Attending Jose Palencia MD   Hosp Day # 0 PCP Alis Delgado MD     Subjective:     Patient seen and examined.   Still having hematuria  C/o bladder spasm, severe pain when urinating  No other issues per pt  Otherwise doing well    Objective:    Review of Systems:   10 point ROS completed and was negative, except for pertinent positive and negatives stated in subjective.    Vital signs:  Temp:  [97.1 °F (36.2 °C)-98.2 °F (36.8 °C)] 97.9 °F (36.6 °C)  Pulse:  [61-71] 66  Resp:  [10-19] 12  BP: (130-167)/(57-80) 143/66  SpO2:  [94 %-99 %] 95 %    Physical Exam:    General: Alert, awake, cooperative.  HEENT:  Normocephalic, atraumatic.  Neck:  Trachea midline.  No JVD.   Chest:  Clear to auscultation bilaterally. No wheezes, rales, or rhonchi.  CV:  Regular rate and rhythm.  Positive S1/S2. No murmur, no gallops, no rubs  GI: Bowel sounds present in all four quadrants, abdomen is soft, non-tender, non-distended.  Extremities:  No lower extremity edema or cyanosis.  Neurological:  AAOx3.  Moving all extremities.  Skin:  Warm and dry.      Diagnostic Data:    Labs:  Recent Labs   Lab 25  0541   WBC 7.5   HGB 11.1*   MCV 84.5   .0*       Recent Labs   Lab 25  0541   *   BUN 12   CREATSERUM 1.12   CA 8.9      K 3.2*      CO2 25.0       Estimated Creatinine Clearance: 45.9 mL/min (based on SCr of 1.12 mg/dL).    No results for input(s): \"PTP\", \"INR\" in the last 168 hours.         COVID-19 Lab Results    COVID-19  Lab Results   Component Value Date    COVID19 Not Detected 2020       Pro-Calcitonin  No results for input(s): \"PCT\" in the last 168 hours.    Cardiac  No results for input(s): \"TROP\", \"PBNP\" in the last 168 hours.    Creatinine Kinase  No results for input(s):  \"CK\" in the last 168 hours.    Inflammatory Markers  No results for input(s): \"CRP\", \"THAD\", \"LDH\", \"DDIMER\" in the last 168 hours.    Imaging: Imaging data reviewed in Epic.    Medications:    valsartan  320 mg Oral Daily    amitriptyline  10 mg Oral QPM    finasteride  5 mg Oral Daily    docusate sodium  100 mg Oral BID    metoprolol tartrate  75 mg Oral 2x Daily(Beta Blocker)    insulin aspart  2-10 Units Subcutaneous TID AC and HS       Assessment & Plan:    84 year old male with PMH sig for GERD/gastritis, hyperlipidemia, hypertension, PE, BPH, chronic urinary retention who presents for cystoscopy with cystolitholopaxy and TURP.     #Bladder stones s/p cystolitholopaxy 2/20/25  #BPH s/p TURP 2/20/25  Post op pain  Main management per urology service, including pain control, wound care, DVT prophylaxis, and disposition  Encourage early ambulation  Encourage I-S use  PT/OT    #Post op anemia  #Post op thrombocytopenia  -monitor hematuria  -monitor hgb  -today 11.1  -transfuse to maintain hgb >7    #DM2  -hold home meds  -ISS +accuchecks    #HTN  -Continue metoprolol and valsartan     #Depression  -Continue amitriptyline       DISPO:  Cont inpt for now  Urology following         Thank you for allowing me to participate in the care of this patient.  I will be following the patient while she is in the hospital.      Chloe De La Rosa MD  [facility] Hospitalist  Pager [phone]  Answering Service number: [phone]

## 2025-02-21 NOTE — PROGRESS NOTES
UROLOGY ADDENDUM  Significant hematuria with clots in proximal tubing since CBI clamped  C/o bladder spasms q 1 hour - VAS 9/10    Pyridium  Fluids  Restart CBI    Delay discharge      Princess Mijares PA-C  Department of Urology  [facility]  [phone]

## 2025-02-21 NOTE — PROGRESS NOTES
A&Ox4. VSS on room air.  GI: Abdomen soft, nondistended. Passing gas.  Denies nausea. Had c/o heart burn, TUMS prn with relief.  : CBI running slow, clear and pink no clots.  Denies pain.  Up with standby assist.  Diet: Cardiac and 1800  IV saline locked.   All appropriate safety measures in place. All questions and concerns addressed.

## 2025-02-21 NOTE — DIETARY NOTE
[facility]   part of [facility]    NUTRITION ASSESSMENT    Pt meets moderate malnutrition criteria at this time.    CRITERIA FOR MALNUTRITION DIAGNOSIS:  Criteria for non-severe malnutrition diagnosis: acute illness/injury related to wt loss 5% in 1 month and energy intake less than 75% for greater than 7 days      NUTRITION INTERVENTION:    RD nutrition Care Plan- See RD nutrition assessment for additional recommendations  Meal and Snacks - Diet liberalized. Monitor and encourage adequate PO intake.   Medical Food Supplements - Glucerna Daily. Rationale/use for oral supplements discussed.    PATIENT STATUS: 02/21/25 84 year old male admitted 2/20 for cystoscopy with cystolitholopaxy and TURP.  Chart reviewed for +MST. Also found to have pancreatic mass with plan for output endoscopic US w/ biopsy per MD. Pt report not having an adequate appetite for a couple weeks. Denies any intolerance or concerns chewing or swallowing. Pt \"doesn't have a taste for anything\". Encouraged PO, consistent meals and usage of ONS. No BM recorded yet but had some diarrhea PTA. Significant wt loss noted this past month. RD to follow    PMH sig for GERD/gastritis, hyperlipidemia, hypertension, PE, BPH, chronic urinary retention    ANTHROPOMETRICS:  Ht: 170.2 cm (5' 7\")  Wt: 74.8 kg (164 lb 12.8 oz).   BMI: Body mass index is 25.81 kg/m².  IBW: 67.3 kg      WEIGHT HISTORY:   Weight loss: 43# (21%) x 1 month, per pt some of the wt loss was intentional but the other half was due to decrease in PO intake    Wt Readings from Last 10 Encounters:   02/20/25 74.8 kg (164 lb 12.8 oz)   02/10/25 75.8 kg (167 lb)   02/06/25 77.1 kg (170 lb)   01/19/25 94 kg (207 lb 3.7 oz)   01/26/22 93 kg (205 lb)   04/12/21 93.9 kg (207 lb)   11/18/20 92.4 kg (203 lb 9.6 oz)   10/13/20 91.6 kg (202 lb)   08/21/20 90.7 kg (200 lb)   08/20/20 91.6 kg (202 lb)        NUTRITION:  Diet:       Procedures    Cardiac diet Cardiac; Calorie Restriction/Carb  Controlled: 1800 kcal/60 grams; Is Patient on Accuchecks? Yes      Food Allergies: No  Cultural/Ethnic/Hinduism Preferences Addressed: Yes    Percent Meals Eaten (last 3 days)       None            GI system review: WNL Last BM Date: 02/18/25  Skin and wounds: intact    NUTRITION RELATED PHYSICAL FINDINGS:     1. Body Fat/Muscle Mass: mild muscle depletion Temple region     2. Fluid Accumulation: none per RN documentation    NUTRITION PRESCRIPTION:  74.8 kg  Actual Body Weight  Calories: 1078-6230 calories/day (25-30 kcal/kg)  Protein:  grams protein/day (1.0-1.5 grams protein per kg)  Fluid: ~1 ml/kcal or per MD discretion    NUTRITION DIAGNOSIS/PROBLEM:  Malnutrition related to insufficient appetite resulting in inadequate nutrition intake as evidenced by documented/reported insufficient oral intake, documented/reported unintentional weight loss, and loss of muscle mass      MONITOR AND EVALUATE/NUTRITION GOALS:  PO intake of 75% of meals TID - New  PO intake of 75% of oral nutrition supplement/s - New  Weight stable within 1 to 2 lbs during admission - New      MEDICATIONS:  Reviewed    LABS:  Reviewed    Pt is at Moderate nutrition risk    Elli Estrada RD, LDN  Clinical Nutrition  [id number]

## 2025-02-21 NOTE — PROGRESS NOTES
[facility]   part of [facility]    Progress Note    Shaq Song Patient Status:  Outpatient in a Bed    10/2/1940 MRN [id number]   Location [facility] 3NW-A Attending Jose Palencia MD   Hosp Day # 0 PCP Alis Delgado MD     Subjective:   Shaq Song is a(n) 84 year old male pod #1 turp admitted for cbi    Objective:   Blood pressure 158/65, pulse 62, temperature 98 °F (36.7 °C), temperature source Oral, resp. rate 12, height 5' 7\" (1.702 m), weight 164 lb 12.8 oz (74.8 kg), SpO2 95%.    General appearance: alert, appears stated age, and cooperative  Abdominal: soft, non-tender; bowel sounds normal; no masses,  no organomegaly  Male genitalia: normal ifc with red urine, cbi running    Results:   Lab Results   Component Value Date    WBC 7.5 2025    HGB 11.1 (L) 2025    HCT 32.8 (L) 2025    .0 (L) 2025    CREATSERUM 1.12 2025    BUN 12 2025     2025    K 3.2 (L) 2025     2025    CO2 25.0 2025     (H) 2025    CA 8.9 2025    ALB 4.5 2025    ALKPHO 58 2025    BILT 1.0 2025    TP 7.2 2025    AST 17 2025    ALT 8 (L) 2025    PTT 31.2 2017    INR 1.04 2017    TSH 1.820 2025    LIP 36 2025    PSA 4.5 (H) 2016    MG 2.1 2017       No results found.        Assessment & Plan:     Gross hematuria  BPH  S/P TURP    Wean CBI  Plan is home with bender pending cbi weaning  Hold anticoagulation until outpt follow up    Princess Mijares PA-C  2025

## 2025-02-21 NOTE — PROGRESS NOTES
1000   CBI clamped per Urology service    1403  CBI with red urine, scant clots, occasional pressure with relief after repositioning. Pain management with PRN tylenol due to mild/moderate bladder pressure, general relief    1330: Spouse updated regarding plan of care with patient over phone    Tolerated AM meal without nausea

## 2025-02-22 VITALS
OXYGEN SATURATION: 97 % | DIASTOLIC BLOOD PRESSURE: 65 MMHG | HEART RATE: 73 BPM | SYSTOLIC BLOOD PRESSURE: 162 MMHG | BODY MASS INDEX: 25.87 KG/M2 | RESPIRATION RATE: 18 BRPM | HEIGHT: 67 IN | WEIGHT: 164.81 LBS | TEMPERATURE: 98 F

## 2025-02-22 LAB
GLUCOSE BLD-MCNC: 188 MG/DL (ref 70–99)
GLUCOSE BLD-MCNC: 219 MG/DL (ref 70–99)
POTASSIUM SERPL-SCNC: 3.4 MMOL/L (ref 3.5–5.1)

## 2025-02-22 PROCEDURE — 82962 GLUCOSE BLOOD TEST: CPT

## 2025-02-22 PROCEDURE — 84132 ASSAY OF SERUM POTASSIUM: CPT | Performed by: INTERNAL MEDICINE

## 2025-02-22 RX ORDER — PHENAZOPYRIDINE HYDROCHLORIDE 100 MG/1
100 TABLET, FILM COATED ORAL 3 TIMES DAILY PRN
Qty: 15 TABLET | Refills: 0 | Status: SHIPPED | OUTPATIENT
Start: 2025-02-22 | End: 2025-03-01

## 2025-02-22 RX ORDER — POTASSIUM CHLORIDE 1.5 G/1.58G
40 POWDER, FOR SOLUTION ORAL ONCE
Status: COMPLETED | OUTPATIENT
Start: 2025-02-22 | End: 2025-02-22

## 2025-02-22 RX ORDER — HYDRALAZINE HYDROCHLORIDE 20 MG/ML
10 INJECTION INTRAMUSCULAR; INTRAVENOUS ONCE
Status: COMPLETED | OUTPATIENT
Start: 2025-02-22 | End: 2025-02-22

## 2025-02-22 RX ORDER — METOPROLOL TARTRATE 75 MG/1
75 TABLET ORAL 2 TIMES DAILY
Status: DISCONTINUED | OUTPATIENT
Start: 2025-02-22 | End: 2025-02-22

## 2025-02-22 NOTE — PROGRESS NOTES
Discharge instructions and bender care and med instructions explained to pt. And pt's wife.  Pt. Verbalized understanding.  Discharged via wc by PCT.

## 2025-02-22 NOTE — PROGRESS NOTES
Alert and oriented x4. Pain none to mild, declining PRN pain management.  CBI clamped per Urology. Orange, clear, no clots.  Saline locked.  Up to bathroom standby assist.    Passing gas, denies nausea, tolerating diet.  3577  Hypertension, hospitalist paged, order for one time Hydralazine      1245  Statlock changed CBI clamped, irrigation set removed    1329  Blood pressure 162/65 on recheck. MD paged

## 2025-02-22 NOTE — PROGRESS NOTES
[facility]   part of [facility] Hospitalist Progress Note     Shaq Song Patient Status:  Outpatient in a Bed    10/2/1940 MRN [id number]   Location [facility] 3NW-A Attending Jose Palencia MD   Hosp Day # 0 PCP Alis Delgado MD     Subjective:     Patient seen and examined.   Still having hematuria  Says burning pain is better now  No other issues per pt  Otherwise doing well    Objective:    Review of Systems:   10 point ROS completed and was negative, except for pertinent positive and negatives stated in subjective.    Vital signs:  Temp:  [97.8 °F (36.6 °C)-98.1 °F (36.7 °C)] 98.1 °F (36.7 °C)  Pulse:  [62-93] 68  Resp:  [18-20] 18  BP: (149-165)/(65-74) 164/66  SpO2:  [95 %-97 %] 96 %    Physical Exam:    General: Alert, awake, cooperative.  HEENT:  Normocephalic, atraumatic.  Neck:  Trachea midline.  No JVD.   Chest:  Clear to auscultation bilaterally. No wheezes, rales, or rhonchi.  CV:  Regular rate and rhythm.  Positive S1/S2. No murmur, no gallops, no rubs  GI: Bowel sounds present in all four quadrants, abdomen is soft, non-tender, non-distended.  Extremities:  No lower extremity edema or cyanosis.  Neurological:  AAOx3.  Moving all extremities.  Skin:  Warm and dry.      Diagnostic Data:    Labs:  Recent Labs   Lab 25  0541   WBC 7.5   HGB 11.1*   MCV 84.5   .0*       Recent Labs   Lab 25  0541 25  0537   *  --    BUN 12  --    CREATSERUM 1.12  --    CA 8.9  --      --    K 3.2* 3.4*     --    CO2 25.0  --        Estimated Creatinine Clearance: 45.9 mL/min (based on SCr of 1.12 mg/dL).    No results for input(s): \"PTP\", \"INR\" in the last 168 hours.         COVID-19 Lab Results    COVID-19  Lab Results   Component Value Date    COVID19 Not Detected 2020       Pro-Calcitonin  No results for input(s): \"PCT\" in the last 168 hours.    Cardiac  No results for input(s): \"TROP\", \"PBNP\" in the last 168  hours.    Creatinine Kinase  No results for input(s): \"CK\" in the last 168 hours.    Inflammatory Markers  No results for input(s): \"CRP\", \"THAD\", \"LDH\", \"DDIMER\" in the last 168 hours.    Imaging: Imaging data reviewed in Epic.    Medications:    phenazopyridine  100 mg Oral TID CC    valsartan  320 mg Oral Daily    amitriptyline  10 mg Oral QPM    finasteride  5 mg Oral Daily    docusate sodium  100 mg Oral BID    metoprolol tartrate  75 mg Oral 2x Daily(Beta Blocker)    insulin aspart  2-10 Units Subcutaneous TID AC and HS       Assessment & Plan:    84 year old male with PMH sig for GERD/gastritis, hyperlipidemia, hypertension, PE, BPH, chronic urinary retention who presents for cystoscopy with cystolitholopaxy and TURP.     #Bladder stones s/p cystolitholopaxy 2/20/25  #BPH s/p TURP 2/20/25  Post op pain  Main management per urology service, including pain control, wound care, DVT prophylaxis, and disposition  Encourage early ambulation  Encourage I-S use  PT/OT    #Post op anemia  #Post op thrombocytopenia  -monitor hematuria  -monitor hgb  -today 11.1  -transfuse to maintain hgb >7    #DM2  -hold home meds  -ISS +accuchecks    #HTN  -Continue metoprolol and valsartan     #Depression  -Continue amitriptyline        DISPO:  Main dc plan per urology  From medical standpoint ok for dc  Medical portion of med rec done           Thank you for allowing me to participate in the care of this patient.  I will be following the patient while she is in the hospital.      Chloe De La Rosa MD  [facility] Hospitalist  Pager [phone]  Answering Service number: [phone]

## 2025-02-22 NOTE — PROGRESS NOTES
[facility]   part of [facility]    Progress Note    Shaq Song Patient Status:  Outpatient in a Bed    10/2/1940 MRN [id number]   Location [facility] 3NW-A Attending Jose Palencia MD   Hosp Day # 0 PCP Alis Delgado MD     Subjective:   Shaq Song is a(n) 84 year old male pod #2 turp admitted for cbi.    CBI stopped yesterday and urine became bloody. Cbi was resumed overnight. Afebrile overnight.     Urine clear on a slow CBI drip this AM.  CBI clamped.     Objective:   Blood pressure (!) 164/66, pulse 68, temperature 98.1 °F (36.7 °C), temperature source Oral, resp. rate 18, height 5' 7\" (1.702 m), weight 164 lb 12.8 oz (74.8 kg), SpO2 96%.    General appearance: alert, appears stated age, and cooperative  Abdominal: soft, non-tender; bowel sounds normal; no masses,  no organomegaly  Male genitalia: normal ifc with red urine, cbi running    Results:   Lab Results   Component Value Date    WBC 7.5 2025    HGB 11.1 (L) 2025    HCT 32.8 (L) 2025    .0 (L) 2025    CREATSERUM 1.12 2025    BUN 12 2025     2025    K 3.4 (L) 2025     2025    CO2 25.0 2025     (H) 2025    CA 8.9 2025    ALB 4.5 2025    ALKPHO 58 2025    BILT 1.0 2025    TP 7.2 2025    AST 17 2025    ALT 8 (L) 2025    PTT 31.2 2017    INR 1.04 2017    TSH 1.820 2025    LIP 36 2025    PSA 4.5 (H) 2016    MG 2.1 2017     Assessment & Plan:     Gross hematuria  BPH  S/P TURP    CBI clamped this AM. Will assess urine color in a few hours. If urine remains fairly clear, then he can be discharged to home with his bender catheter in place. WE will plug the 3rd port with a catheter plug.  Hold anticoagulation until outpt follow up - he has appt. with RN scheduled this week for void trial in the office.     Davey Robison MD  [facility]  Department of  Urology  Office: [phone]    Afternoon addendum:  Urine remained clear off of CBI for several hours this AM.  He can be discharged to home today.  Maintain bender until post-op void trial with RN later this week. Hold ASA and Eliquis until follow-up appt.     Davey Robison MD  [facility]  Department of Urology  Office: [phone]

## 2025-02-25 NOTE — DISCHARGE SUMMARY
[facility]  Discharge Summary    Shaq Song Patient Status:  Outpatient in a Bed    10/2/1940 MRN [id number]   Location [facility] 3NW-A Attending No att. providers found   Hosp Day # 0 PCP Alis Delgado MD         Admit date: 2025    Discharge date and time: 2025  3:53 PM     Admitting Physician: Jose Palencia MD     Discharge Physician: Dr. Davey Robison    Admission Diagnoses: BLADDER STONES; URINARY RETENTION; BPH WITH URINARY OBSTRUCTION  Gross hematuria    Discharge Diagnoses: bladder stones, enlarged prostate, gross hematuria, urinary retention    Admission Condition: good    Discharged Condition: good    Indication for Admission: gross hematuria post op    Hospital Course:  needed CBI s/p TURP with cystolitholapaxy for bladder stones, clamped by POD 2 for dc home with bender    Consults: none    Significant Diagnostic Studies: pathology pending    Treatments: surgery, CBI    Discharge Exam:  BP (!) 162/65   Pulse 73   Temp 98.3 °F (36.8 °C) (Oral)   Resp 18   Ht 5' 7\" (1.702 m)   Wt 164 lb 12.8 oz (74.8 kg)   SpO2 97%   BMI 25.81 kg/m²   CBI clamped earlier in the day, urine remained clear      Disposition: Home or Self Care    Patient Instructions:      Discharge Medications        CONTINUE taking these medications        Instructions Prescription details   amitriptyline 10 MG Tabs  Commonly known as: Elavil      Take 1 tablet (10 mg total) by mouth every evening.   Quantity: 90 tablet  Refills: 3     finasteride 5 MG Tabs  Commonly known as: Proscar      Take 1 tablet (5 mg total) by mouth daily.   Refills: 0     metFORMIN 500 MG Tabs  Commonly known as: Glucophage      Take 2 tablets (1,000 mg total) by mouth daily with breakfast.   Refills: 0     Metoprolol Tartrate 75 MG Tabs      Take 75 mg by mouth 2 (two) times daily.   Refills: 0     multivitamin Tabs      Take 1 tablet by mouth daily.   Refills: 0     phenazopyridine 100 MG Tabs  Commonly known as: Pyridium      Take 1  tablet (100 mg total) by mouth 3 (three) times daily as needed for Pain.   Stop taking on: March 1, 2025  Quantity: 15 tablet  Refills: 0     valsartan 320 MG Tabs  Commonly known as: Diovan      Take 1 tablet (320 mg total) by mouth daily.   Quantity: 90 tablet  Refills: 3     VITAMIN D3 OR      Take 1 tablet by mouth daily.   Refills: 0            STOP taking these medications      apixaban 5 MG Tabs  Commonly known as: Eliquis        aspirin 81 MG Tabs                  Where to Get Your Medications        These medications were sent to [facility] [id number] [address] [facility] [phone], [phone]  [address] [address]      Phone: [phone]   phenazopyridine 100 MG Tabs       Activity: no heavy lifting for 3 weeks  Diet: regular diet  Wound Care: bender care    Follow-up with Dr. Palencia's in 1 week.    Signed:  Jose Palencia MD  2/24/2025  6:46 PM

## 2025-02-27 ENCOUNTER — HOSPITAL ENCOUNTER (EMERGENCY)
Facility: HOSPITAL | Age: 85
Discharge: HOME | End: 2025-02-27
Attending: EMERGENCY MEDICINE
Payer: MEDICARE

## 2025-02-27 ENCOUNTER — TELEPHONE (OUTPATIENT)
Dept: CASE MANAGEMENT | Facility: HOSPITAL | Age: 85
End: 2025-02-27

## 2025-02-27 VITALS
SYSTOLIC BLOOD PRESSURE: 154 MMHG | RESPIRATION RATE: 20 BRPM | BODY MASS INDEX: 25.9 KG/M2 | DIASTOLIC BLOOD PRESSURE: 76 MMHG | OXYGEN SATURATION: 96 % | WEIGHT: 165 LBS | HEIGHT: 67 IN | TEMPERATURE: 98 F | HEART RATE: 74 BPM

## 2025-02-27 DIAGNOSIS — R33.9 URINARY RETENTION: Primary | ICD-10-CM

## 2025-02-27 LAB
RBC #/AREA URNS AUTO: >10 /HPF
SP GR UR REFRACTOMETRY: 1.01 (ref 1–1.03)

## 2025-02-27 PROCEDURE — 99284 EMERGENCY DEPT VISIT MOD MDM: CPT

## 2025-02-27 PROCEDURE — 87086 URINE CULTURE/COLONY COUNT: CPT | Performed by: EMERGENCY MEDICINE

## 2025-02-27 PROCEDURE — 51702 INSERT TEMP BLADDER CATH: CPT

## 2025-02-27 PROCEDURE — 87077 CULTURE AEROBIC IDENTIFY: CPT | Performed by: EMERGENCY MEDICINE

## 2025-02-27 PROCEDURE — 51798 US URINE CAPACITY MEASURE: CPT

## 2025-02-27 PROCEDURE — 81001 URINALYSIS AUTO W/SCOPE: CPT | Performed by: EMERGENCY MEDICINE

## 2025-02-27 RX ORDER — SULFAMETHOXAZOLE AND TRIMETHOPRIM 800; 160 MG/1; MG/1
1 TABLET ORAL 2 TIMES DAILY
Qty: 14 TABLET | Refills: 0 | Status: SHIPPED | OUTPATIENT
Start: 2025-02-27 | End: 2025-03-06

## 2025-02-27 RX ORDER — LIDOCAINE HYDROCHLORIDE 20 MG/ML
10 JELLY TOPICAL ONCE
Status: COMPLETED | OUTPATIENT
Start: 2025-02-27 | End: 2025-02-27

## 2025-02-27 RX ORDER — LIDOCAINE HYDROCHLORIDE 20 MG/ML
JELLY TOPICAL
Status: COMPLETED
Start: 2025-02-27 | End: 2025-02-27

## 2025-02-27 NOTE — CM/SW NOTE
Patient's urine results indicates infection.  Dr. Roberto ordered Bactrim.  Dr. Roberto would like patient to start taking today.  Called patient.  No answer.  Left message to call [facility] directly with any questions.

## 2025-02-27 NOTE — DISCHARGE INSTRUCTIONS
Morocho catheter will remain in place and to follow-up in the office with  On next week.  They will call you for an appointment  Return for any problems

## 2025-02-27 NOTE — ED INITIAL ASSESSMENT (HPI)
Patient had surgery on the 20th for bladder stones and had bender in place, bender catheter was removed yesterday and patient has been unable to urinate since 3pm yesterday. Reports abd pain.

## 2025-02-27 NOTE — ED PROVIDER NOTES
Patient Seen in: [facility] Emergency Department      History     Chief Complaint   Patient presents with    Urinary Retention     Stated Complaint: Difficulty urinating, bender removed yesterday    Subjective:   HPI      This is an 84-year-old male presents with urinary retention with past medical history A-fib not anticoagulated, hypertension, hiatal hernia depression, diabetes, GERD PE, who presents with urinary retention.  Patient had recent surgery by Dr. Palencia on 2/20/2025 for bladder stone/urinary retention/BPH with urinary obstruction patient underwent cystoscopy, laser, TURP., Bender catheter was removed yesterday in the office.  He states he only had some drips since then.  He has been unable to empty his bladder.  He denies any fevers or chills.  No nausea or vomiting.  Bladder fullness.  He presents here for further evaluation.    Objective:     Past Medical History:    Actinic keratoses    Arrhythmia    AFib    Bladder stones    Colon polyp    DEPRESSION    Depression    Diabetes (HCC)    Enlarged prostate with urinary retention    Esophageal reflux    Bender catheter in place    Gastritis    Gastro-esophageal reflux disease with esophagitis    Glucose intolerance    Hearing impaired person, bilateral    Hiatal hernia    High blood pressure    HYPERTRIGLYCERIDEMIA    Incontinence    Has a urinary cathether, can be incontinent of stool with urinary urgency    LAE (left atrial enlargement)    LAE (left atrial enlargement)    Left ventricular diastolic dysfunction with preserved systolic function    LVH (left ventricular hypertrophy)    Mild ascending aorta dilation    Osteoarthrosis, unspecified whether generalized or localized, unspecified site    Other and unspecified hyperlipidemia    Pancreas cancer (HCC)    Pancreas cyst (HCC)    bx    Pulmonary embolism (HCC)    Many years ago    RENAL DISEASE    RLS (restless legs syndrome)    Synovial cyst of lumbar facet joint    Unspecified essential  hypertension    Vasovagal syncope    Visual impairment    glasses              Past Surgical History:   Procedure Laterality Date    Colonoscopy  2010    POLYP/5YRS, Dr Garcia    Egd and esd - internal  2017    Esophageal bx benign, pancreatic cyst aspirartion.  Dr Holland    Remove bladder stone,>2.5cm  2025    with TURP    Tonsillectomy      Transurethral elec-surg prostatectom  2025    with bladder stones laser litho    Upper gi endoscopy - referral  2010    Upper gi endoscopy,exam  02/10/2025                Social History     Socioeconomic History    Marital status:    Tobacco Use    Smoking status: Former     Current packs/day: 0.00     Types: Cigarettes     Quit date: 1958     Years since quittin.2    Smokeless tobacco: Never   Vaping Use    Vaping status: Never Used   Substance and Sexual Activity    Alcohol use: Not Currently     Comment: 2-3/MONTH    Drug use: No    Sexual activity: Yes     Comment:      Social Drivers of Health     Food Insecurity: No Food Insecurity (2025)    NCSS - Food Insecurity     Worried About Running Out of Food in the Last Year: No     Ran Out of Food in the Last Year: No   Transportation Needs: No Transportation Needs (2025)    NCSS - Transportation     Lack of Transportation: No   Housing Stability: Not At Risk (2025)    NCSS - Housing/Utilities     Has Housing: Yes     Worried About Losing Housing: No     Unable to Get Utilities: No                  Physical Exam     ED Triage Vitals [25 0511]   BP (!) 185/100   Pulse 97   Resp 20   Temp 97.8 °F (36.6 °C)   Temp src    SpO2 96 %   O2 Device None (Room air)       Current Vitals:   Vital Signs  BP: 154/76  Pulse: 74  Resp: 20  Temp: 97.8 °F (36.6 °C)  MAP (mmHg): 99    Oxygen Therapy  SpO2: 96 %  O2 Device: None (Room air)        Physical Exam  GENERAL: Awake, alert oriented x3, nontoxic appearing.   SKIN: Normal, warm, and dry.  HEENT:  Pupils equally round  and reactive to light. Conjuctiva clear.  Oropharynx is clear and moist.   Lungs: Clear to auscultation bilaterally with no rales, no retractions, and no wheezing.  HEART:  Regular rate and rhythm. S1 and S2. No murmurs, no rubs or gallops.   ABDOMEN: Soft, nontender and nondistended. Normoactive bowel sounds. No rebound. No guarding.   EXTREMITIES: Warm with brisk capillary refill.         ED Course     Labs Reviewed   URINALYSIS WITH CULTURE REFLEX - Abnormal; Notable for the following components:       Result Value    Urine Color Light-Orange (*)     Clarity Urine Turbid (*)     WBC Urine 21-50 (*)     RBC Urine >10 (*)     Bacteria Urine 1+ (*)     All other components within normal limits   SPECIFIC GRAVITY, URINE - Normal   URINE CULTURE, ROUTINE                   MDM      This is an 84-year-old male presents with urinary retention with past medical history A-fib not anticoagulated, hypertension, hiatal hernia depression, diabetes, GERD PE, who presents with urinary retention.  Patient had recent surgery by Dr. Palencia on 2/20/2025 for bladder stone/urinary retention/BPH with urinary obstruction patient underwent cystoscopy, laser, TURP., Morocho catheter was removed yesterday in the office.  Differential includes UTI/postop urinary retention.    Morocho catheter was inserted prior to my arrival.  There was 800 mL of dark brown urine in the bag.    Urinalysis: Not completed.  WBC 21-50.  RBC greater than 10.  Bacteria 1+.    I contacted  and discussed results.  Culture is pending.  Would start him on Bactrim pending full urinalysis and culture results.  She will contact family and let them know.    Case discussed with Dr. Palencia will discharge home with Morocho catheter.  They will contact him for follow-up next week to do another trial.  This was communicated to patient.  Expresses understanding with plan of care.    Disposition and Plan     Clinical Impression:  1. Urinary retention          Disposition:  Discharge  2/27/2025  7:57 am    Follow-up:  Jose Palencia MD  [address] [facility]  [address] [place] [id number]  [phone]    Follow up in 1 week(s)            Medications Prescribed:  Discharge Medication List as of 2/27/2025  8:05 AM              Supplementary Documentation:

## 2025-03-01 LAB
URIC ACID-STONE: 100 %
WEIGHT-STONE: 2469 MG

## 2025-03-08 ENCOUNTER — HOSPITAL ENCOUNTER (EMERGENCY)
Age: 85
Discharge: HOME | End: 2025-03-08
Attending: EMERGENCY MEDICINE
Payer: MEDICARE

## 2025-03-08 VITALS
TEMPERATURE: 100 F | BODY MASS INDEX: 25.43 KG/M2 | HEIGHT: 67 IN | DIASTOLIC BLOOD PRESSURE: 82 MMHG | RESPIRATION RATE: 20 BRPM | WEIGHT: 162 LBS | SYSTOLIC BLOOD PRESSURE: 151 MMHG | HEART RATE: 112 BPM | OXYGEN SATURATION: 99 %

## 2025-03-08 DIAGNOSIS — R31.9 URINARY TRACT INFECTION WITH HEMATURIA, SITE UNSPECIFIED: Primary | ICD-10-CM

## 2025-03-08 DIAGNOSIS — N39.0 URINARY TRACT INFECTION WITH HEMATURIA, SITE UNSPECIFIED: Primary | ICD-10-CM

## 2025-03-08 DIAGNOSIS — R33.9 URINARY RETENTION: ICD-10-CM

## 2025-03-08 LAB
ALBUMIN SERPL-MCNC: 4 G/DL (ref 3.2–4.8)
ALBUMIN/GLOB SERPL: 1.5 {RATIO} (ref 1–2)
ALP LIVER SERPL-CCNC: 59 U/L
ALT SERPL-CCNC: 9 U/L
ANION GAP SERPL CALC-SCNC: 8 MMOL/L (ref 0–18)
AST SERPL-CCNC: 23 U/L (ref ?–34)
BASOPHILS # BLD AUTO: 0.03 X10(3) UL (ref 0–0.2)
BASOPHILS NFR BLD AUTO: 0.4 %
BILIRUB SERPL-MCNC: 0.8 MG/DL (ref 0.2–1.1)
BILIRUB UR QL STRIP.AUTO: NEGATIVE
BUN BLD-MCNC: 12 MG/DL (ref 9–23)
CALCIUM BLD-MCNC: 9.7 MG/DL (ref 8.7–10.6)
CHLORIDE SERPL-SCNC: 104 MMOL/L (ref 98–112)
CO2 SERPL-SCNC: 26 MMOL/L (ref 21–32)
CREAT BLD-MCNC: 1.06 MG/DL
EGFRCR SERPLBLD CKD-EPI 2021: 69 ML/MIN/1.73M2 (ref 60–?)
EOSINOPHIL # BLD AUTO: 0.01 X10(3) UL (ref 0–0.7)
EOSINOPHIL NFR BLD AUTO: 0.1 %
ERYTHROCYTE [DISTWIDTH] IN BLOOD BY AUTOMATED COUNT: 13.2 %
GLOBULIN PLAS-MCNC: 2.7 G/DL (ref 2–3.5)
GLUCOSE BLD-MCNC: 241 MG/DL (ref 70–99)
GLUCOSE UR STRIP.AUTO-MCNC: 250 MG/DL
HCT VFR BLD AUTO: 28.9 %
HGB BLD-MCNC: 10 G/DL
IMM GRANULOCYTES # BLD AUTO: 0.03 X10(3) UL (ref 0–1)
IMM GRANULOCYTES NFR BLD: 0.4 %
KETONES UR STRIP.AUTO-MCNC: 40 MG/DL
LYMPHOCYTES # BLD AUTO: 0.52 X10(3) UL (ref 1–4)
LYMPHOCYTES NFR BLD AUTO: 6.7 %
MCH RBC QN AUTO: 29.2 PG (ref 26–34)
MCHC RBC AUTO-ENTMCNC: 34.6 G/DL (ref 31–37)
MCV RBC AUTO: 84.3 FL
MONOCYTES # BLD AUTO: 0.28 X10(3) UL (ref 0.1–1)
MONOCYTES NFR BLD AUTO: 3.6 %
NEUTROPHILS # BLD AUTO: 6.9 X10 (3) UL (ref 1.5–7.7)
NEUTROPHILS # BLD AUTO: 6.9 X10(3) UL (ref 1.5–7.7)
NEUTROPHILS NFR BLD AUTO: 88.8 %
NITRITE UR QL STRIP.AUTO: POSITIVE
OSMOLALITY SERPL CALC.SUM OF ELEC: 294 MOSM/KG (ref 275–295)
PH UR STRIP.AUTO: 5 [PH] (ref 5–8)
PLATELET # BLD AUTO: 136 10(3)UL (ref 150–450)
POTASSIUM SERPL-SCNC: 4.3 MMOL/L (ref 3.5–5.1)
PROT SERPL-MCNC: 6.7 G/DL (ref 5.7–8.2)
RBC # BLD AUTO: 3.43 X10(6)UL
RBC #/AREA URNS AUTO: >10 /HPF
SODIUM SERPL-SCNC: 138 MMOL/L (ref 136–145)
SP GR UR STRIP.AUTO: 1.02 (ref 1–1.03)
UROBILINOGEN UR STRIP.AUTO-MCNC: 0.2 MG/DL
WBC # BLD AUTO: 7.8 X10(3) UL (ref 4–11)

## 2025-03-08 PROCEDURE — 87086 URINE CULTURE/COLONY COUNT: CPT | Performed by: EMERGENCY MEDICINE

## 2025-03-08 PROCEDURE — 80053 COMPREHEN METABOLIC PANEL: CPT | Performed by: EMERGENCY MEDICINE

## 2025-03-08 PROCEDURE — 36415 COLL VENOUS BLD VENIPUNCTURE: CPT

## 2025-03-08 PROCEDURE — 99284 EMERGENCY DEPT VISIT MOD MDM: CPT

## 2025-03-08 PROCEDURE — 87106 FUNGI IDENTIFICATION YEAST: CPT | Performed by: EMERGENCY MEDICINE

## 2025-03-08 PROCEDURE — 85025 COMPLETE CBC W/AUTO DIFF WBC: CPT | Performed by: EMERGENCY MEDICINE

## 2025-03-08 PROCEDURE — 51702 INSERT TEMP BLADDER CATH: CPT

## 2025-03-08 PROCEDURE — 81015 MICROSCOPIC EXAM OF URINE: CPT | Performed by: EMERGENCY MEDICINE

## 2025-03-08 PROCEDURE — 99283 EMERGENCY DEPT VISIT LOW MDM: CPT

## 2025-03-08 PROCEDURE — 81001 URINALYSIS AUTO W/SCOPE: CPT | Performed by: EMERGENCY MEDICINE

## 2025-03-08 RX ORDER — CEFPODOXIME PROXETIL 200 MG/1
400 TABLET, FILM COATED ORAL 2 TIMES DAILY
Qty: 40 TABLET | Refills: 0 | Status: SHIPPED | OUTPATIENT
Start: 2025-03-08 | End: 2025-03-18

## 2025-03-08 NOTE — ED PROVIDER NOTES
Patient Seen in: [facility] In [place]      History     Chief Complaint   Patient presents with    Urinary Symptoms     Stated Complaint: urinary retention, s/p cath removal yesterday    Subjective:   HPI      84-year-old gentleman here for evaluation of urinary retention, Morocho catheter was removed yesterday this was placed secondary to BPH.  Patient is awaiting treatment started for pancreatic cancer.  Denies any fevers any hematuria any other complaints or concerns.  Was able to void yesterday without difficulty, symptoms worsened overnight.    Objective:     Past Medical History:    Actinic keratoses    Arrhythmia    AFib    Bladder stones    Colon polyp    DEPRESSION    Depression    Diabetes (HCC)    Enlarged prostate with urinary retention    Esophageal reflux    Morocho catheter in place    Gastritis    Gastro-esophageal reflux disease with esophagitis    Glucose intolerance    Hearing impaired person, bilateral    Hiatal hernia    High blood pressure    HYPERTRIGLYCERIDEMIA    Incontinence    Has a urinary cathether, can be incontinent of stool with urinary urgency    LAE (left atrial enlargement)    LAE (left atrial enlargement)    Left ventricular diastolic dysfunction with preserved systolic function    LVH (left ventricular hypertrophy)    Mild ascending aorta dilation    Osteoarthrosis, unspecified whether generalized or localized, unspecified site    Other and unspecified hyperlipidemia    Pancreas cancer (HCC)    Pancreas cyst (HCC)    bx    Pulmonary embolism (HCC)    Many years ago    RENAL DISEASE    RLS (restless legs syndrome)    Synovial cyst of lumbar facet joint    Unspecified essential hypertension    Vasovagal syncope    Visual impairment    glasses              Past Surgical History:   Procedure Laterality Date    Colonoscopy  06/22/2010    POLYP/5YRS, Dr Garcia    Egd and esd - internal  12/11/2017    Esophageal bx benign, pancreatic cyst aspirartion.  Dr Holland     Remove bladder stone,>2.5cm  2025    with TURP    Tonsillectomy      Transurethral elec-surg prostatectom  2025    with bladder stones laser litho    Upper gi endoscopy - referral  2010    Upper gi endoscopy,exam  02/10/2025                Social History     Socioeconomic History    Marital status:    Tobacco Use    Smoking status: Former     Current packs/day: 0.00     Types: Cigarettes     Quit date: 1958     Years since quittin.2    Smokeless tobacco: Never   Vaping Use    Vaping status: Never Used   Substance and Sexual Activity    Alcohol use: Not Currently     Comment: 2-3/MONTH    Drug use: No    Sexual activity: Yes     Comment:      Social Drivers of Health     Food Insecurity: No Food Insecurity (2025)    NCSS - Food Insecurity     Worried About Running Out of Food in the Last Year: No     Ran Out of Food in the Last Year: No   Transportation Needs: No Transportation Needs (2025)    NCSS - Transportation     Lack of Transportation: No   Housing Stability: Not At Risk (2025)    NCSS - Housing/Utilities     Has Housing: Yes     Worried About Losing Housing: No     Unable to Get Utilities: No                  Physical Exam     ED Triage Vitals [25 1014]   BP (!) 188/101   Pulse (!) 134   Resp 20   Temp 98 °F (36.7 °C)   Temp src Temporal   SpO2 99 %   O2 Device None (Room air)       Current Vitals:   Vital Signs  BP: 151/82  Pulse: 112  Resp: 20  Temp: 99.5 °F (37.5 °C)  Temp src: Temporal    Oxygen Therapy  SpO2: 99 %  O2 Device: None (Room air)        Physical Exam      Physical Exam  Vitals signs and nursing note reviewed.   General:  Patient laying supine in the bed in no acute distress  Head: Normocephalic and atraumatic.   HEENT:  Mucous membranes are moist.   Cardiovascular:  Normal rate and regular rhythm.  No Edema  Pulmonary:  Pulmonary effort is normal.  Normal breath sounds. No wheezing, rhonchi or rales.   Abdominal: Soft nontender  nondistended, normal bowel sounds, no guarding no rebound tenderness  Skin: Warm and dry  Neurological: Awake alert, speech is normal        ED Course     Labs Reviewed   URINALYSIS WITH CULTURE REFLEX - Abnormal; Notable for the following components:       Result Value    Urine Color Brown (*)     Clarity Urine Cloudy (*)     Glucose Urine 250 (*)     Ketones Urine 40 (*)     Blood Urine Large (*)     Protein Urine 100 mg/dL (*)     Nitrite Urine Positive (*)     Leukocyte Esterase Urine Trace (*)     All other components within normal limits   UA MICROSCOPIC ONLY, URINE - Abnormal; Notable for the following components:    WBC Urine 21-50 (*)     RBC Urine >10 (*)     Bacteria Urine 3+ (*)     Squamous Epi. Cells Few (*)     All other components within normal limits   COMP METABOLIC PANEL (14) - Abnormal; Notable for the following components:    Glucose 241 (*)     ALT 9 (*)     All other components within normal limits   CBC WITH DIFFERENTIAL WITH PLATELET - Abnormal; Notable for the following components:    RBC 3.43 (*)     HGB 10.0 (*)     HCT 28.9 (*)     .0 (*)     Lymphocyte Absolute 0.52 (*)     All other components within normal limits   URINE CULTURE, ROUTINE                 MDM      84-year-old gentleman here for evaluation of urinary retention.  Differential includes UTI cystitis urinary retention.  Morocho catheter was replaced here, with over 500 cc retained urine.  Basic labs were sent as patient currently being treated with Bactrim, urine is somewhat dark brownish in color.  It is nitrate positive, kidney function is normal, advised patient to stop the Bactrim, start cefpodoxime which I prescribed will follow-up with his urologist for further evaluation return precautions discussed she is in agreement with plan        Medical Decision Making      Disposition and Plan     Clinical Impression:  1. Urinary tract infection with hematuria, site unspecified    2. Urinary retention          Disposition:  Discharge  3/8/2025 12:38 pm    Follow-up:  Jose Palencia MD  [address]  [facility] [id number] [phone]    Follow up  Follow-up with your urologist for reevaluation in 24 to 48 hours.  Return to ER if symptoms worsen or change or if any other new concerns.    Stop Bactrim start cefpodoxime          Medications Prescribed:  Discharge Medication List as of 3/8/2025 12:41 PM        START taking these medications    Details   cefpodoxime 200 MG Oral Tab Take 2 tablets (400 mg total) by mouth 2 (two) times daily for 10 days., Normal, Disp-40 tablet, R-0                 Supplementary Documentation:

## 2025-04-17 ENCOUNTER — APPOINTMENT (OUTPATIENT)
Dept: CT IMAGING | Facility: HOSPITAL | Age: 85
End: 2025-04-17
Attending: EMERGENCY MEDICINE
Payer: MEDICARE

## 2025-04-17 ENCOUNTER — HOSPITAL ENCOUNTER (INPATIENT)
Facility: HOSPITAL | Age: 85
LOS: 5 days | Discharge: HOME HEALTH | End: 2025-04-23
Attending: EMERGENCY MEDICINE | Admitting: INTERNAL MEDICINE
Payer: MEDICARE

## 2025-04-17 ENCOUNTER — APPOINTMENT (OUTPATIENT)
Dept: GENERAL RADIOLOGY | Facility: HOSPITAL | Age: 85
End: 2025-04-17
Attending: EMERGENCY MEDICINE
Payer: MEDICARE

## 2025-04-17 DIAGNOSIS — I48.91 ATRIAL FIBRILLATION WITH RAPID VENTRICULAR RESPONSE (HCC): Primary | ICD-10-CM

## 2025-04-17 DIAGNOSIS — R19.7 DIARRHEA, UNSPECIFIED TYPE: ICD-10-CM

## 2025-04-17 DIAGNOSIS — C25.9 MALIGNANT NEOPLASM OF PANCREAS, UNSPECIFIED LOCATION OF MALIGNANCY (HCC): ICD-10-CM

## 2025-04-17 LAB
ALBUMIN SERPL-MCNC: 3.7 G/DL (ref 3.2–4.8)
ALBUMIN/GLOB SERPL: 1.8 {RATIO} (ref 1–2)
ALP LIVER SERPL-CCNC: 35 U/L (ref 45–117)
ALT SERPL-CCNC: 10 U/L (ref 10–49)
ANION GAP SERPL CALC-SCNC: 11 MMOL/L (ref 0–18)
ANTIBODY SCREEN: NEGATIVE
AST SERPL-CCNC: 17 U/L (ref ?–34)
BASOPHILS # BLD AUTO: 0.01 X10(3) UL (ref 0–0.2)
BASOPHILS NFR BLD AUTO: 0.1 %
BILIRUB SERPL-MCNC: 0.5 MG/DL (ref 0.2–1.1)
BILIRUB UR QL STRIP.AUTO: NEGATIVE
BUN BLD-MCNC: 41 MG/DL (ref 9–23)
CALCIUM BLD-MCNC: 8.9 MG/DL (ref 8.7–10.6)
CHLORIDE SERPL-SCNC: 104 MMOL/L (ref 98–112)
CHOLEST SERPL-MCNC: 96 MG/DL (ref ?–200)
CLARITY UR REFRACT.AUTO: CLEAR
CO2 SERPL-SCNC: 24 MMOL/L (ref 21–32)
COLOR UR AUTO: COLORLESS
CREAT BLD-MCNC: 1.07 MG/DL (ref 0.7–1.3)
EGFRCR SERPLBLD CKD-EPI 2021: 68 ML/MIN/1.73M2 (ref 60–?)
EOSINOPHIL # BLD AUTO: 0.01 X10(3) UL (ref 0–0.7)
EOSINOPHIL NFR BLD AUTO: 0.1 %
ERYTHROCYTE [DISTWIDTH] IN BLOOD BY AUTOMATED COUNT: 16.7 %
GLOBULIN PLAS-MCNC: 2.1 G/DL (ref 2–3.5)
GLUCOSE BLD-MCNC: 359 MG/DL (ref 70–99)
GLUCOSE BLD-MCNC: 406 MG/DL (ref 70–99)
GLUCOSE UR STRIP.AUTO-MCNC: >1000 MG/DL
HCT VFR BLD AUTO: 17 % (ref 39–53)
HDLC SERPL-MCNC: 30 MG/DL (ref 40–59)
HGB BLD-MCNC: 5.6 G/DL (ref 13–17.5)
IMM GRANULOCYTES # BLD AUTO: 0.06 X10(3) UL (ref 0–1)
IMM GRANULOCYTES NFR BLD: 0.9 %
KETONES UR STRIP.AUTO-MCNC: 10 MG/DL
LDLC SERPL CALC-MCNC: 34 MG/DL (ref ?–100)
LEUKOCYTE ESTERASE UR QL STRIP.AUTO: 75
LYMPHOCYTES # BLD AUTO: 0.72 X10(3) UL (ref 1–4)
LYMPHOCYTES NFR BLD AUTO: 10.6 %
MCH RBC QN AUTO: 28.7 PG (ref 26–34)
MCHC RBC AUTO-ENTMCNC: 32.9 G/DL (ref 31–37)
MCV RBC AUTO: 87.2 FL (ref 80–100)
MONOCYTES # BLD AUTO: 0.08 X10(3) UL (ref 0.1–1)
MONOCYTES NFR BLD AUTO: 1.2 %
NEUTROPHILS # BLD AUTO: 5.94 X10 (3) UL (ref 1.5–7.7)
NEUTROPHILS # BLD AUTO: 5.94 X10(3) UL (ref 1.5–7.7)
NEUTROPHILS NFR BLD AUTO: 87.1 %
NITRITE UR QL STRIP.AUTO: NEGATIVE
NONHDLC SERPL-MCNC: 66 MG/DL (ref ?–130)
OSMOLALITY SERPL CALC.SUM OF ELEC: 313 MOSM/KG (ref 275–295)
PH UR STRIP.AUTO: 5.5 [PH] (ref 5–8)
PLATELET # BLD AUTO: 116 10(3)UL (ref 150–450)
POTASSIUM SERPL-SCNC: 3.3 MMOL/L (ref 3.5–5.1)
PROT SERPL-MCNC: 5.8 G/DL (ref 5.7–8.2)
PROT UR STRIP.AUTO-MCNC: NEGATIVE MG/DL
RBC # BLD AUTO: 1.95 X10(6)UL (ref 3.8–5.8)
RH BLOOD TYPE: POSITIVE
RH BLOOD TYPE: POSITIVE
SODIUM SERPL-SCNC: 139 MMOL/L (ref 136–145)
SP GR UR STRIP.AUTO: 1.02 (ref 1–1.03)
TRIGL SERPL-MCNC: 197 MG/DL (ref 30–149)
TROPONIN I SERPL HS-MCNC: 121 NG/L (ref ?–53)
UROBILINOGEN UR STRIP.AUTO-MCNC: NORMAL MG/DL
VLDLC SERPL CALC-MCNC: 27 MG/DL (ref 0–30)
WBC # BLD AUTO: 6.8 X10(3) UL (ref 4–11)

## 2025-04-17 PROCEDURE — 93010 ELECTROCARDIOGRAM REPORT: CPT

## 2025-04-17 PROCEDURE — 86900 BLOOD TYPING SEROLOGIC ABO: CPT | Performed by: EMERGENCY MEDICINE

## 2025-04-17 PROCEDURE — 86901 BLOOD TYPING SEROLOGIC RH(D): CPT | Performed by: EMERGENCY MEDICINE

## 2025-04-17 PROCEDURE — 80053 COMPREHEN METABOLIC PANEL: CPT

## 2025-04-17 PROCEDURE — 84484 ASSAY OF TROPONIN QUANT: CPT | Performed by: EMERGENCY MEDICINE

## 2025-04-17 PROCEDURE — 87086 URINE CULTURE/COLONY COUNT: CPT | Performed by: EMERGENCY MEDICINE

## 2025-04-17 PROCEDURE — 99291 CRITICAL CARE FIRST HOUR: CPT

## 2025-04-17 PROCEDURE — 84484 ASSAY OF TROPONIN QUANT: CPT

## 2025-04-17 PROCEDURE — 96366 THER/PROPH/DIAG IV INF ADDON: CPT

## 2025-04-17 PROCEDURE — 71275 CT ANGIOGRAPHY CHEST: CPT | Performed by: EMERGENCY MEDICINE

## 2025-04-17 PROCEDURE — 82962 GLUCOSE BLOOD TEST: CPT

## 2025-04-17 PROCEDURE — 80061 LIPID PANEL: CPT | Performed by: EMERGENCY MEDICINE

## 2025-04-17 PROCEDURE — 71045 X-RAY EXAM CHEST 1 VIEW: CPT | Performed by: EMERGENCY MEDICINE

## 2025-04-17 PROCEDURE — 96375 TX/PRO/DX INJ NEW DRUG ADDON: CPT

## 2025-04-17 PROCEDURE — 99292 CRITICAL CARE ADDL 30 MIN: CPT

## 2025-04-17 PROCEDURE — 96376 TX/PRO/DX INJ SAME DRUG ADON: CPT

## 2025-04-17 PROCEDURE — 85025 COMPLETE CBC W/AUTO DIFF WBC: CPT | Performed by: EMERGENCY MEDICINE

## 2025-04-17 PROCEDURE — 86920 COMPATIBILITY TEST SPIN: CPT

## 2025-04-17 PROCEDURE — 96368 THER/DIAG CONCURRENT INF: CPT

## 2025-04-17 PROCEDURE — 83036 HEMOGLOBIN GLYCOSYLATED A1C: CPT | Performed by: STUDENT IN AN ORGANIZED HEALTH CARE EDUCATION/TRAINING PROGRAM

## 2025-04-17 PROCEDURE — 80053 COMPREHEN METABOLIC PANEL: CPT | Performed by: EMERGENCY MEDICINE

## 2025-04-17 PROCEDURE — 86850 RBC ANTIBODY SCREEN: CPT | Performed by: EMERGENCY MEDICINE

## 2025-04-17 PROCEDURE — 93005 ELECTROCARDIOGRAM TRACING: CPT

## 2025-04-17 PROCEDURE — 81001 URINALYSIS AUTO W/SCOPE: CPT | Performed by: EMERGENCY MEDICINE

## 2025-04-17 PROCEDURE — 85025 COMPLETE CBC W/AUTO DIFF WBC: CPT

## 2025-04-17 RX ORDER — METOPROLOL TARTRATE 1 MG/ML
5 INJECTION, SOLUTION INTRAVENOUS ONCE
Status: COMPLETED | OUTPATIENT
Start: 2025-04-17 | End: 2025-04-17

## 2025-04-17 RX ORDER — DIGOXIN 0.25 MG/ML
250 INJECTION INTRAMUSCULAR; INTRAVENOUS ONCE
Status: COMPLETED | OUTPATIENT
Start: 2025-04-17 | End: 2025-04-18

## 2025-04-18 ENCOUNTER — ANESTHESIA EVENT (OUTPATIENT)
Dept: ENDOSCOPY | Facility: HOSPITAL | Age: 85
End: 2025-04-18
Payer: MEDICARE

## 2025-04-18 ENCOUNTER — ANESTHESIA (OUTPATIENT)
Dept: ENDOSCOPY | Facility: HOSPITAL | Age: 85
End: 2025-04-18
Payer: MEDICARE

## 2025-04-18 PROBLEM — C25.9 MALIGNANT NEOPLASM OF PANCREAS, UNSPECIFIED LOCATION OF MALIGNANCY (HCC): Status: ACTIVE | Noted: 2025-04-18

## 2025-04-18 PROBLEM — R19.7 DIARRHEA, UNSPECIFIED TYPE: Status: ACTIVE | Noted: 2025-04-18

## 2025-04-18 LAB
ADENOVIRUS F 40/41 PCR: NEGATIVE
ALBUMIN SERPL-MCNC: 3.3 G/DL (ref 3.2–4.8)
ALBUMIN/GLOB SERPL: 1.7 {RATIO} (ref 1–2)
ALP LIVER SERPL-CCNC: 32 U/L (ref 45–117)
ALT SERPL-CCNC: 11 U/L (ref 10–49)
ANION GAP SERPL CALC-SCNC: 12 MMOL/L (ref 0–18)
AST SERPL-CCNC: 33 U/L (ref ?–34)
ASTROVIRUS PCR: NEGATIVE
ATRIAL RATE: 91 BPM
BASOPHILS # BLD AUTO: 0.01 X10(3) UL (ref 0–0.2)
BASOPHILS NFR BLD AUTO: 0.3 %
BILIRUB SERPL-MCNC: 0.4 MG/DL (ref 0.2–1.1)
BUN BLD-MCNC: 31 MG/DL (ref 9–23)
C CAYETANENSIS DNA SPEC QL NAA+PROBE: NEGATIVE
C DIFF GDH + TOXINS A+B STL QL IA.RAPID: DETECTED
C DIFF TOX B STL QL: POSITIVE
CALCIUM BLD-MCNC: 8.8 MG/DL (ref 8.7–10.6)
CAMPY SP DNA.DIARRHEA STL QL NAA+PROBE: NEGATIVE
CHLORIDE SERPL-SCNC: 109 MMOL/L (ref 98–112)
CO2 SERPL-SCNC: 23 MMOL/L (ref 21–32)
CREAT BLD-MCNC: 1.02 MG/DL (ref 0.7–1.3)
CRYPTOSP DNA SPEC QL NAA+PROBE: NEGATIVE
EAEC PAA PLAS AGGR+AATA ST NAA+NON-PRB: NEGATIVE
EC STX1+STX2 + H7 FLIC SPEC NAA+PROBE: NEGATIVE
EGFRCR SERPLBLD CKD-EPI 2021: 72 ML/MIN/1.73M2 (ref 60–?)
ENTAMOEBA HISTOLYTICA PCR: NEGATIVE
EOSINOPHIL # BLD AUTO: 0 X10(3) UL (ref 0–0.7)
EOSINOPHIL NFR BLD AUTO: 0 %
EPEC EAE GENE STL QL NAA+NON-PROBE: NEGATIVE
ERYTHROCYTE [DISTWIDTH] IN BLOOD BY AUTOMATED COUNT: 16 %
EST. AVERAGE GLUCOSE BLD GHB EST-MCNC: 151 MG/DL (ref 68–126)
ETEC LTA+ST1A+ST1B TOX ST NAA+NON-PROBE: NEGATIVE
GIARDIA LAMBLIA PCR: NEGATIVE
GLOBULIN PLAS-MCNC: 2 G/DL (ref 2–3.5)
GLUCOSE BLD-MCNC: 188 MG/DL (ref 70–99)
GLUCOSE BLD-MCNC: 219 MG/DL (ref 70–99)
GLUCOSE BLD-MCNC: 267 MG/DL (ref 70–99)
GLUCOSE BLD-MCNC: 274 MG/DL (ref 70–99)
GLUCOSE BLD-MCNC: 277 MG/DL (ref 70–99)
GLUCOSE BLD-MCNC: 286 MG/DL (ref 70–99)
GLUCOSE BLD-MCNC: 286 MG/DL (ref 70–99)
GLUCOSE BLD-MCNC: 399 MG/DL (ref 70–99)
GLUCOSE BLD-MCNC: 404 MG/DL (ref 70–99)
GLUCOSE BLD-MCNC: 413 MG/DL (ref 70–99)
HBA1C MFR BLD: 6.9 % (ref ?–5.7)
HCT VFR BLD AUTO: 17.1 % (ref 39–53)
HGB BLD-MCNC: 5.6 G/DL (ref 13–17.5)
HGB BLD-MCNC: 7.2 G/DL (ref 13–17.5)
IMM GRANULOCYTES # BLD AUTO: 0.01 X10(3) UL (ref 0–1)
IMM GRANULOCYTES NFR BLD: 0.3 %
LYMPHOCYTES # BLD AUTO: 0.65 X10(3) UL (ref 1–4)
LYMPHOCYTES NFR BLD AUTO: 18.2 %
MAGNESIUM SERPL-MCNC: 1.6 MG/DL (ref 1.6–2.6)
MCH RBC QN AUTO: 29.2 PG (ref 26–34)
MCHC RBC AUTO-ENTMCNC: 32.7 G/DL (ref 31–37)
MCV RBC AUTO: 89.1 FL (ref 80–100)
MONOCYTES # BLD AUTO: 0.07 X10(3) UL (ref 0.1–1)
MONOCYTES NFR BLD AUTO: 2 %
NEUTROPHILS # BLD AUTO: 2.84 X10 (3) UL (ref 1.5–7.7)
NEUTROPHILS # BLD AUTO: 2.84 X10(3) UL (ref 1.5–7.7)
NEUTROPHILS NFR BLD AUTO: 79.2 %
NOROVIRUS GI/GII PCR: NEGATIVE
OSMOLALITY SERPL CALC.SUM OF ELEC: 314 MOSM/KG (ref 275–295)
P SHIGELLOIDES DNA STL QL NAA+PROBE: NEGATIVE
PLATELET # BLD AUTO: 86 10(3)UL (ref 150–450)
PLATELETS.RETICULATED NFR BLD AUTO: 3.5 % (ref 0–7)
POTASSIUM SERPL-SCNC: 3.6 MMOL/L (ref 3.5–5.1)
PROT SERPL-MCNC: 5.3 G/DL (ref 5.7–8.2)
Q-T INTERVAL: 296 MS
Q-T INTERVAL: 372 MS
QRS DURATION: 106 MS
QRS DURATION: 90 MS
QTC CALCULATION (BEZET): 467 MS
QTC CALCULATION (BEZET): 477 MS
R AXIS: 12 DEGREES
R AXIS: 53 DEGREES
RBC # BLD AUTO: 1.92 X10(6)UL (ref 3.8–5.8)
ROTAVIRUS A PCR: NEGATIVE
SALMONELLA DNA SPEC QL NAA+PROBE: NEGATIVE
SAPOVIRUS PCR: NEGATIVE
SHIGELLA SP+EIEC IPAH ST NAA+NON-PROBE: NEGATIVE
SODIUM SERPL-SCNC: 144 MMOL/L (ref 136–145)
T AXIS: -28 DEGREES
T AXIS: 153 DEGREES
TROPONIN I SERPL HS-MCNC: 8405 NG/L (ref ?–53)
V CHOLERAE DNA SPEC QL NAA+PROBE: NEGATIVE
VENTRICULAR RATE: 156 BPM
VENTRICULAR RATE: 95 BPM
VIBRIO DNA SPEC NAA+PROBE: NEGATIVE
WBC # BLD AUTO: 3.6 X10(3) UL (ref 4–11)
YERSINIA DNA SPEC NAA+PROBE: NEGATIVE

## 2025-04-18 PROCEDURE — 96365 THER/PROPH/DIAG IV INF INIT: CPT

## 2025-04-18 PROCEDURE — 87507 IADNA-DNA/RNA PROBE TQ 12-25: CPT | Performed by: HOSPITALIST

## 2025-04-18 PROCEDURE — 87493 C DIFF AMPLIFIED PROBE: CPT | Performed by: HOSPITALIST

## 2025-04-18 PROCEDURE — 0W3P8ZZ CONTROL BLEEDING IN GASTROINTESTINAL TRACT, VIA NATURAL OR ARTIFICIAL OPENING ENDOSCOPIC: ICD-10-PCS | Performed by: INTERNAL MEDICINE

## 2025-04-18 PROCEDURE — 82962 GLUCOSE BLOOD TEST: CPT

## 2025-04-18 PROCEDURE — 83036 HEMOGLOBIN GLYCOSYLATED A1C: CPT | Performed by: STUDENT IN AN ORGANIZED HEALTH CARE EDUCATION/TRAINING PROGRAM

## 2025-04-18 PROCEDURE — 36430 TRANSFUSION BLD/BLD COMPNT: CPT

## 2025-04-18 PROCEDURE — 85018 HEMOGLOBIN: CPT | Performed by: HOSPITALIST

## 2025-04-18 PROCEDURE — 80053 COMPREHEN METABOLIC PANEL: CPT | Performed by: STUDENT IN AN ORGANIZED HEALTH CARE EDUCATION/TRAINING PROGRAM

## 2025-04-18 PROCEDURE — 96368 THER/DIAG CONCURRENT INF: CPT

## 2025-04-18 PROCEDURE — 94760 N-INVAS EAR/PLS OXIMETRY 1: CPT

## 2025-04-18 PROCEDURE — 84484 ASSAY OF TROPONIN QUANT: CPT | Performed by: STUDENT IN AN ORGANIZED HEALTH CARE EDUCATION/TRAINING PROGRAM

## 2025-04-18 PROCEDURE — 93005 ELECTROCARDIOGRAM TRACING: CPT

## 2025-04-18 PROCEDURE — 30233N1 TRANSFUSION OF NONAUTOLOGOUS RED BLOOD CELLS INTO PERIPHERAL VEIN, PERCUTANEOUS APPROACH: ICD-10-PCS | Performed by: EMERGENCY MEDICINE

## 2025-04-18 PROCEDURE — 83735 ASSAY OF MAGNESIUM: CPT | Performed by: STUDENT IN AN ORGANIZED HEALTH CARE EDUCATION/TRAINING PROGRAM

## 2025-04-18 PROCEDURE — XW0G886 INTRODUCTION OF MINERAL-BASED TOPICAL HEMOSTATIC AGENT INTO UPPER GI, VIA NATURAL OR ARTIFICIAL OPENING ENDOSCOPIC, NEW TECHNOLOGY GROUP 6: ICD-10-PCS | Performed by: INTERNAL MEDICINE

## 2025-04-18 PROCEDURE — 96375 TX/PRO/DX INJ NEW DRUG ADDON: CPT

## 2025-04-18 PROCEDURE — 93010 ELECTROCARDIOGRAM REPORT: CPT | Performed by: INTERNAL MEDICINE

## 2025-04-18 PROCEDURE — 85025 COMPLETE CBC W/AUTO DIFF WBC: CPT | Performed by: STUDENT IN AN ORGANIZED HEALTH CARE EDUCATION/TRAINING PROGRAM

## 2025-04-18 DEVICE — REPLAY HEMOSTASIS CLIP, 11MM SPAN
Type: IMPLANTABLE DEVICE
Brand: REPLAY

## 2025-04-18 DEVICE — REPLAY HEMOSTASIS CLIP, 16MM SPAN
Type: IMPLANTABLE DEVICE
Brand: REPLAY

## 2025-04-18 RX ORDER — ONDANSETRON 2 MG/ML
4 INJECTION INTRAMUSCULAR; INTRAVENOUS EVERY 6 HOURS PRN
Status: DISCONTINUED | OUTPATIENT
Start: 2025-04-18 | End: 2025-04-23

## 2025-04-18 RX ORDER — METOCLOPRAMIDE HYDROCHLORIDE 5 MG/ML
5 INJECTION INTRAMUSCULAR; INTRAVENOUS EVERY 8 HOURS PRN
Status: DISCONTINUED | OUTPATIENT
Start: 2025-04-18 | End: 2025-04-23

## 2025-04-18 RX ORDER — POTASSIUM CHLORIDE 1500 MG/1
40 TABLET, EXTENDED RELEASE ORAL EVERY 4 HOURS
Status: DISPENSED | OUTPATIENT
Start: 2025-04-18 | End: 2025-04-18

## 2025-04-18 RX ORDER — ONDANSETRON 8 MG/1
8 TABLET, FILM COATED ORAL EVERY 8 HOURS PRN
COMMUNITY
Start: 2025-02-28

## 2025-04-18 RX ORDER — INSULIN ASPART 100 [IU]/ML
INJECTION, SOLUTION INTRAVENOUS; SUBCUTANEOUS
Status: DISPENSED
Start: 2025-04-18 | End: 2025-04-19

## 2025-04-18 RX ORDER — INSULIN ASPART 100 [IU]/ML
0.2 INJECTION, SOLUTION INTRAVENOUS; SUBCUTANEOUS ONCE
Status: DISCONTINUED | OUTPATIENT
Start: 2025-04-18 | End: 2025-04-23

## 2025-04-18 RX ORDER — ESMOLOL HYDROCHLORIDE 10 MG/ML
INJECTION INTRAVENOUS AS NEEDED
Status: DISCONTINUED | OUTPATIENT
Start: 2025-04-18 | End: 2025-04-18 | Stop reason: SURG

## 2025-04-18 RX ORDER — LIDOCAINE HYDROCHLORIDE 10 MG/ML
INJECTION, SOLUTION EPIDURAL; INFILTRATION; INTRACAUDAL; PERINEURAL AS NEEDED
Status: DISCONTINUED | OUTPATIENT
Start: 2025-04-18 | End: 2025-04-18 | Stop reason: SURG

## 2025-04-18 RX ORDER — APIXABAN 5 MG/1
5 TABLET, FILM COATED ORAL 2 TIMES DAILY
COMMUNITY
Start: 2025-03-07

## 2025-04-18 RX ORDER — SODIUM CHLORIDE 9 MG/ML
INJECTION, SOLUTION INTRAVENOUS ONCE
Status: COMPLETED | OUTPATIENT
Start: 2025-04-18 | End: 2025-04-18

## 2025-04-18 RX ORDER — DEXTROSE MONOHYDRATE 25 G/50ML
50 INJECTION, SOLUTION INTRAVENOUS
Status: DISCONTINUED | OUTPATIENT
Start: 2025-04-18 | End: 2025-04-23

## 2025-04-18 RX ORDER — AMITRIPTYLINE HYDROCHLORIDE 10 MG/1
10 TABLET ORAL EVERY EVENING
Status: DISCONTINUED | OUTPATIENT
Start: 2025-04-18 | End: 2025-04-23

## 2025-04-18 RX ORDER — SODIUM CHLORIDE 9 MG/ML
INJECTION, SOLUTION INTRAVENOUS ONCE
Status: DISCONTINUED | OUTPATIENT
Start: 2025-04-18 | End: 2025-04-23

## 2025-04-18 RX ORDER — ACETAMINOPHEN 500 MG
500 TABLET ORAL EVERY 4 HOURS PRN
Status: DISCONTINUED | OUTPATIENT
Start: 2025-04-18 | End: 2025-04-23

## 2025-04-18 RX ORDER — NICOTINE POLACRILEX 4 MG
15 LOZENGE BUCCAL
Status: DISCONTINUED | OUTPATIENT
Start: 2025-04-18 | End: 2025-04-23

## 2025-04-18 RX ORDER — INSULIN ASPART 100 [IU]/ML
8 INJECTION, SOLUTION INTRAVENOUS; SUBCUTANEOUS ONCE
Status: COMPLETED | OUTPATIENT
Start: 2025-04-18 | End: 2025-04-18

## 2025-04-18 RX ORDER — METOPROLOL TARTRATE 50 MG
50 TABLET ORAL 2 TIMES DAILY
Status: DISCONTINUED | OUTPATIENT
Start: 2025-04-18 | End: 2025-04-20

## 2025-04-18 RX ORDER — NICOTINE POLACRILEX 4 MG
30 LOZENGE BUCCAL
Status: DISCONTINUED | OUTPATIENT
Start: 2025-04-18 | End: 2025-04-23

## 2025-04-18 RX ORDER — ASPIRIN 81 MG/1
81 TABLET ORAL DAILY
COMMUNITY
End: 2025-04-23

## 2025-04-18 RX ORDER — SODIUM CHLORIDE 9 MG/ML
INJECTION, SOLUTION INTRAVENOUS CONTINUOUS
Status: ACTIVE | OUTPATIENT
Start: 2025-04-18 | End: 2025-04-18

## 2025-04-18 RX ORDER — MAGNESIUM OXIDE 400 MG/1
400 TABLET ORAL ONCE
Status: COMPLETED | OUTPATIENT
Start: 2025-04-18 | End: 2025-04-18

## 2025-04-18 RX ORDER — FINASTERIDE 5 MG/1
5 TABLET, FILM COATED ORAL DAILY
Status: DISCONTINUED | OUTPATIENT
Start: 2025-04-18 | End: 2025-04-23

## 2025-04-18 RX ORDER — SODIUM CHLORIDE, SODIUM LACTATE, POTASSIUM CHLORIDE, CALCIUM CHLORIDE 600; 310; 30; 20 MG/100ML; MG/100ML; MG/100ML; MG/100ML
INJECTION, SOLUTION INTRAVENOUS CONTINUOUS PRN
Status: DISCONTINUED | OUTPATIENT
Start: 2025-04-18 | End: 2025-04-18 | Stop reason: SURG

## 2025-04-18 RX ORDER — POTASSIUM CHLORIDE 1500 MG/1
40 TABLET, EXTENDED RELEASE ORAL ONCE
Status: COMPLETED | OUTPATIENT
Start: 2025-04-18 | End: 2025-04-18

## 2025-04-18 RX ADMIN — ESMOLOL HYDROCHLORIDE 50 MG: 10 INJECTION INTRAVENOUS at 13:10:00

## 2025-04-18 RX ADMIN — SODIUM CHLORIDE, SODIUM LACTATE, POTASSIUM CHLORIDE, CALCIUM CHLORIDE: 600; 310; 30; 20 INJECTION, SOLUTION INTRAVENOUS at 12:36:00

## 2025-04-18 RX ADMIN — LIDOCAINE HYDROCHLORIDE 50 MG: 10 INJECTION, SOLUTION EPIDURAL; INFILTRATION; INTRACAUDAL; PERINEURAL at 12:38:00

## 2025-04-18 NOTE — PLAN OF CARE
Acquired patient around 0730. Alert and oriented x4. On Ra. SpO2 within normal limits. Afib on tele. -110s. HR can reach 120-130 but non sustaining. Cardizem gtt. Holding DOAC. Pt denies sob or cp at this time. EGD this afternoon. Hgb 5.6 this AM. Provider notified. 1 prbc unit infusing. Plan to give 2 unit after EGD.  Protonix gtt stopped per GI MD. Protonix IV q12. Continent of bowel and bladder. Spouse contacted [facility] for Medical Documentions for Chemo treatment. Call light within reach. Continue plan of care.     1220: trop [phone], Lucas ENGLISH notified in person. Pt currently in endo for EGD.    1420: Pt back from endo. Per GI MD, hold off on Eliquis for now. If need heparin gtt that is okay. No active cp as of now.  EKG obtained. Plan to  echo.     Problem: Risk for Violence/Aggression  Goal: Absence of Violence/Aggression  Description: INTERVENTIONS: - Identify precipitating factors for behavior - Notify Charge RN/Provider - Consider decreasing stimulation - Consider distraction measures - Consider discussion with provider regarding prn meds  - Consider JOCELYN (Moderate Risk only) - Consider Code Support (High Risk only) - Consider room safety checks - Consider restraints  Outcome: Progressing     Problem: CARDIOVASCULAR - ADULT  Goal: Maintains optimal cardiac output and hemodynamic stability  Description: INTERVENTIONS:- Monitor vital signs, rhythm, and trends- Monitor for bleeding, hypotension and signs of decreased cardiac output- Evaluate effectiveness of vasoactive medications to optimize hemodynamic stability- Monitor arterial and/or venous puncture sites for bleeding and/or hematoma- Assess quality of pulses, skin color and temperature- Assess for signs of decreased coronary artery perfusion - ex. Angina- Evaluate fluid balance, assess for edema, trend weights  Outcome: Progressing  Goal: Absence of cardiac arrhythmias or at baseline  Description: INTERVENTIONS:- Continuous cardiac monitoring, monitor  vital signs, obtain 12 lead EKG if indicated- Evaluate effectiveness of antiarrhythmic and heart rate control medications as ordered- Initiate emergency measures for life threatening arrhythmias- Monitor electrolytes and administer replacement therapy as ordered  Outcome: Progressing     Problem: PAIN - ADULT  Goal: Verbalizes/displays adequate comfort level or patient's stated pain goal  Description: INTERVENTIONS:- Encourage pt to monitor pain and request assistance- Assess pain using appropriate pain scale- Administer analgesics based on type and severity of pain and evaluate response- Implement non-pharmacological measures as appropriate and evaluate response- Consider cultural and social influences on pain and pain management- Manage/alleviate anxiety- Utilize distraction and/or relaxation techniques- Monitor for opioid side effects- Notify MD/LIP if interventions unsuccessful or patient reports new pain- Anticipate increased pain with activity and pre-medicate as appropriate  Outcome: Progressing     Problem: RESPIRATORY - ADULT  Goal: Achieves optimal ventilation and oxygenation  Description: INTERVENTIONS:- Assess for changes in respiratory status- Assess for changes in mentation and behavior- Position to facilitate oxygenation and minimize respiratory effort- Oxygen supplementation based on oxygen saturation or ABGs- Provide Smoking Cessation handout, if applicable- Encourage broncho-pulmonary hygiene including cough, deep breathe, Incentive Spirometry- Assess the need for suctioning and perform as needed- Assess and instruct to report SOB or any respiratory difficulty- Respiratory Therapy support as indicated- Manage/alleviate anxiety- Monitor for signs/symptoms of CO2 retention  Outcome: Progressing

## 2025-04-18 NOTE — PLAN OF CARE
Troponin elevation noted. The patient remains chest pain free. He has active GI bleeding with invasive pancreatic cancer and would be a poor interventional candidate for coronary ischemia and per GI a poor candidate for further endoscopic intervention if he were to bleed further.  We will follow further troponis if he develops acute symptoms indicative of ACS otherwise will monitor closely

## 2025-04-18 NOTE — CONSULTS
[facility] IP Report of Consultation Gastroenterology    4/18/2025    Shaq Song  male   Abner Woods MD     [id number]  10/2/1940 Primary Care Physician  Jose Palencia MD     Reason for Consultation: gi bleed    HPI    85 yo M with a fib on eliquis and pancreatic cancer on chemo here for black stool and hgb 5.6 (10 on 3/8/25).     Noted chest pain after dinner yesterday and difficulty breathing. Recent iron infusion. Black stool for past 2 days. No abdominal pain or nsaid use.    Last chemo two days prior to admission.     PROBLEM LIST:   Problem List[1]    PATIENT HISTORY:     Past Medical History[2]   Past Surgical History[3]   Family History[4]   Short Social Hx on File[5]     Allergies;  Allergies[6]     Medications:  Current Hospital Medications[7]     REVIEW OF SYSTEMS:   10 point ros reviewed. Pertinent positive per HPI.    EXAM:   /56   Pulse 112   Temp 98.4 °F (36.9 °C) (Oral)   Resp 13   Wt 159 lb 2.8 oz (72.2 kg)   SpO2 92%   BMI 24.93 kg/m²  Body mass index is 24.93 kg/m².  GENERAL: Well developed, well nourished, in no obvious distress.   CV: RRR  PULM: CTAB  ABDOMEN: Bowel sounds normoactive. Soft, no organomegaly or masses appreciated. Nontender.   EXTREMITIES: No peripheral edema appreciated.   NEURO: Alert and Oriented x 3.        LAB/IMAGING RESULTS:     Lab Results   Component Value Date    WBC 3.6 04/18/2025    HGB 5.6 04/18/2025    HCT 17.1 04/18/2025    PLT 86.0 04/18/2025     [unfilled]  Lab Results   Component Value Date    WBC 3.6 04/18/2025    HGB 5.6 04/18/2025    HCT 17.1 04/18/2025    PLT 86.0 04/18/2025    CREATSERUM 1.02 04/18/2025    BUN 31 04/18/2025     04/18/2025    K 3.6 04/18/2025     04/18/2025    CO2 23.0 04/18/2025     04/18/2025    CA 8.8 04/18/2025    ALB 3.3 04/18/2025    ALKPHO 32 04/18/2025    BILT 0.4 04/18/2025    TP 5.3 04/18/2025    AST 33 04/18/2025    ALT 11 04/18/2025    MG 1.6 04/18/2025    PGLU 286 04/18/2025          ASSESSMENT AND PLAN:   Melena  Anemia    85 yo M with a fib on eliquis and pancreatic cancer on chemo here for black stool and hgb 5.6 (10 on 3/8/25). Anemia may be multifactorial gi bleed and chemo as wbc and platelets also decreased.     - npo for egd  - pantoprazole 40 mg q12h iv  - hold eliquis  - recheck hgb post transfusion goal > 7 for egd    Abner Woods MD  4/18/2025           [1]   Patient Active Problem List  Diagnosis    Glucose intolerance (impaired glucose tolerance)    Pure hyperglyceridemia    Pure hypercholesterolemia    Esophageal reflux    Insomnia    Heart murmur    Thoracic ascending aortic aneurysm    LAE (left atrial enlargement)    Left ventricular diastolic dysfunction with preserved systolic function    Benign prostatic hyperplasia with lower urinary tract symptoms    Essential hypertension with goal blood pressure less than 130/80    Aortic valve sclerosis    History of pulmonary embolism    Non-rheumatic mitral regurgitation    Pancreatic cyst (HCC)    Mitral valve prolapse    Hypokalemia    Localized osteoarthritis of right shoulder    Pancreatic mass (HCC)    Gross hematuria    Atrial fibrillation with rapid ventricular response (HCC)    Diarrhea, unspecified type    Malignant neoplasm of pancreas, unspecified location of malignancy (HCC)   [2]   Past Medical History:   Actinic keratoses    Arrhythmia    AFib    Bladder stones    Colon polyp    DEPRESSION    Depression    Diabetes (HCC)    Enlarged prostate with urinary retention    Esophageal reflux    Morocho catheter in place    Gastritis    Gastro-esophageal reflux disease with esophagitis    Glucose intolerance    Hearing impaired person, bilateral    Hiatal hernia    High blood pressure    HYPERTRIGLYCERIDEMIA    Incontinence    Has a urinary cathether, can be incontinent of stool with urinary urgency    LAE (left atrial enlargement)    LAE (left atrial enlargement)    Left ventricular diastolic dysfunction with preserved  systolic function    LVH (left ventricular hypertrophy)    Mild ascending aorta dilation    Osteoarthrosis, unspecified whether generalized or localized, unspecified site    Other and unspecified hyperlipidemia    Pancreas cancer (HCC)    Pancreas cyst (HCC)    bx    Pulmonary embolism (HCC)    Many years ago    RENAL DISEASE    RLS (restless legs syndrome)    Synovial cyst of lumbar facet joint    Unspecified essential hypertension    Vasovagal syncope    Visual impairment    glasses   [3]   Past Surgical History:  Procedure Laterality Date    Colonoscopy  2010    POLYP/5YRS, Dr Garcia    Egd and esd - internal  2017    Esophageal bx benign, pancreatic cyst aspirartion.  Dr Holland    Remove bladder stone,>2.5cm  2025    with TURP    Tonsillectomy      Transurethral elec-surg prostatectom  2025    with bladder stones laser litho    Upper gi endoscopy - referral  2010    Upper gi endoscopy,exam  02/10/2025   [4]   Family History  Problem Relation Age of Onset    Cancer Father         esophageal    Cancer Mother         breast   [5]   Social History  Socioeconomic History    Marital status:    Tobacco Use    Smoking status: Former     Current packs/day: 0.00     Types: Cigarettes     Quit date: 1958     Years since quittin.3    Smokeless tobacco: Never   Vaping Use    Vaping status: Never Used   Substance and Sexual Activity    Alcohol use: Not Currently     Comment: 2-3/MONTH    Drug use: No    Sexual activity: Yes     Comment:      Social Drivers of Health     Food Insecurity: No Food Insecurity (2025)    NCSS - Food Insecurity     Worried About Running Out of Food in the Last Year: No     Ran Out of Food in the Last Year: No   Transportation Needs: No Transportation Needs (2025)    NCSS - Transportation     Lack of Transportation: No   Housing Stability: Not At Risk (2025)    NCSS - Housing/Utilities     Has Housing: Yes     Worried About Losing  Housing: No     Unable to Get Utilities: No   [6]   Allergies  Allergen Reactions    Pcn [Penicillins]      \" FAINTED\"   [7]   Current Facility-Administered Medications:     acetaminophen (Tylenol Extra Strength) tab 500 mg, 500 mg, Oral, Q4H PRN    ondansetron (Zofran) 4 MG/2ML injection 4 mg, 4 mg, Intravenous, Q6H PRN    metoclopramide (Reglan) 5 mg/mL injection 5 mg, 5 mg, Intravenous, Q8H PRN    pantoprazole (Protonix) 40 mg in sodium chloride 0.9% PF 10 mL IV push, 40 mg, Intravenous, Q12H    sodium chloride 0.9% infusion, , Intravenous, Continuous    [START ON 4/19/2025] cefTRIAXone (Rocephin) 1 g in sodium chloride 0.9% 100 mL IVPB-ADDV, 1 g, Intravenous, Q24H    finasteride (Proscar) tab 5 mg, 5 mg, Oral, Daily    glucose (Dex4) 15 GM/59ML oral liquid 15 g, 15 g, Oral, Q15 Min PRN **OR** glucose (Glutose) 40% oral gel 15 g, 15 g, Oral, Q15 Min PRN **OR** glucose-vitamin C (Dex-4) chewable tab 4 tablet, 4 tablet, Oral, Q15 Min PRN **OR** dextrose 50% injection 50 mL, 50 mL, Intravenous, Q15 Min PRN **OR** glucose (Dex4) 15 GM/59ML oral liquid 30 g, 30 g, Oral, Q15 Min PRN **OR** glucose (Glutose) 40% oral gel 30 g, 30 g, Oral, Q15 Min PRN **OR** glucose-vitamin C (Dex-4) chewable tab 8 tablet, 8 tablet, Oral, Q15 Min PRN    insulin aspart (NovoLOG) 100 Units/mL FlexPen 1-10 Units, 1-10 Units, Subcutaneous, TID AC and HS    insulin aspart (NovoLOG) 100 Units/mL vial 15 Units, 0.2 Units/kg, Subcutaneous, Once    potassium chloride (Klor-Con M20) tab 40 mEq, 40 mEq, Oral, Q4H    magnesium oxide (Mag-Ox) tab 400 mg, 400 mg, Oral, Once    [COMPLETED] dilTIAZem 10 mg BOLUS FROM BAG infusion, 10 mg, Intravenous, Once **AND** dilTIAZem (cardIZEM) 100 mg in sodium chloride 0.9% 100 mL IVPB-ADDV, 2.5-20 mg/hr, Intravenous, Continuous

## 2025-04-18 NOTE — PROGRESS NOTES
Pt admitted into [id number] at 0300 for Afib RVR.    Orthostatic BP deferred on admission d/t Hgb 5.6 and current RBC infusing along w/ Cardizem, Protonix, and IVF. Pt A&Ox4. Skin check completed w/ CARMEN, PCT. Oriented to room and unit. Call light in reach, menu in room. Pt updated and agrees with plan of care. See flowsheets for further assessment.

## 2025-04-18 NOTE — ED PROVIDER NOTES
Patient Seen in: [facility] Emergency Department      History     Chief Complaint   Patient presents with    Chest Pain Angina    Fatigue     Stated Complaint: afibrvr, WEAKNESS, DIZZY,, PANCREATIC CA    Subjective:   HPI    Patient is an 84-year-old male presents to ED for evaluation of multiple complaints.  Patient with history of pancreatic cancer, atrial fibrillation on Eliquis, metoprolol.  He states he underwent chemotherapy 2 days ago.  Yesterday he developed weakness and diarrhea.  No vomiting.  He developed chest heaviness worse with breathing since after dinner tonight.  History of pulmonary embolism in the past.  No coronary stents.  Patient states he received iron infusion for low white blood cell count after chemotherapy 2 days ago.  Patient has no other complaints.  He does not feel his heart racing although his heart rates in the 150s.  Patient also states he is having black stool for the last 2 days.  Denies history of GI bleeding in the past.  History of Present Illness               Objective:     Past Medical History:    Actinic keratoses    Arrhythmia    AFib    Bladder stones    Colon polyp    DEPRESSION    Depression    Diabetes (HCC)    Enlarged prostate with urinary retention    Esophageal reflux    Morocho catheter in place    Gastritis    Gastro-esophageal reflux disease with esophagitis    Glucose intolerance    Hearing impaired person, bilateral    Hiatal hernia    High blood pressure    HYPERTRIGLYCERIDEMIA    Incontinence    Has a urinary cathether, can be incontinent of stool with urinary urgency    LAE (left atrial enlargement)    LAE (left atrial enlargement)    Left ventricular diastolic dysfunction with preserved systolic function    LVH (left ventricular hypertrophy)    Mild ascending aorta dilation    Osteoarthrosis, unspecified whether generalized or localized, unspecified site    Other and unspecified hyperlipidemia    Pancreas cancer (HCC)    Pancreas cyst (HCC)    bx     Pulmonary embolism (HCC)    Many years ago    RENAL DISEASE    RLS (restless legs syndrome)    Synovial cyst of lumbar facet joint    Unspecified essential hypertension    Vasovagal syncope    Visual impairment    glasses              Past Surgical History:   Procedure Laterality Date    Colonoscopy  2010    POLYP/5YRS, Dr Garcia    Egd and esd - internal  2017    Esophageal bx benign, pancreatic cyst aspirartion.  Dr Holland    Remove bladder stone,>2.5cm  2025    with TURP    Tonsillectomy      Transurethral elec-surg prostatectom  2025    with bladder stones laser litho    Upper gi endoscopy - referral  2010    Upper gi endoscopy,exam  02/10/2025                Social History     Socioeconomic History    Marital status:    Tobacco Use    Smoking status: Former     Current packs/day: 0.00     Types: Cigarettes     Quit date: 1958     Years since quittin.3    Smokeless tobacco: Never   Vaping Use    Vaping status: Never Used   Substance and Sexual Activity    Alcohol use: Not Currently     Comment: 2-3/MONTH    Drug use: No    Sexual activity: Yes     Comment:      Social Drivers of Health     Food Insecurity: No Food Insecurity (2025)    NCSS - Food Insecurity     Worried About Running Out of Food in the Last Year: No     Ran Out of Food in the Last Year: No   Transportation Needs: No Transportation Needs (2025)    NCSS - Transportation     Lack of Transportation: No   Housing Stability: Not At Risk (2025)    NCSS - Housing/Utilities     Has Housing: Yes     Worried About Losing Housing: No     Unable to Get Utilities: No                                Physical Exam     ED Triage Vitals   BP 25 2147 139/88   Pulse 25 2147 (!) 158   Resp 25 2147 18   Temp 25 2200 98 °F (36.7 °C)   Temp src 25 2200 Oral   SpO2 25 214 100 %   O2 Device 25 214 None (Room air)       Current Vitals:   Vital Signs  BP:  114/47  Pulse: 111  Resp: 20  Temp: 98.4 °F (36.9 °C)  Temp src: Oral  MAP (mmHg): 66    Oxygen Therapy  SpO2: 97 %  O2 Device: None (Room air)  Pulse Oximetry Type: Continuous  Oximetry Probe Site Changed: No  Pulse Ox Probe Location: Left hand        Physical Exam  GENERAL: No acute distress, well appearing and non-toxic, Alert and oriented X 3   HEENT: Normocephalic, atraumatic.  Moist mucous membranes.  Pupils equal round reactive to light and accommodation, extraocular motion is intact, sclerae white, conjunctiva is pale.  Oropharynx is unremarkable, no exudate.  NECK: Supple, trachea midline, no lymphadenopathy.   LUNG: Lungs clear to auscultation bilaterally, no wheezing, no rales, no rhonchi.  CARDIOVASCULAR:  Tachycardic.  Irregularly irregularrate and rhythm.  Normal S1S2.  No S3S4.  Murmur noted at the apex of the heart sounds systolic  ABDOMEN: Bowel sounds are present. Soft. nondistended, no pulsatile masses. nontender  MUSCULOSKELETAL: No calf tenderness.  Dorsalis and Posterior Tibial pulses present. No clubbing. No cyanosis.  No edema.   SKIN EXAMINATIoN: Pale  Rectal: Declined  NEUROLOGICAL:  Motor strength intact all groups.  normal sensation, speech intact  Physical Exam                ED Course     Labs Reviewed   COMP METABOLIC PANEL (14) - Abnormal; Notable for the following components:       Result Value    Glucose 359 (*)     Potassium 3.3 (*)     BUN 41 (*)     Calculated Osmolality 313 (*)     Alkaline Phosphatase 35 (*)     All other components within normal limits   TROPONIN I HIGH SENSITIVITY - Abnormal; Notable for the following components:    Troponin I (High Sensitivity) 121 (*)     All other components within normal limits   CBC WITH DIFFERENTIAL WITH PLATELET - Abnormal; Notable for the following components:    RBC 1.95 (*)     HGB 5.6 (*)     HCT 17.0 (*)     .0 (*)     Lymphocyte Absolute 0.72 (*)     Monocyte Absolute 0.08 (*)     All other components within normal limits    URINALYSIS WITH CULTURE REFLEX - Abnormal; Notable for the following components:    Urine Color Colorless (*)     Glucose Urine >1000 (*)     Ketones Urine 10 (*)     Blood Urine Trace (*)     Leukocyte Esterase Urine 75 (*)     WBC Urine 21-50 (*)     RBC Urine 3-5 (*)     All other components within normal limits   LIPID PANEL - Abnormal; Notable for the following components:    HDL Cholesterol 30 (*)     Triglycerides 197 (*)     All other components within normal limits   POCT GLUCOSE - Abnormal; Notable for the following components:    POC Glucose 406 (*)     All other components within normal limits   POCT GLUCOSE - Abnormal; Notable for the following components:    POC Glucose 404 (*)     All other components within normal limits   POCT GLUCOSE - Abnormal; Notable for the following components:    POC Glucose 413 (*)     All other components within normal limits   POCT GLUCOSE - Abnormal; Notable for the following components:    POC Glucose 399 (*)     All other components within normal limits   POCT GLUCOSE - Abnormal; Notable for the following components:    POC Glucose 286 (*)     All other components within normal limits   COMP METABOLIC PANEL (14)   MAGNESIUM   CBC WITH DIFFERENTIAL WITH PLATELET   HEMOGLOBIN A1C   TROPONIN I HIGH SENSITIVITY   TYPE AND SCREEN    Narrative:     The following orders were created for panel order Type and screen.  Procedure                               Abnormality         Status                     ---------                               -----------         ------                     ABORH (Blood Type)[[id number]]                               Final result               Antibody Screen[[id number]]                                  Final result                 Please view results for these tests on the individual orders.   ABORH (BLOOD TYPE)   ANTIBODY SCREEN   ABORH CONFIRMATION   PREPARE RBC   RAINBOW DRAW LAVENDER   RAINBOW DRAW LIGHT GREEN   RAINBOW DRAW BLUE   RAINBOW  DRAW GOLD   URINE CULTURE, ROUTINE   GI STOOL PANEL BY PCR   C. DIFFICILE(TOXIGENIC)PCR   Glucose 359.  Troponin 121.  Hemoglobin 5.6.  EKG    Rate, intervals and axes as noted on EKG Report.  Rate: 156  Rhythm: Atrial Fibrillation  Reading: Atrial fibrillation with ST depression V4 through V6.              Results            I personally reviewed xray films of chest and independent interpretation shows no evidence for failure.  I also reviewed formal xray report as read by radiology with findings below:    CTA CHEST (CPT=71275)  Result Date: 4/17/2025  PROCEDURE:  CT ANGIOGRAPHY, CHEST (CPT=71275)  COMPARISON:  [facility] , CT, CT ANGIOGRAPHY, CHEST (CPT=71275), 3/20/2015, 0:21 AM.  INDICATIONS:  afibrvr, WEAKNESS, DIZZY,, PANCREATIC CA  TECHNIQUE:  IV contrast-enhanced multislice CT angiography is performed through the pulmonary arterial anatomy. 3D volume renderings are generated.  Dose reduction techniques were used. Dose information is transmitted to the ACR (American College of Radiology) NRDR (National Radiology Data Registry) which includes the Dose Index Registry.  PATIENT STATED HISTORY:(As transcribed by Technologist)  c/o fatigue, dizziness and fatigue since chemo and iron infusion on Monday, hx of Pancreatic CA. Hx of afib on Eliquis   CONTRAST USED:  100cc of Isovue 370  FINDINGS:  LUNGS:  Mild scattered atelectasis/scarring.  No lobar consolidation.  Large airways are unremarkable.  Minimal biapical pleural-parenchymal scarring. VASCULATURE:    No CT evidence of acute pulmonary embolism. THORACIC AORTA:    Mild mixed plaque.  No acute dissection.  Ectasia of the ascending aorta measuring up to 4.4 cm in diameter can be further assessed with gated CTA of the thoracic aorta as clinically directed. MEDIASTINUM/GINI:    Unremarkable CARDIAC:    Moderate to extensive coronary artery calcifications.  Minimal fluid in the pericardial recesses. PLEURA:    Small bilateral pleural effusions. CHEST WALL:     Nonspecific soft tissue edema.  Left chest port.  LIMITED ABDOMEN:    Subcentimeter hypodensities in the partially imaged liver measuring up to 9 mm are too small to further characterize and warrant no specific follow-up. BONES:    Degenerative changes in the spine.  Flowing osteophytes may be due to DISH. OTHER:    None            CONCLUSION:   1. No CT evidence of acute pulmonary embolism or acute aortic dissection.  2. Ectasia of the ascending aorta measuring up to 4.4 cm in diameter can be further assessed with gated CTA of the thoracic aorta as clinically directed.  3. Small bilateral pleural effusions.  Please see above for further details.  LOCATION:  [facility]   Dictated by (CST): Uriah Rodriguez MD on 4/17/2025 at 11:12 PM     Finalized by (CST): Uriah Rodriguez MD on 4/17/2025 at 11:15 PM       XR CHEST AP PORTABLE  (CPT=71045)  Result Date: 4/17/2025  PROCEDURE:  XR CHEST AP PORTABLE  (CPT=71045)  TECHNIQUE:  AP chest radiograph was obtained.  COMPARISON:  [facility] , XR, XR CHEST AP PORTABLE  (CPT=71010), 3/19/2015, 10:30 PM.  INDICATIONS:  afibrvr, WEAKNESS, DIZZY,, PANCREATIC CA  PATIENT STATED HISTORY: (As transcribed by Technologist)  afib, weakness    FINDINGS:  CT compatible left chest port tip in the upper SVC.  Cardiomegaly with normal pulmonary vascularity.  No pleural effusion or pneumothorax.  Degenerative changes the spine.            CONCLUSION:  No active cardiopulmonary process identified.   LOCATION:  [facility]      Dictated by (CST): Uriah Rodriguez MD on 4/17/2025 at 10:24 PM     Finalized by (CST): Uriah Rodriguez MD on 4/17/2025 at 10:24 PM           Medications   dilTIAZem 10 mg BOLUS FROM BAG infusion (10 mg Intravenous Bolus from Bag 4/17/25 4660)     And   dilTIAZem (cardIZEM) 100 mg in sodium chloride 0.9% 100 mL IVPB-ADDV (20 mg/hr Intravenous New Bag 4/18/25 0245)   pantoprazole (Protonix) 80 mg in sodium chloride 0.9% 100 mL infusion (8 mg/hr Intravenous Handoff 4/18/25 0132)    acetaminophen (Tylenol Extra Strength) tab 500 mg (has no administration in time range)   ondansetron (Zofran) 4 MG/2ML injection 4 mg (has no administration in time range)   metoclopramide (Reglan) 5 mg/mL injection 5 mg (has no administration in time range)   pantoprazole (Protonix) 40 mg in sodium chloride 0.9% PF 10 mL IV push (has no administration in time range)   sodium chloride 0.9% infusion ( Intravenous New Bag 4/18/25 0313)   cefTRIAXone (Rocephin) 1 g in sodium chloride 0.9% 100 mL IVPB-ADDV (has no administration in time range)   finasteride (Proscar) tab 5 mg (has no administration in time range)   glucose (Dex4) 15 GM/59ML oral liquid 15 g (has no administration in time range)     Or   glucose (Glutose) 40% oral gel 15 g (has no administration in time range)     Or   glucose-vitamin C (Dex-4) chewable tab 4 tablet (has no administration in time range)     Or   dextrose 50% injection 50 mL (has no administration in time range)     Or   glucose (Dex4) 15 GM/59ML oral liquid 30 g (has no administration in time range)     Or   glucose (Glutose) 40% oral gel 30 g (has no administration in time range)     Or   glucose-vitamin C (Dex-4) chewable tab 8 tablet (has no administration in time range)   insulin aspart (NovoLOG) 100 Units/mL FlexPen 1-10 Units (15 Units Subcutaneous Given 4/18/25 0417)   insulin aspart (NovoLOG) 100 Units/mL vial 15 Units (15 Units Subcutaneous Not Given 4/18/25 0219)   sodium chloride 0.9 % IV bolus 1,000 mL (0 mL Intravenous Stopped 4/17/25 2250)   pantoprazole (Protonix) 40 mg in sodium chloride 0.9% PF 10 mL IV push (40 mg Intravenous Given 4/17/25 2309)   iopamidol 76% (ISOVUE-370) injection for power injector (100 mL Intravenous Given 4/17/25 2256)   metoprolol (Lopressor) 5 mg/5mL injection 5 mg (5 mg Intravenous Given 4/17/25 2325)   digoxin (Lanoxin) 250 MCG/ML injection 250 mcg (250 mcg Intravenous Given 4/18/25 0002)   potassium chloride (Klor-Con M20) tab 40 mEq (40  mEq Oral Given 4/18/25 0108)   cefTRIAXone (Rocephin) 2 g in sodium chloride 0.9% 100 mL IVPB-ADDV (0 g Intravenous Stopped 4/18/25 0124)                          MDM      Patient is an 84-year-old male presents to ED for evaluation of weakness, diarrhea.  Patient with pancreatic cancer.  Presents in atrial fibrillation.  Complains of black stool.  Differential GI bleed, rapid atrial fibrillation, pulmonary embolism, dehydration, anemia.  Patient went laboratory testing showing hemoglobin of 5.6.  Prior hemoglobin 1 month ago in the 10 range.  Wife states all of his blood counts were low after chemotherapy couple days ago but does not know the numbers.  Complains of black stool refusing rectal.  Potential GI bleed.  Patient requiring blood transfusion.  Risks and benefits of blood transfusion were discussed with patient. Patient informed of possibility of communicable diseases such as HIV/hepatitis with blood transfusion. Patient consented for blood transfusion.  Patient given IV Protonix.  Case discussed with GI specialist who stated to hold Eliquis.  Do not give Kcentra.  Start Protonix drip which was started.  Likely needs EGD potential colonoscopy to rule out GI bleed.  Patient in rapid atrial fibrillation 140s-150s rate.  Given 10 mg boluses of Cardizem x 3.  Still tachycardic.  IV Lopressor given which improved his heart rate to the 110s.  Spoke with cardiology, Dr. latham stated to load with digoxin which was done.  CT chest performed showing no evidence for pulmonary embolism.  Chest x-ray shows no evidence for heart failure.  Patient will be admitted to cardiac telemetry with GI and cardiology consult who I already spoke with.  Spoke with the hospitalist.  Critical care time was 90 minutes. This critical care time is exclusive of procedures critical care time includes monitoring of patient's cardiopulmonary and hemodynamic status, interpretation of laboratory values, and discussion of case with physician and  consultants.    Admission disposition: 4/17/2025 11:17 PM           Medical Decision Making      Disposition and Plan     Clinical Impression:  1. Atrial fibrillation with rapid ventricular response (HCC)    2. Diarrhea, unspecified type    3. Malignant neoplasm of pancreas, unspecified location of malignancy (HCC)    4.  Anemia  5.  Black stool, rule out GI bleed    Disposition:  Admit  4/17/2025 11:17 pm    Follow-up:  No follow-up provider specified.        Medications Prescribed:  Current Discharge Medication List          Supplementary Documentation:         Hospital Problems       Present on Admission  Date Reviewed: 11/15/2022          ICD-10-CM Noted POA    * (Principal) Atrial fibrillation with rapid ventricular response (HCC) I48.91 4/17/2025 Unknown    Diarrhea, unspecified type R19.7 4/18/2025 Unknown    Malignant neoplasm of pancreas, unspecified location of malignancy (HCC) C25.9 4/18/2025 Unknown

## 2025-04-18 NOTE — SPIRITUAL CARE NOTE
Spiritual Care Visit Note    Patient Name: Shaq Song Date of Spiritual Care Visit: 25   : 10/2/1940 Primary Dx: Atrial fibrillation with rapid ventricular response (HCC)   Referred By:  ED rounds    Spiritual Care Taxonomy:    Intended Effects: Demonstrate caring and concern    Methods: Encourage sharing of feelings, Encourage story-telling, Explore quality of life, Offer emotional support    Interventions: Acknowledge current situation, Active listening, Ask guided questions, Ask questions to bring forth feelings, Identify supportive relationship(s), Assist with identifying strengths, Share words of hope and inspiration    Visit Type/Summary:  Shaq was with a medical team when  met with his family, wife Lyndsay, son and daughter-in-law in the hallway. They were appropriately worried discussing Shaq's progress through chemotherapy and the effect is was having on he and Lyndsay, including discussion about realistic and hoped for results.  All were in good spirits given the circumstances and would continue to have hope. Lyndsay was most concerned about Shaq's care along the way. She did not seem to be concerned about her own stress levels, but her son assured he would help her by mindful of her own health, too.  They seemed to be good support for each other and echo that for Shaq.   - Spiritual Care: Consulted with RN prior to visit. Offered empathic listening and emotional support. Provided information regarding how to contact Spiritual Care.  remains available as needed for follow up.    Spiritual Care support can be requested via an Epic consult. For urgent/immediate needs, please contact the On Call  at: [facility]: ext [phone]    Jennifer Araiza MDiv.  Staff

## 2025-04-18 NOTE — CONSULTS
A-fib, RVR,  Cardiology Consultation      Shaq Song Patient Status:  Inpatient    10/2/1940 MRN [id number]   Formerly [facility] 8NE-A Attending Carlos Collins*   Hosp Day # 0 PCP Jose Palencia MD     Reason for Consultation:  A-fib, RVR, elevated troponin, acute anemia    History of Present Illness:  Shaq Song is a(n) 84 year old male with chronic medical conditions including paroxysmal A-fib, moderate to severe mitral regurgitation with mitral valve prolapse, ascending aorta dilation, hypertension, hyperlipidemia, DM2, Hx DVT, pancreatic cancer who presents with worsening shortness of breath, palpitations, chest pressure at times.  Noted to have atrial fibrillation with rapid ventricular response as well as acute anemia.  Started on a Cardizem infusion and transfused.  Patient states that his chest discomfort improved.  He denies having any this morning.  States his last chemotherapy session was on Monday.  Notes that he gets IV iron infusions.  He started noticing dark stools after chemotherapy this week.  Progressive shortness of breath and fatigue since then.  Noticed that his blood pressure was also elevated therefore he started taking an extra medication that he cannot recall.  He notes he was compliant with his DOAC during this time.    History:  Past Medical History[1]  Past Surgical History[2]  Family History[3]   reports that he quit smoking about 67 years ago. His smoking use included cigarettes. He has never used smokeless tobacco. He reports that he does not currently use alcohol. He reports that he does not use drugs.    Allergies:  Allergies[4]    Medications:  Current Hospital Medications[5]    Review of Systems:  A comprehensive review of systems was negative if not otherwise mention in above HPI.    /56   Pulse 112   Temp 98.4 °F (36.9 °C) (Oral)   Resp 13   Wt 159 lb 2.8 oz (72.2 kg)   SpO2 92%   BMI 24.93 kg/m²   Temp (24hrs), Av.2 °F (36.8 °C),  Min:97.6 °F (36.4 °C), Max:98.4 °F (36.9 °C)       Intake/Output Summary (Last 24 hours) at 4/18/2025 0917  Last data filed at 4/18/2025 0708  Gross per 24 hour   Intake 390 ml   Output 850 ml   Net -460 ml     Wt Readings from Last 3 Encounters:   04/18/25 159 lb 2.8 oz (72.2 kg)   03/08/25 162 lb (73.5 kg)   02/27/25 165 lb (74.8 kg)       Physical Exam:   General: Alert and oriented x 3. No apparent distress. No respiratory or constitutional distress.  HEENT: Normocephalic, anicteric sclera, neck supple.  Neck: No JVD  Cardiac: Regular rate and rhythm. S1, S2 normal.  Grade 2 systolic murmur.  Lungs: Clear without wheezes, rales, rhonchi or dullness.  Normal excursions and effort.  Abdomen: Soft, non-tender. BS-present.  Extremities: Without clubbing, cyanosis or edema.    Neurologic: Alert and oriented, normal affect.  Skin: Warm and dry.     Laboratory Data:  Lab Results   Component Value Date    WBC 3.6 04/18/2025    HGB 5.6 04/18/2025    HCT 17.1 04/18/2025    PLT 86.0 04/18/2025    CREATSERUM 1.02 04/18/2025    BUN 31 04/18/2025     04/18/2025    K 3.6 04/18/2025     04/18/2025    CO2 23.0 04/18/2025     04/18/2025    CA 8.8 04/18/2025    ALB 3.3 04/18/2025    ALKPHO 32 04/18/2025    BILT 0.4 04/18/2025    TP 5.3 04/18/2025    AST 33 04/18/2025    ALT 11 04/18/2025    MG 1.6 04/18/2025    PGLU 286 04/18/2025       Imaging/results:  CTA chest - no pe. Small b/l effusions.  Troponin 121   Hgb 5.6   EKG - afib rvr       Assessment:  Acute blood loss anemia   Atrial fibrillation w/ RVR  Elevated troponin, suspect 2/2 demand from above.  Likely has underlying CAD as well given his age and risk factors.  Moderate to severe mitral regurgitation, mitral valve prolapse w/ partial flail of P2 scallop -- further intervention delayed given pancreatic cancer diagnosis  Ascending aorta dilation, 4.4 cm   Pancreatic cancer on chemotherapy  HTN  HLD  DM2  Hx remote provoked DVT       Plan:  Hold  DOAC.  Transfuse to maintain Hgb > 7   Cont cardizem gtt. w/ improving hgb, would expect improving afib rates.  Start metoprolol p.o. 20 mg twice daily.  Maintain mag >2  IV lasix 20 mg between units of PRBC  GI eval  Elevated but acceptable risk given above comorbidities and given necessity of remedying blood loss.  Further recs to follow       Addendum:  Repeat troponin checked, significantly elevated.  Unfortunately, unable to start heparin at this time given presentation with acute blood loss anemia.  Will await GI findings.  Remains chest pain-free.  Trend troponin until peak.  Repeat echo.  Obtain EKG.  Will follow.    Thank you for allowing me to participate in the care of your patient.      Teo Acosta DO  Cardiologist  [facility]  4/18/2025 9:17 AM      Note to the patient: The 21st Century Cures Act makes medical notes like these available to patients in the interest of transparency. However, be advised that this is a medical document. It is intended as peer to peer communication. It is written in medical language and may contain abbreviations or verbiage that are unfamiliar. It may appear blunt or direct. Medical documents are intended to carry relevant information, facts as evident, and clinical opinion of the practitioner.     Disclaimer: Components of this note were documented using voice recognition system and are subject to errors not corrected at proofreading. Contact the author of this note for any clarifications.          [1]   Past Medical History:   Actinic keratoses    Arrhythmia    AFib    Bladder stones    Colon polyp    DEPRESSION    Depression    Diabetes (HCC)    Enlarged prostate with urinary retention    Esophageal reflux    Morocho catheter in place    Gastritis    Gastro-esophageal reflux disease with esophagitis    Glucose intolerance    Hearing impaired person, bilateral    Hiatal hernia    High blood pressure    HYPERTRIGLYCERIDEMIA    Incontinence    Has a  urinary cathether, can be incontinent of stool with urinary urgency    LAE (left atrial enlargement)    LAE (left atrial enlargement)    Left ventricular diastolic dysfunction with preserved systolic function    LVH (left ventricular hypertrophy)    Mild ascending aorta dilation    Osteoarthrosis, unspecified whether generalized or localized, unspecified site    Other and unspecified hyperlipidemia    Pancreas cancer (HCC)    Pancreas cyst (HCC)    bx    Pulmonary embolism (HCC)    Many years ago    RENAL DISEASE    RLS (restless legs syndrome)    Synovial cyst of lumbar facet joint    Unspecified essential hypertension    Vasovagal syncope    Visual impairment    glasses   [2]   Past Surgical History:  Procedure Laterality Date    Colonoscopy  06/22/2010    POLYP/5YRS, Dr Garcia    Egd and esd - internal  12/11/2017    Esophageal bx benign, pancreatic cyst aspirartion.  Dr Holland    Remove bladder stone,>2.5cm  02/20/2025    with TURP    Tonsillectomy      Transurethral elec-surg prostatectom  02/20/2025    with bladder stones laser litho    Upper gi endoscopy - referral  06/22/2010    Upper gi endoscopy,exam  02/10/2025   [3]   Family History  Problem Relation Age of Onset    Cancer Father         esophageal    Cancer Mother         breast   [4]   Allergies  Allergen Reactions    Pcn [Penicillins]      \" FAINTED\"   [5]   Current Facility-Administered Medications:     acetaminophen (Tylenol Extra Strength) tab 500 mg, 500 mg, Oral, Q4H PRN    ondansetron (Zofran) 4 MG/2ML injection 4 mg, 4 mg, Intravenous, Q6H PRN    metoclopramide (Reglan) 5 mg/mL injection 5 mg, 5 mg, Intravenous, Q8H PRN    pantoprazole (Protonix) 40 mg in sodium chloride 0.9% PF 10 mL IV push, 40 mg, Intravenous, Q12H    sodium chloride 0.9% infusion, , Intravenous, Continuous    [START ON 4/19/2025] cefTRIAXone (Rocephin) 1 g in sodium chloride 0.9% 100 mL IVPB-ADDV, 1 g, Intravenous, Q24H    finasteride (Proscar) tab 5 mg, 5 mg, Oral, Daily     glucose (Dex4) 15 GM/59ML oral liquid 15 g, 15 g, Oral, Q15 Min PRN **OR** glucose (Glutose) 40% oral gel 15 g, 15 g, Oral, Q15 Min PRN **OR** glucose-vitamin C (Dex-4) chewable tab 4 tablet, 4 tablet, Oral, Q15 Min PRN **OR** dextrose 50% injection 50 mL, 50 mL, Intravenous, Q15 Min PRN **OR** glucose (Dex4) 15 GM/59ML oral liquid 30 g, 30 g, Oral, Q15 Min PRN **OR** glucose (Glutose) 40% oral gel 30 g, 30 g, Oral, Q15 Min PRN **OR** glucose-vitamin C (Dex-4) chewable tab 8 tablet, 8 tablet, Oral, Q15 Min PRN    insulin aspart (NovoLOG) 100 Units/mL FlexPen 1-10 Units, 1-10 Units, Subcutaneous, TID AC and HS    insulin aspart (NovoLOG) 100 Units/mL vial 15 Units, 0.2 Units/kg, Subcutaneous, Once    potassium chloride (Klor-Con M20) tab 40 mEq, 40 mEq, Oral, Q4H    magnesium oxide (Mag-Ox) tab 400 mg, 400 mg, Oral, Once    [COMPLETED] dilTIAZem 10 mg BOLUS FROM BAG infusion, 10 mg, Intravenous, Once **AND** dilTIAZem (cardIZEM) 100 mg in sodium chloride 0.9% 100 mL IVPB-ADDV, 2.5-20 mg/hr, Intravenous, Continuous

## 2025-04-18 NOTE — ANESTHESIA PREPROCEDURE EVALUATION
PRE-OP EVALUATION    Patient Name: Shaq Song    Admit Diagnosis: Atrial fibrillation with rapid ventricular response (HCC) [I48.91]  Malignant neoplasm of pancreas, unspecified location of malignancy (HCC) [C25.9]  Diarrhea, unspecified type [R19.7]    Pre-op Diagnosis: GI BLEED    ESOPHAGOGASTRODUODENOSCOPY (EGD)    Anesthesia Procedure: ESOPHAGOGASTRODUODENOSCOPY (EGD)    Surgeons and Role:     * Abner Woods MD - Primary    Pre-op vitals reviewed.  Temp: 98.4 °F (36.9 °C)  Pulse: 104  Resp: 15  BP: 131/63  SpO2: 100 %  Body mass index is 24.93 kg/m².    Current medications reviewed.  Hospital Medications:  Current Medications[1]    Outpatient Medications:   Prescriptions Prior to Admission[2]    Allergies: Pcn [penicillins]      Anesthesia Evaluation    Patient summary reviewed.    Anesthetic Complications  (-) history of anesthetic complications         GI/Hepatic/Renal      (+) GERD and well controlled                          Cardiovascular    Negative cardiovascular ROS.    Exercise tolerance: good     MET: >4      (+) hypertension                  (+) dysrhythmias and atrial fibrillation                  Endo/Other      (+) diabetes and well controlled, type 2,                          Pulmonary                           Neuro/Psych      (+) depression                        As per Epic:  Patient Active Problem List:     Glucose intolerance (impaired glucose tolerance)     Pure hyperglyceridemia     Pure hypercholesterolemia     Esophageal reflux     Insomnia     Heart murmur     Thoracic ascending aortic aneurysm     LAE (left atrial enlargement)     Left ventricular diastolic dysfunction with preserved systolic function     Benign prostatic hyperplasia with lower urinary tract symptoms     Essential hypertension with goal blood pressure less than 130/80     Aortic valve sclerosis     History of pulmonary embolism     Non-rheumatic mitral regurgitation     Pancreatic cyst (HCC)     Mitral valve  prolapse     Hypokalemia     Localized osteoarthritis of right shoulder     Pancreatic mass (HCC)     Gross hematuria     Atrial fibrillation with rapid ventricular response (HCC)     Diarrhea, unspecified type     Malignant neoplasm of pancreas, unspecified location of malignancy (HCC)          Past Surgical History[3]  Social Hx on file[4]  History   Drug Use No     Available pre-op labs reviewed.  Lab Results   Component Value Date    WBC 3.6 (L) 04/18/2025    RBC 1.92 (L) 04/18/2025    HGB 5.6 (LL) 04/18/2025    HCT 17.1 (L) 04/18/2025    MCV 89.1 04/18/2025    MCH 29.2 04/18/2025    MCHC 32.7 04/18/2025    RDW 16.0 04/18/2025    PLT 86.0 (L) 04/18/2025     Lab Results   Component Value Date     04/18/2025    K 3.6 04/18/2025     04/18/2025    CO2 23.0 04/18/2025    BUN 31 (H) 04/18/2025    CREATSERUM 1.02 04/18/2025     (H) 04/18/2025    CA 8.8 04/18/2025            Airway      Mallampati: II  Mouth opening: >3 FB  TM distance: > 6 cm  Neck ROM: full Cardiovascular      Rhythm: regular  Rate: normal  (-) murmur   Dental  Comment: Dentition is grossly intact;  Patient does not demonstrate loose teeth to inspection.           Pulmonary  Comment: Unlabored ventilatory effort, no retractions.  Pulmonary exam normal.  Breath sounds clear to auscultation bilaterally.               Other findings              ASA: 3   Plan: MAC  NPO status verified and patient meets guidelines.        Comment: Plan is MAC anesthesia, which may include deep sedation.  Implied that memory of procedure is unlikely although intraop recall, if it occurs, may be a reasonable and comfortable experience with this anesthetic.  Aware that general anesthesia is not intended though necessary care may involve deep sedation with transient moments of general anesthesia.  The backup plan for safe patient care may require change of plan to full general anesthesia with potential airway placement and attendant risks.  Patient (legal  guardian) demonstrated understanding, accepted risks and benefits, and wishes to proceed with MAC anesthesia as reflected in the signed consent form.                    Present on Admission:  **None**             [1]    [COMPLETED] potassium chloride (Klor-Con M20) tab 40 mEq  40 mEq Oral Once    [COMPLETED] cefTRIAXone (Rocephin) 2 g in sodium chloride 0.9% 100 mL IVPB-ADDV  2 g Intravenous Once    acetaminophen (Tylenol Extra Strength) tab 500 mg  500 mg Oral Q4H PRN    ondansetron (Zofran) 4 MG/2ML injection 4 mg  4 mg Intravenous Q6H PRN    metoclopramide (Reglan) 5 mg/mL injection 5 mg  5 mg Intravenous Q8H PRN    pantoprazole (Protonix) 40 mg in sodium chloride 0.9% PF 10 mL IV push  40 mg Intravenous Q12H    sodium chloride 0.9% infusion   Intravenous Continuous    [START ON 4/19/2025] cefTRIAXone (Rocephin) 1 g in sodium chloride 0.9% 100 mL IVPB-ADDV  1 g Intravenous Q24H    finasteride (Proscar) tab 5 mg  5 mg Oral Daily    glucose (Dex4) 15 GM/59ML oral liquid 15 g  15 g Oral Q15 Min PRN    Or    glucose (Glutose) 40% oral gel 15 g  15 g Oral Q15 Min PRN    Or    glucose-vitamin C (Dex-4) chewable tab 4 tablet  4 tablet Oral Q15 Min PRN    Or    dextrose 50% injection 50 mL  50 mL Intravenous Q15 Min PRN    Or    glucose (Dex4) 15 GM/59ML oral liquid 30 g  30 g Oral Q15 Min PRN    Or    glucose (Glutose) 40% oral gel 30 g  30 g Oral Q15 Min PRN    Or    glucose-vitamin C (Dex-4) chewable tab 8 tablet  8 tablet Oral Q15 Min PRN    insulin aspart (NovoLOG) 100 Units/mL FlexPen 1-10 Units  1-10 Units Subcutaneous TID AC and HS    insulin aspart (NovoLOG) 100 Units/mL vial 15 Units  0.2 Units/kg Subcutaneous Once    [COMPLETED] sodium chloride 0.9% infusion   Intravenous Once    potassium chloride (Klor-Con M20) tab 40 mEq  40 mEq Oral Q4H    magnesium oxide (Mag-Ox) tab 400 mg  400 mg Oral Once    [Held by provider] Metoprolol Tartrate TABS 75 mg  75 mg Oral BID    amitriptyline (Elavil) tab 10 mg  10 mg  Oral QPM    [COMPLETED] dilTIAZem 10 mg BOLUS FROM BAG infusion  10 mg Intravenous Once    And    dilTIAZem (cardIZEM) 100 mg in sodium chloride 0.9% 100 mL IVPB-ADDV  2.5-20 mg/hr Intravenous Continuous    [COMPLETED] sodium chloride 0.9 % IV bolus 1,000 mL  1,000 mL Intravenous Once    [COMPLETED] pantoprazole (Protonix) 40 mg in sodium chloride 0.9% PF 10 mL IV push  40 mg Intravenous Once    [COMPLETED] iopamidol 76% (ISOVUE-370) injection for power injector  100 mL Intravenous ONCE PRN    [COMPLETED] metoprolol (Lopressor) 5 mg/5mL injection 5 mg  5 mg Intravenous Once    [COMPLETED] digoxin (Lanoxin) 250 MCG/ML injection 250 mcg  250 mcg Intravenous Once   [2]   Medications Prior to Admission   Medication Sig Dispense Refill Last Dose/Taking    ELIQUIS 5 MG Oral Tab Take 1 tablet (5 mg total) by mouth 2 (two) times daily.   4/17/2025 Morning    aspirin 81 MG Oral Tab EC Take 1 tablet (81 mg total) by mouth daily.   4/17/2025 Morning    ondansetron (ZOFRAN) 8 MG tablet Take 1 tablet (8 mg total) by mouth every 8 (eight) hours as needed for Nausea.   4/17/2025 Morning    Metoprolol Tartrate 75 MG Oral Tab Take 50 mg by mouth in the morning and 50 mg before bedtime.   4/17/2025 Morning    finasteride 5 MG Oral Tab Take 1 tablet (5 mg total) by mouth in the morning.   4/17/2025 Morning    metFORMIN 500 MG Oral Tab Take 2 tablets (1,000 mg total) by mouth daily with breakfast.   4/17/2025 Morning    valsartan 320 MG Oral Tab Take 1 tablet (320 mg total) by mouth daily. 90 tablet 3 4/17/2025 Morning    amitriptyline 10 MG Oral Tab Take 1 tablet (10 mg total) by mouth every evening. 90 tablet 3 4/17/2025 Evening    Cholecalciferol (VITAMIN D3 OR) Take 1 tablet by mouth in the morning.   4/17/2025 Morning    MULTIVITAMINS OR TABS Take 1 tablet by mouth in the morning.   4/17/2025 Morning   [3]   Past Surgical History:  Procedure Laterality Date    Colonoscopy  06/22/2010    POLYP/5YRS, Dr Garcia    Egd and esd -  internal  2017    Esophageal bx benign, pancreatic cyst aspirartion.  Dr Holland    Remove bladder stone,>2.5cm  2025    with TURP    Tonsillectomy      Transurethral elec-surg prostatectom  2025    with bladder stones laser litho    Upper gi endoscopy - referral  2010    Upper gi endoscopy,exam  02/10/2025   [4]   Social History  Socioeconomic History    Marital status:    Tobacco Use    Smoking status: Former     Current packs/day: 0.00     Types: Cigarettes     Quit date: 1958     Years since quittin.3    Smokeless tobacco: Never   Vaping Use    Vaping status: Never Used   Substance and Sexual Activity    Alcohol use: Not Currently     Comment: 2-3/MONTH    Drug use: No    Sexual activity: Yes     Comment:

## 2025-04-18 NOTE — PLAN OF CARE
Pt chief complaint upon admission: SOB & CP. Received pt at 0300 from ED.     A&Ox 4. Strength: WNL, bedrest in mean time d/t Hgb 5.6 and multiple IV gtt. RA, lungs sound clear on auscultation. Afib on tele, HR 100s-130s. Normotensive. Denies any pain. Declined need for pain medication.    GI/: Strict NPO. Continent.  Activity: Bedrest d/t low Hgb and generalized weakness.  LDA: PIV infusing cardizem gtt, IVF, and protonix gtt. Port accessed. Completed 1 UPRBC.   Skin: CDI. Pale.  Incision: NA.    All medications given as ordered. Pt resting in bed w/ all safety measures in place and call light within reach. Patient updated on plan of care, all questions answered. Pt unaware of PTA medications. Attempted to call wife x2 to complete PTA med rec. No answer.     Plan is for formal GI and cardiology consult.     0400 Paged Dr. Recinos to clarify insulin orders missing points above 140 to determine units to give. Clarifying if should give ED order 15 units that was not administered instead. Per Dr. Recinos, give 15 units. NPO so must have close monitoring of BG. Clarified if pt should have repeat troponin, 121 at 2100 and has not been repeated. Added to AM labs.     0627  Paged Dr. Recinos to notify of recheck  this AM. Clarified if should administer AM insulin but pt has been strict NPO. Per Dr. Recinos, hold off on any further insulin since NPO and awaiting procedures. Can wait later this AM for further correction.      Problem: Risk for Violence/Aggression  Goal: Absence of Violence/Aggression  Description: INTERVENTIONS: - Identify precipitating factors for behavior - Notify Charge RN/Provider - Consider decreasing stimulation - Consider distraction measures - Consider discussion with provider regarding prn meds  - Consider JOCELYN (Moderate Risk only) - Consider Code Support (High Risk only) - Consider room safety checks - Consider restraints  Outcome: Progressing     Problem: CARDIOVASCULAR - ADULT  Goal: Maintains  optimal cardiac output and hemodynamic stability  Description: INTERVENTIONS:- Monitor vital signs, rhythm, and trends- Monitor for bleeding, hypotension and signs of decreased cardiac output- Evaluate effectiveness of vasoactive medications to optimize hemodynamic stability- Monitor arterial and/or venous puncture sites for bleeding and/or hematoma- Assess quality of pulses, skin color and temperature- Assess for signs of decreased coronary artery perfusion - ex. Angina- Evaluate fluid balance, assess for edema, trend weights  Outcome: Progressing  Goal: Absence of cardiac arrhythmias or at baseline  Description: INTERVENTIONS:- Continuous cardiac monitoring, monitor vital signs, obtain 12 lead EKG if indicated- Evaluate effectiveness of antiarrhythmic and heart rate control medications as ordered- Initiate emergency measures for life threatening arrhythmias- Monitor electrolytes and administer replacement therapy as ordered  Outcome: Progressing     Problem: PAIN - ADULT  Goal: Verbalizes/displays adequate comfort level or patient's stated pain goal  Description: INTERVENTIONS:- Encourage pt to monitor pain and request assistance- Assess pain using appropriate pain scale- Administer analgesics based on type and severity of pain and evaluate response- Implement non-pharmacological measures as appropriate and evaluate response- Consider cultural and social influences on pain and pain management- Manage/alleviate anxiety- Utilize distraction and/or relaxation techniques- Monitor for opioid side effects- Notify MD/LIP if interventions unsuccessful or patient reports new pain- Anticipate increased pain with activity and pre-medicate as appropriate  Outcome: Progressing     Problem: RESPIRATORY - ADULT  Goal: Achieves optimal ventilation and oxygenation  Description: INTERVENTIONS:- Assess for changes in respiratory status- Assess for changes in mentation and behavior- Position to facilitate oxygenation and minimize  respiratory effort- Oxygen supplementation based on oxygen saturation or ABGs- Provide Smoking Cessation handout, if applicable- Encourage broncho-pulmonary hygiene including cough, deep breathe, Incentive Spirometry- Assess the need for suctioning and perform as needed- Assess and instruct to report SOB or any respiratory difficulty- Respiratory Therapy support as indicated- Manage/alleviate anxiety- Monitor for signs/symptoms of CO2 retention  Outcome: Progressing

## 2025-04-18 NOTE — H&P
[facility] History and Physical      Chief Complaint   Patient presents with    Chest Pain Angina    Fatigue        PCP: Jose Palencia MD      History of Present Illness: Patient is a 84 year old male with PMH sig for pancreatic ca on chemotx (follows with [facility]), PAF on eliquis, hx multiple subsegmental PE, HTN, DM2, GERD p/w weakness and fatigue. Reports last round of chemotherapy was earlier this week and since then he's been feeling weak and tried. Also reporting some diarrhea. Started reporting some chest heaviness last night after dinner. Worsens on deep inspiration. He received iron infusion after chemotherapy. Also reporting black stools.  In the ED HR in 150s (afib/rvr), other VS. Labs with low K and hgb of 5.6, trop 121. UA with wbc's, LE, +glucose but no bacteria. CTA chest with no PE but with small b/l pleural effusions and ascending aorta ectasia 4.4cm. GI and cardiology consulted. Given 1u PRBC and protonix. Eliquis held, not given Kcentra. Did not respond to IV cardizem/metoprolol and then given digoxin IV. Admitted for further evaluation and treatment.      Past Medical History:    Actinic keratoses    Arrhythmia    AFib    Bladder stones    Colon polyp    DEPRESSION    Depression    Diabetes (HCC)    Enlarged prostate with urinary retention    Esophageal reflux    Morocho catheter in place    Gastritis    Gastro-esophageal reflux disease with esophagitis    Glucose intolerance    Hearing impaired person, bilateral    Hiatal hernia    High blood pressure    HYPERTRIGLYCERIDEMIA    Incontinence    Has a urinary cathether, can be incontinent of stool with urinary urgency    LAE (left atrial enlargement)    LAE (left atrial enlargement)    Left ventricular diastolic dysfunction with preserved systolic function    LVH (left ventricular hypertrophy)    Mild ascending aorta dilation    Osteoarthrosis, unspecified whether generalized or localized, unspecified site    Other and unspecified  hyperlipidemia    Pancreas cancer (HCC)    Pancreas cyst (HCC)    bx    Pulmonary embolism (HCC)    Many years ago    RENAL DISEASE    RLS (restless legs syndrome)    Synovial cyst of lumbar facet joint    Unspecified essential hypertension    Vasovagal syncope    Visual impairment    glasses      Past Surgical History[1]     ALL:  Allergies[2]     Prior to Admission Medications[3]    Social History     Tobacco Use    Smoking status: Former     Current packs/day: 0.00     Types: Cigarettes     Quit date: 1958     Years since quittin.3    Smokeless tobacco: Never   Substance Use Topics    Alcohol use: Not Currently     Comment: 2-3/MONTH        Fam Hx  Family History[4]    Review of Systems  Comprehensive ROS reviewed and negative except for what is stated in HPI.      OBJECTIVE:  /54 (BP Location: Left arm)   Pulse 101   Temp 98.4 °F (36.9 °C) (Oral)   Resp 21   Wt 159 lb 2.8 oz (72.2 kg)   SpO2 99%   BMI 24.93 kg/m²   General:  Alert, no distress, appears stated age. Pale   Head:  Normocephalic, without obvious abnormality, atraumatic.   Eyes:  Sclera anicteric, No conjunctival pallor, EOMs intact.    Nose: Nares normal. Septum midline. Mucosa normal. No drainage.   Throat: Lips, mucosa, and tongue normal. Teeth and gums normal.   Neck: Supple, symmetrical, trachea midline, no cervical or supraclavicular lymph adenopathy, thyroid: no enlargment/tenderness/nodules appreciated   Lungs:   Clear to auscultation bilaterally. Normal effort   Chest wall:  No tenderness or deformity.   Heart:  Irregular tachyarrhythmia, S1, S2 normal, no murmur, rub or gallop appreciated   Abdomen:   Soft, non-tender. Bowel sounds normal. No masses,  No organomegaly. Non distended   Extremities: Extremities normal, atraumatic, no cyanosis or edema.   Skin: Skin color, texture, turgor normal. No rashes or lesions.    Neurologic: Normal strength, no focal deficit appreciated     Data Review:    LABS:   Lab Results    Component Value Date    WBC 3.6 04/18/2025    HGB 5.6 04/18/2025    HCT 17.1 04/18/2025    PLT 86.0 04/18/2025    CREATSERUM 1.02 04/18/2025    BUN 31 04/18/2025     04/18/2025    K 3.6 04/18/2025     04/18/2025    CO2 23.0 04/18/2025     04/18/2025    CA 8.8 04/18/2025    ALB 3.3 04/18/2025    ALKPHO 32 04/18/2025    BILT 0.4 04/18/2025    TP 5.3 04/18/2025    AST 33 04/18/2025    ALT 11 04/18/2025    MG 1.6 04/18/2025    PGLU 286 04/18/2025       CXR: All imaging personally reviewed.      Radiology: CTA CHEST (CPT=71275)  Result Date: 4/17/2025  PROCEDURE:  CT ANGIOGRAPHY, CHEST (CPT=71275)  COMPARISON:  [facility] , CT, CT ANGIOGRAPHY, CHEST (CPT=71275), 3/20/2015, 0:21 AM.  INDICATIONS:  afibrvr, WEAKNESS, DIZZY,, PANCREATIC CA  TECHNIQUE:  IV contrast-enhanced multislice CT angiography is performed through the pulmonary arterial anatomy. 3D volume renderings are generated.  Dose reduction techniques were used. Dose information is transmitted to the ACR (American College of Radiology) NRDR (National Radiology Data Registry) which includes the Dose Index Registry.  PATIENT STATED HISTORY:(As transcribed by Technologist)  c/o fatigue, dizziness and fatigue since chemo and iron infusion on Monday, hx of Pancreatic CA. Hx of afib on Eliquis   CONTRAST USED:  100cc of Isovue 370  FINDINGS:  LUNGS:  Mild scattered atelectasis/scarring.  No lobar consolidation.  Large airways are unremarkable.  Minimal biapical pleural-parenchymal scarring. VASCULATURE:    No CT evidence of acute pulmonary embolism. THORACIC AORTA:    Mild mixed plaque.  No acute dissection.  Ectasia of the ascending aorta measuring up to 4.4 cm in diameter can be further assessed with gated CTA of the thoracic aorta as clinically directed. MEDIASTINUM/GINI:    Unremarkable CARDIAC:    Moderate to extensive coronary artery calcifications.  Minimal fluid in the pericardial recesses. PLEURA:    Small bilateral pleural effusions. CHEST  WALL:    Nonspecific soft tissue edema.  Left chest port.  LIMITED ABDOMEN:    Subcentimeter hypodensities in the partially imaged liver measuring up to 9 mm are too small to further characterize and warrant no specific follow-up. BONES:    Degenerative changes in the spine.  Flowing osteophytes may be due to DISH. OTHER:    None            CONCLUSION:   1. No CT evidence of acute pulmonary embolism or acute aortic dissection.  2. Ectasia of the ascending aorta measuring up to 4.4 cm in diameter can be further assessed with gated CTA of the thoracic aorta as clinically directed.  3. Small bilateral pleural effusions.  Please see above for further details.  LOCATION:  [facility]   Dictated by (CST): Uriah Rodriguez MD on 4/17/2025 at 11:12 PM     Finalized by (CST): Uriah Rodriguez MD on 4/17/2025 at 11:15 PM       XR CHEST AP PORTABLE  (CPT=71045)  Result Date: 4/17/2025  PROCEDURE:  XR CHEST AP PORTABLE  (CPT=71045)  TECHNIQUE:  AP chest radiograph was obtained.  COMPARISON:  [facility] , NATASHA, XR CHEST AP PORTABLE  (CPT=71010), 3/19/2015, 10:30 PM.  INDICATIONS:  afibrvr, WEAKNESS, DIZZY,, PANCREATIC CA  PATIENT STATED HISTORY: (As transcribed by Technologist)  afib, weakness    FINDINGS:  CT compatible left chest port tip in the upper SVC.  Cardiomegaly with normal pulmonary vascularity.  No pleural effusion or pneumothorax.  Degenerative changes the spine.            CONCLUSION:  No active cardiopulmonary process identified.   LOCATION:  [facility]      Dictated by (CST): Uriah Rodriguez MD on 4/17/2025 at 10:24 PM     Finalized by (CST): Uriah Rodriguez MD on 4/17/2025 at 10:24 PM          Assessment/Plan:     84 year old male with PMH sig for pancreatic ca on chemotx (follows with [facility]), PAF on eliquis, hx multiple subsegmental PE, HTN, DM2, GERD p/w weakness and fatigue.    Acute on chronic Anemia with black stools  - acute posthemorrhagic blood loss (GIB?) vs. Post-chemotherapy suppression   - last Hgb in system  on 3/6 was 10.2  [facility]  - s/p 1u PRBC in ED  - hold PTA eliquis  - order 2u PRBC this AM, repeat post  - GI consulted, anticipate endoscopy, timing TBD  - NPO, IVF  - PPI BID    Paroxysmal atrial fibrillation with RVR  - triggered by anemia, hypovolemia  - diltiazem infusion, s/p IV digoxin in ED  - resume PTA metoprolol 75 BID  - cardiology following  - telemetry monitoring     Elevated troponin  - demand ischemia in setting of anemia, RVR  - should improve with treatment    ?UTI  - hx BPH, Urinary retention, follows [facility], had bender removed on 3/18  - low suspicion for UTI  - will plan CTX for at least 3 days, await cultures  - finasteride     Pancreatic cancer  - follows [facility]  - chart not accessible for complete information, will request consent and submit after, d/w RN  - last chemo 3 days ago    Mod-sev Mitral Regurg/MVP  - monitor   - cardiology following  - OP structural evaluation    Hx PE  - no PE on CTA chest 4/17 in ED    Essential HTN  - metoprolol  - hold PTA valsartan to avoid low BP    DM2  - ISS/accucheks    GERD  - ppi     FEN: NPO, PT/OT  Proph: SCDs  Code status: Full code    Critical care time >35 mins     Outpatient records or previous hospital records reviewed.   [facility] hospitalist to continue to follow patient while in house      Chuck Collins MD  [facility]  Hospitalist  Message over [employer]  Pager: [phone]        **Certification      PHYSICIAN Certification of Need for Inpatient Hospitalization - Initial Certification    Patient will require inpatient services that will reasonably be expected to span two midnight's based on the clinical documentation in H+P.   Based on patients current state of illness, I anticipate that, after discharge, patient will require TBD.         [1]   Past Surgical History:  Procedure Laterality Date    Colonoscopy  06/22/2010    POLYP/5YRS, Dr Garcia     Egd and esd - internal  12/11/2017    Esophageal bx benign, pancreatic cyst aspirartion.  Dr Holland    Remove bladder stone,>2.5cm  02/20/2025    with TURP    Tonsillectomy      Transurethral elec-surg prostatectom  02/20/2025    with bladder stones laser litho    Upper gi endoscopy - referral  06/22/2010    Upper gi endoscopy,exam  02/10/2025   [2]   Allergies  Allergen Reactions    Pcn [Penicillins]      \" FAINTED\"   [3]   No current outpatient medications on file.   [4]   Family History  Problem Relation Age of Onset    Cancer Father         esophageal    Cancer Mother         breast

## 2025-04-18 NOTE — ED INITIAL ASSESSMENT (HPI)
Arrives via EMS from home with Afib RVR, c/o fatigue, dizziness and fatigue since chemo and iron infusion on Monday, hx of Pancreatic CA. Hx of afib on Eliquis

## 2025-04-18 NOTE — OPERATIVE REPORT
EGD Operative Report    Shaq Song Patient Status:  Inpatient    10/2/1940 MRN [id number]   [facility] Attending Carlos Collins*   Hosp Day #   0 PCP Jose Palencia MD       Pre-op Diagnosis: GI BLEED     Post-Op Diagnosis:    ESOPHAGUS:  normal   STOMACH:  In gastric fundus and proximal body there is bleeding from avm vs neovascularization arising from what is concerning for extrinsic tumor eroding into stomach. This was injected with 3 ml epinephrine and apc. Continued bleeding and two clips placed. Continue bleeding and hemospray x 2 used. No bleeding at end of procedure.   DUODENUM:  normal, bile    Procedure Performed: Procedure(s):  ESOPHAGOGASTRODUODENOSCOPY (EGD) with epinephrine injection, clip placement, apc, hemospray    Informed Consent: Informed consent for both the procedure and sedation were obtained from the patient. The potentially life-threatening complications of sedation, bleeding,  Perforation, transfusion or repeat endoscopy were reviewed along with the possible need for hospitalization, surgical management, transfusion or repeat endoscopy should one of these complications arise. The patient understands and is agreeable to proceed.  Sedation Type: MAC-Patient received sedation with monitored anesthesia provided by an anesthesiologist    Procedure Description: The patient was placed in the left lateral decubitus position.  A bite block was placed in the patient’s mouth.  The endoscope was inserted through the mouth and advanced under direct visualization to the descending duodenum and was then withdrawn to examine the duodenal bulb and gastric antrum.  The endoscope was then retroflexed to examine the angulus, GE junction, cardia, body and fundus and then withdrawn to examine the esophagus. The  endoscope was then removed from the patient. The patient tolerated the procedure well with no immediate complications and was transferred to the recovery area in stable condition.  Findings:    ESOPHAGUS:  normal   STOMACH:  In gastric fundus and proximal body there is bleeding from avm vs neovascularization arising from what is concerning for extrinsic tumor eroding into stomach. This was injected with 3 ml epinephrine and apc. Continued bleeding and two clips placed. Continue bleeding and hemospray x 2 used. No bleeding at end of procedure.   DUODENUM:  normal, bile  Recommendations:   - hold eliquis for now  - monitor hgb with goal 7  - pantoprazole twice daily  - if recurrent bleed cta abdomen  - clear liquid diet  - outpatient ct abdomen to evaluate for progression of disease, would hold off because if signs of rebleed want cta and want to avoid two contrast loads    Discharge:  The patient was given an after visit summary detailing the procedure, findings, recommendations and follow up plans.  Abner Woods MD  4/18/2025  12:32 PM

## 2025-04-18 NOTE — ED QUICK NOTES
Orders for admission, patient is aware of plan and ready to go upstairs. Any questions, please call ED RN yaquelin at extension [phone].     Patient Covid vaccination status: Fully vaccinated     COVID Test Ordered in ED: None    COVID Suspicion at Admission: N/A    Running Infusions: Medication Infusions[1]     Mental Status/LOC at time of transport: a&o x4    Other pertinent information: denies using ambulatory devices at home  CIWA score: N/A   NIH score:  N/A             [1]    sodium chloride      dilTIAZem 10 mg/hr (04/17/25 2310)    pantoprazole (Protonix) 80 mg in sodium chloride 0.9% 100 mL infusion 8 mg/hr (04/18/25 0005)

## 2025-04-18 NOTE — DIETARY NOTE
[facility]   part of [facility]    NUTRITION ASSESSMENT    Pt meets moderate malnutrition criteria at this time.    CRITERIA FOR MALNUTRITION DIAGNOSIS:  Criteria for non-severe malnutrition diagnosis: chronic illness related to wt loss 7.5% in 3 months and energy intake less than75% for greater than 1 month      NUTRITION INTERVENTION:    RD nutrition Care Plan- See RD nutrition assessment for additional recommendations  Meal and Snacks - Diet liberalized as able. ADAT. Monitor and encourage adequate PO intake.   Medical Food Supplements -  ensure Max BID .     PATIENT STATUS: 02/21/25 84 year old male admitted 4/17  with weakness, black stool and fatigue, of note last round of chemotherapy for pancreatic ca was earlier this week Chart reviewed for +MST. Currently at EGD. Significant wt loss noted past 3 months. Skin intact. RD to follow    PMH sig for DM, GERD/gastritis, hyperlipidemia, hypertension, PE, BPH, chronic urinary retention    ANTHROPOMETRICS:  Ht: 170.2 cm (5' 7\")  Wt: 72.2 kg (159 lb 2.8 oz).   BMI: Body mass index is 24.93 kg/m².  IBW: 67.3 kg      WEIGHT HISTORY:   Weight loss: 48# (23%) x 3 month, sig change    Wt Readings from Last 10 Encounters:   04/18/25 72.2 kg (159 lb 2.8 oz)   03/08/25 73.5 kg (162 lb)   02/27/25 74.8 kg (165 lb)   02/20/25 74.8 kg (164 lb 12.8 oz)   02/10/25 75.8 kg (167 lb)   02/06/25 77.1 kg (170 lb)   01/19/25 94 kg (207 lb 3.7 oz)   01/26/22 93 kg (205 lb)   04/12/21 93.9 kg (207 lb)   11/18/20 92.4 kg (203 lb 9.6 oz)        NUTRITION:  Diet:       Procedures    Clear liquid diet Is Patient on Accuchecks? Yes      Food Allergies: No  Cultural/Ethnic/Moravian Preferences Addressed: Yes    Percent Meals Eaten (last 3 days)       None            GI system review: WNL Last BM Date: 04/17/25  Skin and wounds: intact    NUTRITION RELATED PHYSICAL FINDINGS:     1. Body Fat/Muscle Mass: mild muscle depletion Temple region (2/21/25)     2. Fluid Accumulation: none per  RN documentation    NUTRITION PRESCRIPTION:  74.8 kg  Actual Body Weight  Calories: 9823-1268 calories/day (25-30 kcal/kg)  Protein:  grams protein/day (1.0-1.5 grams protein per kg)  Fluid: ~1 ml/kcal or per MD discretion    NUTRITION DIAGNOSIS/PROBLEM:  Malnutrition related to insufficient appetite resulting in inadequate nutrition intake as evidenced by documented/reported insufficient oral intake, documented/reported unintentional weight loss, and loss of muscle mass      MONITOR AND EVALUATE/NUTRITION GOALS:  PO intake of 75% of meals TID - New  PO intake of 75% of oral nutrition supplement/s - New  Weight stable within 1 to 2 lbs during admission - New      MEDICATIONS:  Reviewed    LABS:  Glu 267, A1c 6.9    Pt is at Moderate nutrition risk    Elli Estrada RD, LDN  Clinical Nutrition  [id number]

## 2025-04-18 NOTE — H&P
[facility]   part of [facility]    History & Physical    Shaq Song Patient Status:  Inpatient    10/2/1940 MRN [id number]   Location [facility] Attending Carlos Collins*   Hosp Day # 0 PCP Jose Palencia MD     Date:  2025  Date of Admission:  2025    History provided by:patient  Chief Complaint:     anemia, melena    HPI:   Shaq Song is a(n) 84 year old male. Here for anemia, melena    History   Past Medical History[1]  Past Surgical History[2]  Family History[3]  Social History:  Short Social Hx on File[4]  Allergies/Medications:   Allergies: Allergies[5]  Prescriptions Prior to Admission[6]    Review of Systems:   Pertinent items are noted in HPI.  A comprehensive review of systems was negative.    Physical Exam:   Vital Signs:  Blood pressure 131/63, pulse 104, temperature 98.4 °F (36.9 °C), temperature source Oral, resp. rate 15, weight 159 lb 2.8 oz (72.2 kg), SpO2 100%.     General appearance:  alert, appears stated age, and cooperative  Head: Normocephalic, without obvious abnormality, atraumatic  Pulmonary: clear to auscultation bilaterally  Cardiovascular: S1, S2 normal, no murmur, click, rub or gallop, regular rate and rhythm  Abdominal: soft, non-tender; bowel sounds normal; no masses,  no organomegaly  Extremities: extremities normal, atraumatic, no cyanosis or edema        Results:     Lab Results   Component Value Date    WBC 3.6 (L) 2025    HGB 5.6 (LL) 2025    HCT 17.1 (L) 2025    PLT 86.0 (L) 2025    CREATSERUM 1.02 2025    BUN 31 (H) 2025     2025    K 3.6 2025     2025    CO2 23.0 2025     (H) 2025    CA 8.8 2025    ALB 3.3 2025    ALKPHO 32 (L) 2025    BILT 0.4 2025    TP 5.3 (L) 2025    AST 33 2025    ALT 11 2025    PTT 31.2 2017    INR 1.04 2017    TSH 1.820 2025    LIP 36  02/06/2025    PSA 4.5 (H) 03/07/2016    MG 1.6 04/18/2025    TROP <0.046 04/06/2017       CTA CHEST (CPT=71275)  Result Date: 4/17/2025  CONCLUSION:   1. No CT evidence of acute pulmonary embolism or acute aortic dissection.  2. Ectasia of the ascending aorta measuring up to 4.4 cm in diameter can be further assessed with gated CTA of the thoracic aorta as clinically directed.  3. Small bilateral pleural effusions.  Please see above for further details.  LOCATION:  [facility]   Dictated by (CST): Uriah Rodriguez MD on 4/17/2025 at 11:12 PM     Finalized by (CST): Uriah Rodriguez MD on 4/17/2025 at 11:15 PM       XR CHEST AP PORTABLE  (CPT=71045)  Result Date: 4/17/2025  CONCLUSION:  No active cardiopulmonary process identified.   LOCATION:  [facility]      Dictated by (CST): Uriah Rodriguez MD on 4/17/2025 at 10:24 PM     Finalized by (CST): Uriah Rodriguez MD on 4/17/2025 at 10:24 PM       EKG  Result Date: 4/18/2025  Atrial fibrillation with rapid ventricular response Minimal voltage criteria for LVH, may be normal variant ( Sokolow-Alvarado ) Marked ST abnormality, possible anterolateral subendocardial injury Abnormal ECG When compared with ECG of 06-APR-2017 19:55, Atrial fibrillation has replaced Sinus rhythm Vent. rate has increased BY  84 BPM ST now depressed in Anterior-lateral leads Nonspecific T wave abnormality now evident in Inferior leads      Assessment/Plan:     egd        Abner Woods MD  4/18/2025         [1]   Past Medical History:   Actinic keratoses    Arrhythmia    AFib    Bladder stones    Colon polyp    DEPRESSION    Depression    Diabetes (HCC)    Enlarged prostate with urinary retention    Esophageal reflux    Morocho catheter in place    Gastritis    Gastro-esophageal reflux disease with esophagitis    Glucose intolerance    Hearing impaired person, bilateral    Hiatal hernia    High blood pressure    HYPERTRIGLYCERIDEMIA    Incontinence    Has a urinary cathether, can be incontinent of stool with  urinary urgency    LAE (left atrial enlargement)    LAE (left atrial enlargement)    Left ventricular diastolic dysfunction with preserved systolic function    LVH (left ventricular hypertrophy)    Mild ascending aorta dilation    Osteoarthrosis, unspecified whether generalized or localized, unspecified site    Other and unspecified hyperlipidemia    Pancreas cancer (HCC)    Pancreas cyst (HCC)    bx    Pulmonary embolism (HCC)    Many years ago    RENAL DISEASE    RLS (restless legs syndrome)    Synovial cyst of lumbar facet joint    Unspecified essential hypertension    Vasovagal syncope    Visual impairment    glasses   [2]   Past Surgical History:  Procedure Laterality Date    Colonoscopy  2010    POLYP/5YRS, Dr Garcia    Egd and esd - internal  2017    Esophageal bx benign, pancreatic cyst aspirartion.  Dr Holland    Remove bladder stone,>2.5cm  2025    with TURP    Tonsillectomy      Transurethral elec-surg prostatectom  2025    with bladder stones laser litho    Upper gi endoscopy - referral  2010    Upper gi endoscopy,exam  02/10/2025   [3]   Family History  Problem Relation Age of Onset    Cancer Father         esophageal    Cancer Mother         breast   [4]   Social History  Socioeconomic History    Marital status:    Tobacco Use    Smoking status: Former     Current packs/day: 0.00     Types: Cigarettes     Quit date: 1958     Years since quittin.3    Smokeless tobacco: Never   Vaping Use    Vaping status: Never Used   Substance and Sexual Activity    Alcohol use: Not Currently     Comment: 2-3/MONTH    Drug use: No    Sexual activity: Yes     Comment:      Social Drivers of Health     Food Insecurity: No Food Insecurity (2025)    NCSS - Food Insecurity     Worried About Running Out of Food in the Last Year: No     Ran Out of Food in the Last Year: No   Transportation Needs: No Transportation Needs (2025)    NCSS - Transportation     Lack of  Transportation: No   Housing Stability: Not At Risk (4/18/2025)    NCSS - Housing/Utilities     Has Housing: Yes     Worried About Losing Housing: No     Unable to Get Utilities: No   [5]   Allergies  Allergen Reactions    Pcn [Penicillins]      \" FAINTED\"   [6]   Medications Prior to Admission   Medication Sig    ELIQUIS 5 MG Oral Tab Take 1 tablet (5 mg total) by mouth 2 (two) times daily.    aspirin 81 MG Oral Tab EC Take 1 tablet (81 mg total) by mouth daily.    ondansetron (ZOFRAN) 8 MG tablet Take 1 tablet (8 mg total) by mouth every 8 (eight) hours as needed for Nausea.    Metoprolol Tartrate 75 MG Oral Tab Take 50 mg by mouth in the morning and 50 mg before bedtime.    finasteride 5 MG Oral Tab Take 1 tablet (5 mg total) by mouth in the morning.    metFORMIN 500 MG Oral Tab Take 2 tablets (1,000 mg total) by mouth daily with breakfast.    valsartan 320 MG Oral Tab Take 1 tablet (320 mg total) by mouth daily.    amitriptyline 10 MG Oral Tab Take 1 tablet (10 mg total) by mouth every evening.    Cholecalciferol (VITAMIN D3 OR) Take 1 tablet by mouth in the morning.    MULTIVITAMINS OR TABS Take 1 tablet by mouth in the morning.

## 2025-04-18 NOTE — HISTORICAL OFFICE NOTE
Continuity of Care Document  1/6/2025  Shaq Song - 84 y.o. Male; born [birth year]  Summary of episode note  , generated on Jan. 06, 2025January 06, 2025   CHIEF COMPLAINT    CHIEF COMPLAINT  Reason for Visit/Chief Complaint   consult, pt has been c/o palpitations and elevated BP readings   84-year-old male with chronic medical conditions including hypertension, hyperlipidemia, DM2, Hx mitral valve prolapse with moderate to severe regurgitation, remote Hx DVT. Patient presents after noting that he has been having palpitations, lightheadedness, near syncope since Wednesday last week. Patient notes that he has had intermittent episodes and felt the worst on Friday. He noticed that his heart rate was elevated and he felt like his heart was pumping rapidly and irregularly and believed he was also quite lightheaded during the time. Patient did not follow-up with structural valve clinic previously after he had a VANESSA in 2020. Overall, states he has been compliant with medications and has been doing well. Denies chest pain, nausea, emesis, fevers, chills.     PROBLEMS  Reconcile with Patient's ChartPROBLEMS  Problem Effective Dates Date resolved Problem Status   Atherosclerosis of abdominal aorta, [SNOMED-CT: 979464677] 2/10/2024 - Active   Controlled type 2 diabetes mellitus without complication, without long-term current use of insulin, [SNOMED-CT: 286988247] 9/28/2023 - Active   Mitral valve prolapse, [SNOMED-CT: 502505665] 7/7/2020 - Active   Non-rheumatic mitral regurgitation, [SNOMED-CT: 864843340] 7/7/2020 - Active   Thoracic ascending aortic aneurysm, [SNOMED-CT: 500782185] 7/7/2020 - Active   Benign prostatic hyperplasia with lower urinary tract symptoms, [SNOMED-CT: 375485195] 3/29/2019 - Active   Aortic valve sclerosis, [SNOMED-CT: 56532263] 3/28/2018 - Active   History of pulmonary embolism, [SNOMED-CT: 179664238] 3/28/2018 - Active   Essential hypertension with goal blood pressure less  than 130/80, [SNOMED-CT: 52078661] 3/20/2017 - Active   Pure hypercholesterolemia, [SNOMED-CT: 480045573] 3/14/2016 - Active   LAE (left atrial enlargement), [SNOMED-CT: 64015830331825] 3/13/2015 - Active   Left ventricular diastolic dysfunction with preserved systolic function, [SNOMED-CT: 447382341] 3/13/2015 - Active   Heart murmur, [SNOMED-CT: 45145414] 11/6/2014 - Active   Glucose intolerance (impaired glucose tolerance), [SNOMED-CT: 3012624] 1/13/2011 - Active   Pure hyperglyceridemia, [SNOMED-CT: 540789426] 1/13/2011 - Active     ENCOUNTER    ENCOUNTER  Problem Effective Dates Date resolved Problem Status   Atrial fibrillation, new onset, [SNOMED-CT: 62701822] 1/6/2025 - Active   Controlled type 2 diabetes mellitus without complication, without long-term current use of insulin, [SNOMED-CT: 269818049] 9/28/2023 - Active   Mitral valve prolapse, [SNOMED-CT: 358263564] 7/7/2020 - Active   Non-rheumatic mitral regurgitation, [SNOMED-CT: 777269742] 7/7/2020 - Active   Thoracic ascending aortic aneurysm, [SNOMED-CT: 426905596] 7/7/2020 - Active   Essential hypertension with goal blood pressure less than 130/80, [SNOMED-CT: 47914094] 3/20/2017 - Active   Pure hypercholesterolemia, [SNOMED-CT: 005689099] 3/14/2016 - Active   Heart murmur, [SNOMED-CT: 34238887] 11/6/2014 - Active     VITAL SIGNS    VITAL SIGNS  Date / Time: 1/6/2025   BP Systolic 128 mmHg   BP Diastolic 68 mmHg   Height 67 inches   Weight 186 lbs   Pulse Rate 86 bpm   BSA (Body Surface Area) 2 cc/m2   BMI (Body Mass Index) 29.1 cc/m2   Blood Pressure 128 / 68 mmHg     PHYSICAL EXAMINATION    PHYSICAL EXAMINATION  Header Details   Vitals Right Arm Sitting  / 68 mmHg, Pulse rate 86 bpm, Height in 5' 7\", BMI: 29.1, Weight in 186 lbs (or) 84.37 kgs, BSA : 2.02 cc/m²   General Appearance No Acute Distress   Cardiovascular      ALLERGIES, ADVERSE REACTIONS, ALERTS  Reconcile with Patient's ChartNo data available    MEDICATIONS ADMINISTERED DURING  VISIT    No data available    MEDICATIONS  Reconcile with Patient's ChartMEDICATIONS  Medication Start Date Route/Frequency Status   AMITRIPTYLINE 10 MG Oral Tab, [RxNorm: 349424] 1/3/2025 TAKE ONE TABLET BY MOUTH EVERY EVENING Active   AMLODIPINE 5 MG Oral Tab, [RxNorm: 700196] 1/3/2025 TAKE 1 TABLET BY MOUTH DAILY Active   aspirin 81 MG Oral Tab, [RxNorm: 570694] 1/3/2025 Take 81 mg by mouth daily. Active   DOXAZOSIN 4 MG Oral Tab, [RxNorm: 352448] 1/3/2025 TAKE ONE TABLET BY MOUTH EVERY EVENING Active   Eliquis 5 mg tablet, [RxNorm: 0007129] 1/6/2025 Take 1 tablet orally 2 times a day. Active   metFORMIN 500 mg tablet, [RxNorm: 052701] 1/6/2025 Take 1 tablet orally 2 times a day. Active   metoprolol tartrate 50 mg tablet, [RxNorm: 795536] 1/6/2025 Take 1 tablet orally 2 times a day. Active   MULTIVITAMINS OR TABS, [RxNorm: 0] 1/3/2025 Take 1 tablet by mouth daily. Active   valsartan 320 MG Oral Tab, [RxNorm: 982871] 1/3/2025 Take 1 tablet (320 mg total) by mouth daily. Active   VITAMIN D3 1000 UNIT OR CAPS, [RxNorm: 035543] 1/3/2025 Take 1 tablet by mouth daily. Active     ASSESSMENT    Assessment  - Moderate to severe mitral regurgitation, mitral valve prolapse with partial flail of P2 scallop  - Newly diagnosed atrial fibrillation, rate controlled  - Ascending aorta dilation  - Hypertension  - Hyperlipidemia  - DM 2  - Remote Hx provoked DVTPlan  - Given elevated [id number] score, recommend initiation of Eliquis 5 mg twice daily.  - Discontinue atenolol-chlorthalidone. Start metoprolol tartrate to 50 mg twice daily.  - MCT 7 days  - Complete echo  - Check CBC, CMP, TSH, lipid panel  - Recommended that if patient has further episodes of palpitations associated with lightheadedness, dizziness, near-syncope, recommend prompt ER evaluation.  - Follow-up when testing is complete     FAMILY HISTORY    FAMILY HISTORY  Relationship Age Diagnosis   Father 0 Hypertension (HTN), primary; CHF (congestive heart failure),  chronic, combined systolic CHF (congestive heart failure), chronic, combined systolic   Mother 0 Hypertension (HTN), primary   Denies premature CAD Hx, sudden cardiac death.     GENERAL STATUS    No data available    PAST MEDICAL HISTORY    PAST MEDICAL HISTORY  Problem Date diagonsed Date resolved Status   Atherosclerosis of abdominal aorta, [SNOMED-CT: 883467960] 2/10/2024 - Active   Controlled type 2 diabetes mellitus without complication, without long-term current use of insulin, [SNOMED-CT: 676195460] 9/28/2023 - Active   Mitral valve prolapse, [SNOMED-CT: 100617019] 7/7/2020 - Active   Non-rheumatic mitral regurgitation, [SNOMED-CT: 309732530] 7/7/2020 - Active   Thoracic ascending aortic aneurysm, [SNOMED-CT: 180342910] 7/7/2020 - Active   Benign prostatic hyperplasia with lower urinary tract symptoms, [SNOMED-CT: 984028658] 3/29/2019 - Active   Aortic valve sclerosis, [SNOMED-CT: 46491596] 3/28/2018 - Active   History of pulmonary embolism, [SNOMED-CT: 896506523] 3/28/2018 - Active   Essential hypertension with goal blood pressure less than 130/80, [SNOMED-CT: 96738955] 3/20/2017 - Active   Pure hypercholesterolemia, [SNOMED-CT: 148016410] 3/14/2016 - Active   LAE (left atrial enlargement), [SNOMED-CT: 04004481191494] 3/13/2015 - Active   Left ventricular diastolic dysfunction with preserved systolic function, [SNOMED-CT: 715570897] 3/13/2015 - Active   Heart murmur, [SNOMED-CT: 28777315] 11/6/2014 - Active   Glucose intolerance (impaired glucose tolerance), [SNOMED-CT: 6599649] 1/13/2011 - Active   Pure hyperglyceridemia, [SNOMED-CT: 550683561] 1/13/2011 - Active     HISTORY OF PRESENT ILLNESS    84-year-old male with chronic medical conditions including hypertension, hyperlipidemia, DM2, Hx mitral valve prolapse with moderate to severe regurgitation, remote Hx DVT. Patient presents after noting that he has been having palpitations, lightheadedness, near syncope since Wednesday last week. Patient notes that  he has had intermittent episodes and felt the worst on Friday. He noticed that his heart rate was elevated and he felt like his heart was pumping rapidly and irregularly and believed he was also quite lightheaded during the time. Patient did not follow-up with structural valve clinic previously after he had a VANESSA in 2020. Overall, states he has been compliant with medications and has been doing well. Denies chest pain, nausea, emesis, fevers, chills.     IMMUNIZATIONS  Reconcile with Patient's ChartNo data available    PLAN OF CARE    PLAN OF CARE  Planned Care Date   EKG (electrocardiogram) 1/1/1900   Monitor - MCT/Telemetry 1/1/1900   Echocardiography - Complete 1/1/1900   CMP 1/1/1900   Lipid panel, Fasting 1/1/1900   TSH (Thyroid Stimulating Hormone) Panel 1/1/1900   CBC (Complete Blood Count) 1/1/1900   Take 1 tablet orally 2 times a day.-Eliquis 5 mg tablet 1/6/2025   Take 1 tablet orally 2 times a day.-metoprolol tartrate 50 mg tablet 1/6/2025   Referral Visit - Alis Deglado [email or web address]) : 1/1/1900   Follow up visit - Teo Acosta DO 1/1/1900     PROCEDURES    No data available    RESULTS    RESULTS  Name Result Date Location - Ordered By   CMP [LOINC: 08803-0] Pending Schedule next available     Lipid panel, Fasting [LOINC: 40155-6] Pending Schedule next available     TSH (Thyroid Stimulating Hormone) Panel [LOINC: 29665-0] Pending Schedule next available     CBC (Complete Blood Count) [LOINC: 48929-2] Pending Schedule next available       REVIEW OF SYSTEMS    REVIEW OF SYSTEMS  Header Details   Cardiovascular Palpitations  No history of Chest pain, JONES, Syncope, PND, Orthopnea, Edema, Claudication     SOCIAL HISTORY    SOCIAL HISTORY  Social History Element Description Effective Dates   Smoking status Former smoker -     FUNCTIONAL STATUS    No data available    MEDICAL EQUIPMENT    No data available    Goals Sections    No data available    REASON FOR REFERRAL          Health Concerns Section    No data available    COGNITIVE/MENTAL STATUS    No data available    Patient Demographics    Patient Demographics  Patient Address Patient Name Communication   [address] [place] [address] Shaq Song [phone] (Mobile)     Patient Demographics  Language Race / Ethnicity Marital Status   English - Spoken (Preferred) White / Not  or       Document Information    Primary Care Provider Other Service Providers Document Coverage Dates   Teo Acosta  NPI: [id number]  [phone] (Work)  [address] [place] [address]  [place] [address]  Interpreting Physicians  [facility]  [phone] (Work)  [address] [place] [address] Nancy JAXON Yanes  NPI: [id number]  [phone] (Work)  [address] [place] [address]  [place] [address]  Nurses     Hope UNK Boness  NPI: [id number]  [phone] (Work)  [address] [place] [address]  [place] [address]  Nurses Jan. 06, 2025January 06, 2025      Organization   [facility]  [phone] (Work)  [address] [place] [address]  [place] [address]     Encounter Providers Encounter Date    Jan. 06, 2025January 06, 2025       Legal Authenticator    Teo Baker  NPI: [id number]  [phone] (Work)  [address] [place] [address]  [place] [address]

## 2025-04-18 NOTE — ANESTHESIA POSTPROCEDURE EVALUATION
[facility]    Shaq Song Patient Status:  Inpatient   Age/Gender 84 year old male MRN [id number]   Location [facility] ENDOSCOPY [facility] Attending Carlos Collins*   Hosp Day # 0 PCP Jose Palencia MD       Anesthesia Post-op Note    ESOPHAGOGASTRODUODENOSCOPY (EGD) WITH ARGON PLASMA COAGULATION AND CLIP PLACEMNT X 3 AND SUBMUCOSAL EPINEPHRINE INJECTION AND HEMOSPRAY    Procedure Summary       Date: 04/18/25 Room / Location:  ENDOSCOPY 02 / [facility]    Anesthesia Start: 1236 Anesthesia Stop: 1324    Procedure: ESOPHAGOGASTRODUODENOSCOPY (EGD) WITH ARGON PLASMA COAGULATION AND CLIP PLACEMNT X 3 AND SUBMUCOSAL EPINEPHRINE INJECTION AND HEMOSPRAY Diagnosis: (INVASIVE TUMOR BLEEDING)    Surgeons: Abner Woods MD Anesthesiologist: Reese Freire MD    Anesthesia Type: MAC ASA Status: 3            Anesthesia Type: MAC    Vitals Value Taken Time   /61 04/18/25 13:24   Temp  04/18/25 13:27   Pulse 97 04/18/25 13:24   Resp 18 04/18/25 13:24   SpO2 0 % 04/18/25 13:24           Patient Location: Endoscopy    Anesthesia Type: MAC    Airway Patency: patent    Postop Pain Control: adequate    Mental Status: mildly sedated but able to meaningfully participate in the post-anesthesia evaluation    Nausea/Vomiting: none    Cardiopulmonary/Hydration status: stable euvolemic    Complications: no apparent anesthesia related complications    Postop vital signs: Other: (remains tachycardic at intervals.)    Comments: The patient was responsive in a meaningful way and demonstrated a good airway.  Full report, identifications, history, procedure, anesthesia course, recovery expectations with chance for questions was provided to a responsible recovery RN from the endoscopy staff.        Dental Exam: Unchanged from Preop    Patient to be discharged from PACU when criteria met.

## 2025-04-19 ENCOUNTER — APPOINTMENT (OUTPATIENT)
Dept: CV DIAGNOSTICS | Facility: HOSPITAL | Age: 85
End: 2025-04-19
Attending: PHYSICIAN ASSISTANT
Payer: MEDICARE

## 2025-04-19 LAB
ALBUMIN SERPL-MCNC: 3.4 G/DL (ref 3.2–4.8)
ALBUMIN/GLOB SERPL: 1.8 {RATIO} (ref 1–2)
ALP LIVER SERPL-CCNC: 38 U/L (ref 45–117)
ALT SERPL-CCNC: 12 U/L (ref 10–49)
ANION GAP SERPL CALC-SCNC: 10 MMOL/L (ref 0–18)
AST SERPL-CCNC: 34 U/L (ref ?–34)
BASOPHILS # BLD AUTO: 0.03 X10(3) UL (ref 0–0.2)
BASOPHILS NFR BLD AUTO: 0.8 %
BILIRUB SERPL-MCNC: 0.9 MG/DL (ref 0.2–1.1)
BLOOD TYPE BARCODE: 6200
BLOOD TYPE BARCODE: 6200
BUN BLD-MCNC: 17 MG/DL (ref 9–23)
CALCIUM BLD-MCNC: 8.6 MG/DL (ref 8.7–10.6)
CHLORIDE SERPL-SCNC: 111 MMOL/L (ref 98–112)
CO2 SERPL-SCNC: 24 MMOL/L (ref 21–32)
CREAT BLD-MCNC: 0.87 MG/DL (ref 0.7–1.3)
EGFRCR SERPLBLD CKD-EPI 2021: 85 ML/MIN/1.73M2 (ref 60–?)
EOSINOPHIL # BLD AUTO: 0.06 X10(3) UL (ref 0–0.7)
EOSINOPHIL NFR BLD AUTO: 1.7 %
ERYTHROCYTE [DISTWIDTH] IN BLOOD BY AUTOMATED COUNT: 16 %
GLOBULIN PLAS-MCNC: 1.9 G/DL (ref 2–3.5)
GLUCOSE BLD-MCNC: 136 MG/DL (ref 70–99)
GLUCOSE BLD-MCNC: 200 MG/DL (ref 70–99)
GLUCOSE BLD-MCNC: 218 MG/DL (ref 70–99)
GLUCOSE BLD-MCNC: 220 MG/DL (ref 70–99)
GLUCOSE BLD-MCNC: 289 MG/DL (ref 70–99)
GLUCOSE BLD-MCNC: 305 MG/DL (ref 70–99)
HCT VFR BLD AUTO: 22.3 % (ref 39–53)
HGB BLD-MCNC: 7.7 G/DL (ref 13–17.5)
IMM GRANULOCYTES # BLD AUTO: 0 X10(3) UL (ref 0–1)
IMM GRANULOCYTES NFR BLD: 0 %
LYMPHOCYTES # BLD AUTO: 0.78 X10(3) UL (ref 1–4)
LYMPHOCYTES NFR BLD AUTO: 21.5 %
MAGNESIUM SERPL-MCNC: 1.6 MG/DL (ref 1.6–2.6)
MCH RBC QN AUTO: 29.8 PG (ref 26–34)
MCHC RBC AUTO-ENTMCNC: 34.5 G/DL (ref 31–37)
MCV RBC AUTO: 86.4 FL (ref 80–100)
MONOCYTES # BLD AUTO: 0.09 X10(3) UL (ref 0.1–1)
MONOCYTES NFR BLD AUTO: 2.5 %
NEUTROPHILS # BLD AUTO: 2.66 X10 (3) UL (ref 1.5–7.7)
NEUTROPHILS # BLD AUTO: 2.66 X10(3) UL (ref 1.5–7.7)
NEUTROPHILS NFR BLD AUTO: 73.5 %
OSMOLALITY SERPL CALC.SUM OF ELEC: 307 MOSM/KG (ref 275–295)
PLATELET # BLD AUTO: 84 10(3)UL (ref 150–450)
PLATELETS.RETICULATED NFR BLD AUTO: 2.5 % (ref 0–7)
POTASSIUM SERPL-SCNC: 3.5 MMOL/L (ref 3.5–5.1)
POTASSIUM SERPL-SCNC: 3.6 MMOL/L (ref 3.5–5.1)
POTASSIUM SERPL-SCNC: 4.6 MMOL/L (ref 3.5–5.1)
PROT SERPL-MCNC: 5.3 G/DL (ref 5.7–8.2)
RBC # BLD AUTO: 2.58 X10(6)UL (ref 3.8–5.8)
SODIUM SERPL-SCNC: 145 MMOL/L (ref 136–145)
TROPONIN I SERPL HS-MCNC: ABNORMAL NG/L (ref ?–53)
UNIT VOLUME: 350 ML
UNIT VOLUME: 350 ML
WBC # BLD AUTO: 3.6 X10(3) UL (ref 4–11)

## 2025-04-19 PROCEDURE — 82962 GLUCOSE BLOOD TEST: CPT

## 2025-04-19 PROCEDURE — 84132 ASSAY OF SERUM POTASSIUM: CPT | Performed by: HOSPITALIST

## 2025-04-19 PROCEDURE — 84484 ASSAY OF TROPONIN QUANT: CPT | Performed by: PHYSICIAN ASSISTANT

## 2025-04-19 PROCEDURE — 83735 ASSAY OF MAGNESIUM: CPT | Performed by: PHYSICIAN ASSISTANT

## 2025-04-19 PROCEDURE — 80053 COMPREHEN METABOLIC PANEL: CPT | Performed by: PHYSICIAN ASSISTANT

## 2025-04-19 PROCEDURE — 85025 COMPLETE CBC W/AUTO DIFF WBC: CPT | Performed by: PHYSICIAN ASSISTANT

## 2025-04-19 RX ORDER — MAGNESIUM OXIDE 400 MG/1
400 TABLET ORAL ONCE
Status: COMPLETED | OUTPATIENT
Start: 2025-04-19 | End: 2025-04-19

## 2025-04-19 RX ORDER — PANTOPRAZOLE SODIUM 40 MG/1
40 TABLET, DELAYED RELEASE ORAL
Status: DISCONTINUED | OUTPATIENT
Start: 2025-04-19 | End: 2025-04-23

## 2025-04-19 RX ORDER — VANCOMYCIN HYDROCHLORIDE 125 MG/1
125 CAPSULE ORAL EVERY 6 HOURS SCHEDULED
Status: DISCONTINUED | OUTPATIENT
Start: 2025-04-19 | End: 2025-04-23

## 2025-04-19 RX ORDER — POTASSIUM CHLORIDE 1.5 G/1.58G
40 POWDER, FOR SOLUTION ORAL EVERY 4 HOURS
Status: COMPLETED | OUTPATIENT
Start: 2025-04-19 | End: 2025-04-19

## 2025-04-19 NOTE — PROGRESS NOTES
[facility]    Shaq Song  10/2/1940  [id number]   Provider:  @LOC@  Jose Palencia MD  [unfilled]       NO coffee ground emesis or melena.  Allergies:  Allergies[1]     Current Outpatient Prescriptions:  Current Hospital Medications[2]       HISTORY:  Past Medical History[3]   Past Surgical History[4]   Family History[5]   Short Social Hx on File[6]            REVIEW OF SYSTEMS:   10 point ros reviewed. Pertinent positive per HPI.    EXAM:   /88 (BP Location: Left arm)   Pulse 101   Temp 98 °F (36.7 °C) (Oral)   Resp 20   Wt 159 lb 2.8 oz (72.2 kg)   SpO2 97%   BMI 24.93 kg/m²  Body mass index is 24.93 kg/m².    GENERAL: Well developed, well nourished, in no obvious distress.   CV: RRR  PULM: CTAB  ABDOMEN: Bowel sounds normoactive. Soft, no organomegaly or masses appreciated. Nontender.   EXTREMITIES:No peripheral edema appreciated.   NEURO: Alert and Oriented x 3.      LAB/IMAGING RESULTS:   [unfilled]  Lab Results   Component Value Date    WBC 3.6 04/19/2025    HGB 7.7 04/19/2025    HCT 22.3 04/19/2025    PLT 84.0 04/19/2025    CREATSERUM 0.87 04/19/2025    BUN 17 04/19/2025     04/19/2025    K 3.5 04/19/2025     04/19/2025    CO2 24.0 04/19/2025     04/19/2025    CA 8.6 04/19/2025    ALB 3.4 04/19/2025    ALKPHO 38 04/19/2025    BILT 0.9 04/19/2025    TP 5.3 04/19/2025    AST 34 04/19/2025    ALT 12 04/19/2025    MG 1.6 04/19/2025    PGLU 218 04/19/2025         ASSESSMENT AND PLAN:   Pancreatic Cancer  Upper GI Bleed    - regular diet  - switch to po pantoprazole 40 mg bid ac and would continue indefinitely  - if recurrent bleed, recommend stat cta abdomen  - if hemoglobin stable tomorrow am, restart dave Woods MD  4/19/2025           [1]   Allergies  Allergen Reactions    Pcn [Penicillins]      \" FAINTED\"   [2]   Current Facility-Administered Medications:     dilTIAZem (cardIZEM) 100 mg in sodium chloride 0.9% 100 mL IVPB-ADDV, 2.5-20 mg/hr, Intravenous,  Continuous    dilTIAZem 10 mg BOLUS FROM BAG infusion, 10 mg, Intravenous, Q1H PRN    magnesium oxide (Mag-Ox) tab 400 mg, 400 mg, Oral, Once    potassium chloride (Klor-Con) 20 MEQ oral powder 40 mEq, 40 mEq, Oral, Q4H    acetaminophen (Tylenol Extra Strength) tab 500 mg, 500 mg, Oral, Q4H PRN    ondansetron (Zofran) 4 MG/2ML injection 4 mg, 4 mg, Intravenous, Q6H PRN    metoclopramide (Reglan) 5 mg/mL injection 5 mg, 5 mg, Intravenous, Q8H PRN    pantoprazole (Protonix) 40 mg in sodium chloride 0.9% PF 10 mL IV push, 40 mg, Intravenous, Q12H    cefTRIAXone (Rocephin) 1 g in sodium chloride 0.9% 100 mL IVPB-ADDV, 1 g, Intravenous, Q24H    finasteride (Proscar) tab 5 mg, 5 mg, Oral, Daily    glucose (Dex4) 15 GM/59ML oral liquid 15 g, 15 g, Oral, Q15 Min PRN **OR** glucose (Glutose) 40% oral gel 15 g, 15 g, Oral, Q15 Min PRN **OR** glucose-vitamin C (Dex-4) chewable tab 4 tablet, 4 tablet, Oral, Q15 Min PRN **OR** dextrose 50% injection 50 mL, 50 mL, Intravenous, Q15 Min PRN **OR** glucose (Dex4) 15 GM/59ML oral liquid 30 g, 30 g, Oral, Q15 Min PRN **OR** glucose (Glutose) 40% oral gel 30 g, 30 g, Oral, Q15 Min PRN **OR** glucose-vitamin C (Dex-4) chewable tab 8 tablet, 8 tablet, Oral, Q15 Min PRN    insulin aspart (NovoLOG) 100 Units/mL vial 15 Units, 0.2 Units/kg, Subcutaneous, Once    metoprolol tartrate (Lopressor) tab 50 mg, 50 mg, Oral, BID    amitriptyline (Elavil) tab 10 mg, 10 mg, Oral, QPM    sodium chloride 0.9% infusion, , Intravenous, Once    insulin aspart (NovoLOG) 100 Units/mL FlexPen 2-10 Units, 2-10 Units, Subcutaneous, TID AC and HS  [3]   Past Medical History:   Actinic keratoses    Arrhythmia    AFib    Bladder stones    Colon polyp    DEPRESSION    Depression    Diabetes (HCC)    Enlarged prostate with urinary retention    Esophageal reflux    Morocho catheter in place    Gastritis    Gastro-esophageal reflux disease with esophagitis    Glucose intolerance    Hearing impaired person, bilateral     Hiatal hernia    High blood pressure    HYPERTRIGLYCERIDEMIA    Incontinence    Has a urinary cathether, can be incontinent of stool with urinary urgency    LAE (left atrial enlargement)    LAE (left atrial enlargement)    Left ventricular diastolic dysfunction with preserved systolic function    LVH (left ventricular hypertrophy)    Mild ascending aorta dilation    Osteoarthrosis, unspecified whether generalized or localized, unspecified site    Other and unspecified hyperlipidemia    Pancreas cancer (HCC)    Pancreas cyst (HCC)    bx    Pulmonary embolism (HCC)    Many years ago    RENAL DISEASE    RLS (restless legs syndrome)    Synovial cyst of lumbar facet joint    Unspecified essential hypertension    Vasovagal syncope    Visual impairment    glasses   [4]   Past Surgical History:  Procedure Laterality Date    Colonoscopy  2010    POLYP/5YRS, Dr Garcia    Egd and esd - internal  2017    Esophageal bx benign, pancreatic cyst aspirartion.  Dr Holland    Remove bladder stone,>2.5cm  2025    with TURP    Tonsillectomy      Transurethral elec-surg prostatectom  2025    with bladder stones laser litho    Upper gi endoscopy - referral  2010    Upper gi endoscopy,exam  02/10/2025   [5]   Family History  Problem Relation Age of Onset    Cancer Father         esophageal    Cancer Mother         breast   [6]   Social History  Socioeconomic History    Marital status:    Tobacco Use    Smoking status: Former     Current packs/day: 0.00     Types: Cigarettes     Quit date: 1958     Years since quittin.3    Smokeless tobacco: Never   Vaping Use    Vaping status: Never Used   Substance and Sexual Activity    Alcohol use: Not Currently     Comment: 2-3/MONTH    Drug use: No    Sexual activity: Yes     Comment:      Social Drivers of Health     Food Insecurity: No Food Insecurity (2025)    NCSS - Food Insecurity     Worried About Running Out of Food in the Last Year: No      Ran Out of Food in the Last Year: No   Transportation Needs: No Transportation Needs (4/18/2025)    NCSS - Transportation     Lack of Transportation: No   Housing Stability: Not At Risk (4/18/2025)    NCSS - Housing/Utilities     Has Housing: Yes     Worried About Losing Housing: No     Unable to Get Utilities: No

## 2025-04-19 NOTE — PLAN OF CARE
Assumed care of patient at 1930. Aox4, [place]. RA, denies difficulty w/breathing, lung sounds diminished bilaterally. A-fib w/-120, on cardizem GTT at 15ml/hr. Pt denies pain or discomfort. 1-2x assist in bed. Incontinent of bowel and bladder, primofit in place. Last BM 4/19. Bed locked and in lowest position. Call light and personal items within reach.      -C-diff (+), Contact/enteric precautions in place    Left upper chest implant port, dressing CDI. Line is patent, poor blood return. 0650 Blood return not present, lab notified to collect. Pt expressed dislike of peripheral draws and is requesting the access port be changed to allow for blood return.  Pt informed of risks of exchanging the indwelling needle.      Plan of care:  -Cardizem gtt,  -protonix IV q12  -echo 4/19  -trend trops if cp occurs      Problem: Risk for Violence/Aggression  Goal: Absence of Violence/Aggression  Description: INTERVENTIONS: - Identify precipitating factors for behavior - Notify Charge RN/Provider - Consider decreasing stimulation - Consider distraction measures - Consider discussion with provider regarding prn meds  - Consider JOCELYN (Moderate Risk only) - Consider Code Support (High Risk only) - Consider room safety checks - Consider restraints  Outcome: Progressing     Problem: CARDIOVASCULAR - ADULT  Goal: Maintains optimal cardiac output and hemodynamic stability  Description: INTERVENTIONS:- Monitor vital signs, rhythm, and trends- Monitor for bleeding, hypotension and signs of decreased cardiac output- Evaluate effectiveness of vasoactive medications to optimize hemodynamic stability- Monitor arterial and/or venous puncture sites for bleeding and/or hematoma- Assess quality of pulses, skin color and temperature- Assess for signs of decreased coronary artery perfusion - ex. Angina- Evaluate fluid balance, assess for edema, trend weights  Outcome: Progressing  Goal: Absence of cardiac arrhythmias or at baseline  Description:  INTERVENTIONS:- Continuous cardiac monitoring, monitor vital signs, obtain 12 lead EKG if indicated- Evaluate effectiveness of antiarrhythmic and heart rate control medications as ordered- Initiate emergency measures for life threatening arrhythmias- Monitor electrolytes and administer replacement therapy as ordered  Outcome: Progressing     Problem: PAIN - ADULT  Goal: Verbalizes/displays adequate comfort level or patient's stated pain goal  Description: INTERVENTIONS:- Encourage pt to monitor pain and request assistance- Assess pain using appropriate pain scale- Administer analgesics based on type and severity of pain and evaluate response- Implement non-pharmacological measures as appropriate and evaluate response- Consider cultural and social influences on pain and pain management- Manage/alleviate anxiety- Utilize distraction and/or relaxation techniques- Monitor for opioid side effects- Notify MD/LIP if interventions unsuccessful or patient reports new pain- Anticipate increased pain with activity and pre-medicate as appropriate  Outcome: Progressing     Problem: RESPIRATORY - ADULT  Goal: Achieves optimal ventilation and oxygenation  Description: INTERVENTIONS:- Assess for changes in respiratory status- Assess for changes in mentation and behavior- Position to facilitate oxygenation and minimize respiratory effort- Oxygen supplementation based on oxygen saturation or ABGs- Provide Smoking Cessation handout, if applicable- Encourage broncho-pulmonary hygiene including cough, deep breathe, Incentive Spirometry- Assess the need for suctioning and perform as needed- Assess and instruct to report SOB or any respiratory difficulty- Respiratory Therapy support as indicated- Manage/alleviate anxiety- Monitor for signs/symptoms of CO2 retention  Outcome: Progressing     Problem: Diabetes/Glucose Control  Goal: Glucose maintained within prescribed range  Description: INTERVENTIONS:- Monitor Blood Glucose as ordered-  Assess for signs and symptoms of hyperglycemia and hypoglycemia- Administer ordered medications to maintain glucose within target range- Assess barriers to adequate nutritional intake and initiate nutrition consult as needed- Instruct patient on self management of diabetes  Outcome: Progressing

## 2025-04-19 NOTE — PLAN OF CARE
Aquired patient around 0730. Alert and oriented x4. On Ra. SpO2 within normal limits. Pt denies sob or cp at this time. Afib on tele. HR 60-70. Metoprolol added. Cardizem gtt stopped. Holding DOAC. Protonix IV q12. IV vanco. Diet advanced. Continent of bowel and bladder. Call light within reach. Continue plan of care.     Problem: Risk for Violence/Aggression  Goal: Absence of Violence/Aggression  Description: INTERVENTIONS: - Identify precipitating factors for behavior - Notify Charge RN/Provider - Consider decreasing stimulation - Consider distraction measures - Consider discussion with provider regarding prn meds  - Consider JOCELYN (Moderate Risk only) - Consider Code Support (High Risk only) - Consider room safety checks - Consider restraints  Outcome: Progressing     Problem: CARDIOVASCULAR - ADULT  Goal: Maintains optimal cardiac output and hemodynamic stability  Description: INTERVENTIONS:- Monitor vital signs, rhythm, and trends- Monitor for bleeding, hypotension and signs of decreased cardiac output- Evaluate effectiveness of vasoactive medications to optimize hemodynamic stability- Monitor arterial and/or venous puncture sites for bleeding and/or hematoma- Assess quality of pulses, skin color and temperature- Assess for signs of decreased coronary artery perfusion - ex. Angina- Evaluate fluid balance, assess for edema, trend weights  Outcome: Progressing  Goal: Absence of cardiac arrhythmias or at baseline  Description: INTERVENTIONS:- Continuous cardiac monitoring, monitor vital signs, obtain 12 lead EKG if indicated- Evaluate effectiveness of antiarrhythmic and heart rate control medications as ordered- Initiate emergency measures for life threatening arrhythmias- Monitor electrolytes and administer replacement therapy as ordered  Outcome: Progressing     Problem: PAIN - ADULT  Goal: Verbalizes/displays adequate comfort level or patient's stated pain goal  Description: INTERVENTIONS:- Encourage pt to  monitor pain and request assistance- Assess pain using appropriate pain scale- Administer analgesics based on type and severity of pain and evaluate response- Implement non-pharmacological measures as appropriate and evaluate response- Consider cultural and social influences on pain and pain management- Manage/alleviate anxiety- Utilize distraction and/or relaxation techniques- Monitor for opioid side effects- Notify MD/LIP if interventions unsuccessful or patient reports new pain- Anticipate increased pain with activity and pre-medicate as appropriate  Outcome: Progressing     Problem: RESPIRATORY - ADULT  Goal: Achieves optimal ventilation and oxygenation  Description: INTERVENTIONS:- Assess for changes in respiratory status- Assess for changes in mentation and behavior- Position to facilitate oxygenation and minimize respiratory effort- Oxygen supplementation based on oxygen saturation or ABGs- Provide Smoking Cessation handout, if applicable- Encourage broncho-pulmonary hygiene including cough, deep breathe, Incentive Spirometry- Assess the need for suctioning and perform as needed- Assess and instruct to report SOB or any respiratory difficulty- Respiratory Therapy support as indicated- Manage/alleviate anxiety- Monitor for signs/symptoms of CO2 retention  Outcome: Progressing     Problem: Diabetes/Glucose Control  Goal: Glucose maintained within prescribed range  Description: INTERVENTIONS:- Monitor Blood Glucose as ordered- Assess for signs and symptoms of hyperglycemia and hypoglycemia- Administer ordered medications to maintain glucose within target range- Assess barriers to adequate nutritional intake and initiate nutrition consult as needed- Instruct patient on self management of diabetes  Outcome: Progressing

## 2025-04-19 NOTE — PROGRESS NOTES
Progress Note  Shaq Song Patient Status:  Inpatient    10/2/1940 MRN [id number]   Location [facility] 8NE-A Attending Carlos Collins*   Hosp Day # 1 PCP Jose Palencia MD     Subjective:  Feels \" good\" this morning. No chest pain, palpitations, or dyspnea.  Ventricular rates at rest in the 100's on 15 mg/hr of diltiazem.    Objective:  Physical Exam:   /84 (BP Location: Left arm)   Pulse 96   Temp 98.4 °F (36.9 °C) (Oral)   Resp 15   Wt 159 lb 2.8 oz (72.2 kg)   SpO2 93%   BMI 24.93 kg/m²   Temp (24hrs), Av.3 °F (36.8 °C), Min:98.1 °F (36.7 °C), Max:98.4 °F (36.9 °C)       Intake/Output Summary (Last 24 hours) at 2025 0534  Last data filed at 2025 0334  Gross per 24 hour   Intake 1244.58 ml   Output 1100 ml   Net 144.58 ml     Wt Readings from Last 3 Encounters:   25 159 lb 2.8 oz (72.2 kg)   25 162 lb (73.5 kg)   25 165 lb (74.8 kg)     Telemetry: atrial fibrillation ventricular rates in the 100's  General: Alert and oriented in no apparent distress lying comfortably in bed. Mild pallor. No jaundice.  HEENT: No focal deficits.  Neck: No JVD, carotids 2+ no bruits.  Cardiac: Irregularly irregular, S1, S2 normal, rub or gallop.  Lungs: Clear without wheezes, rales, rhonchi or dullness.  Normal excursions and effort.  Abdomen: Soft, non-tender.   Extremities: Without clubbing, cyanosis or edema.  Peripheral pulses are 2+.  Neurologic: Alert and oriented, normal affect.  Skin: Warm and dry.        Intake/Output:    Intake/Output Summary (Last 24 hours) at 2025 0534  Last data filed at 2025 0334  Gross per 24 hour   Intake 1244.58 ml   Output 1100 ml   Net 144.58 ml       Last 3 Weights   25 0302 159 lb 2.8 oz (72.2 kg)   25 1014 162 lb (73.5 kg)   25 0511 165 lb (74.8 kg)       Labs:  Recent Labs   Lab 25  2155 25  0608 25  0000   * 267*  --    BUN 41* 31*  --    CREATSERUM 1.07 1.02  --    EGFRCR 68  72  --    CA 8.9 8.8  --     144  --    K 3.3* 3.6 3.6    109  --    CO2 24.0 23.0  --      Recent Labs   Lab 04/17/25 2155 04/18/25  0608 04/18/25  1850   RBC 1.95* 1.92*  --    HGB 5.6* 5.6* 7.2*   HCT 17.0* 17.1*  --    MCV 87.2 89.1  --    MCH 28.7 29.2  --    MCHC 32.9 32.7  --    RDW 16.7 16.0  --    NEPRELIM 5.94 2.84  --    WBC 6.8 3.6*  --    .0* 86.0*  --          Recent Labs   Lab 04/17/25 2155 04/18/25  0608   TROPHS 121* 8,405*       Diagnostics:           Medications:  Scheduled Medications[1]  Medication Infusions[2]    Assessment/Plan:    Acute blood loss with pancreatic cancer and concern for erosion into the stomach  Presenting hgb 5.6. maintain Hgb > 7  Follow CBCd today and transfuse as needed - consider 20 IV lasix with transfusions  Hx of pAF presenting in Afib RVR  Eliquis held d/t acute bleed as above  Currently ventricular rates in the 100's  On IV diltiazem ggt, unhold BB and titrate IV down as able  NSTEMI  Troponin 100>8,000 yesterday, suspect component of demand mediated presenting with severe anemia and afib RVR  Remains chest pain free since presentation  Poor interventional candidate given life threatening bleeding and limited endoscopic options if bleeding reoccurs. Given lack of symptoms and significant risk of AC remains off IV heparin for now.  Likely degree of CAD given age  Moderate to severe mitral regurgitation, mitral valve prolapse w/ partial flail P2 scallop -intervention delayed by pancreatic cancer diagnosis  Ascending aorta dilation, 4.4 cm  Pancreatic cancer on chemotherapy  HTN  HLD  DM II  Hx of remote DVT; provoked      PLAN  Remains chest pain free - reviewed current plan with the patient and risks and benefits of AC. He would prefer to remain off AC if asymptomatic which remains reasonable.  Follow troponin this AM and echocardiogram to assist in guiding diuresis and management  Follow Hgb and transfuse as needed  Remains very poor  interventional candidate given acute bleeding and concern for malignancy eroding into the stomach.  The patient would like to discuss his goals of care with family as he would like to be home for [facility].  Will continue to monitor closely for cardiac symptoms          Lucas Harris PA-C  4/19/2025  5:34 AM     Patient seen and examined    Chart reviewed.  Case discussed with patient and nursing.  Patient feels much better.  He has no chest pain.  Hemoglobins this morning are reasonable.  He likely has sustained a non-ST segment elevation MI given his troponins.  With his presenting hemoglobin of 5.6 and invasive tumor in his abdomen, I think we should manage this medically.  Echo with Dopplers been ordered and pending.  Atrial fibrillation is reasonably controlled on diltiazem.    Jon Wayne MD FAC  L3         [1]    pantoprazole  40 mg Intravenous Q12H    cefTRIAXone  1 g Intravenous Q24H    finasteride  5 mg Oral Daily    insulin aspart  0.2 Units/kg Subcutaneous Once    [Held by provider] metoprolol tartrate  50 mg Oral BID    amitriptyline  10 mg Oral QPM    sodium chloride   Intravenous Once    insulin aspart  2-10 Units Subcutaneous TID AC and HS   [2]    dilTIAZem 15 mg/hr (04/19/25 0332)

## 2025-04-19 NOTE — PROGRESS NOTES
[facility]  Hospitalist Progress Note                                                                     [facility]   part of [facility]        Shaq Song  10/2/1940    SUBJECTIVE:  Patient seen and examined.  Reviewed plan.  Denies CP/SOB.  NAD>       OBJECTIVE:  Temp:  [98 °F (36.7 °C)-98.6 °F (37 °C)] 98 °F (36.7 °C)  Pulse:  [] 67  Resp:  [1-21] 20  BP: (110-147)/(48-88) 137/88  SpO2:  [93 %-100 %] 97 %  Exam  Gen: No acute distress, alert and oriented x3, no focal neurologic deficits  Pulm: Lungs clear bilaterally, normal respiratory effort  CV: Heart with regular rate and rhythm, no murmur.  Normal PMI.    Abd: Abdomen soft, nontender, nondistended, no organomegaly, bowel sounds present  MSK: Full range of motion in extremities, no clubbing, no cyanosis  Skin: no rashes or lesions    Labs:   Recent Labs   Lab 04/17/25 2155 04/18/25  0608 04/18/25  1850 04/19/25  0659   WBC 6.8 3.6*  --  3.6*   HGB 5.6* 5.6* 7.2* 7.7*   MCV 87.2 89.1  --  86.4   .0* 86.0*  --  84.0*       Recent Labs   Lab 04/17/25 2155 04/18/25  0608 04/19/25  0000 04/19/25  0659    144  --  145   K 3.3* 3.6 3.6 3.5    109  --  111   CO2 24.0 23.0  --  24.0   BUN 41* 31*  --  17   CREATSERUM 1.07 1.02  --  0.87   CA 8.9 8.8  --  8.6*   MG  --  1.6  --  1.6   * 267*  --  200*       Recent Labs   Lab 04/17/25 2155 04/18/25  0608 04/19/25  0659   ALT 10 11 12   AST 17 33 34*   ALB 3.7 3.3 3.4       Recent Labs   Lab 04/18/25  1353 04/18/25  1355 04/18/25  1656 04/18/25  2212 04/19/25  0556   PGLU 277* 286* 188* 219* 218*       Meds:   Scheduled: Scheduled Medications[1]  Continuous Infusions: Medication Infusions[2]  PRN: PRN Medications[3]    Assessment/Plan:  Principal Problem:    Atrial fibrillation with rapid ventricular response (HCC)  Active Problems:    Diarrhea, unspecified type    Malignant neoplasm of pancreas, unspecified  location of malignancy (HCC)    84 year old male with PMH sig for pancreatic ca on chemotx (follows with [facility]), PAF on eliquis, hx multiple subsegmental PE, HTN, DM2, GERD p/w weakness and fatigue.     Acute on chronic Anemia with black stools  Acute posthemorrhagic blood loss 2/2 Pancreatic tumor eroding into the stomach (EGD 4/18)  - last Hgb in system on 3/6 was 10.2  [facility] - pending  - s/p 3u PRBCs so far  - hold PTA eliquis  - goal Hgb>8 given NSTEMI  - GI following, s/p endoscopy 4/18 with pancreatic tumor eroding into the stomach s/p epi/APC/x2 hemoclips/hemospray  - ADAT per GI   - PPI BID      Paroxysmal atrial fibrillation with RVR  - triggered by anemia, hypovolemia  - diltiazem infusion, s/p IV digoxin in ED  - resume PTA metoprolol BID  - cardiology following  - telemetry monitoring      Elevated troponin  - demand ischemia in setting of anemia, RVR  - should improve with treatment     hx BPH, Urinary retention, follows [facility], had bender removed on 3/18  - low suspicion for UTI  - given C.diff + and no symptoms of UTI will dc CTX  - finasteride     C.diff diarrhea  - +EIA, PCR 4/18  - start po vanco qid      Pancreatic cancer  - follows [facility]  - chart not accessible for complete information, will request consent and submit after, d/w RN  - last chemo 3 days ago  - will need to f/u as OP      Mod-sev Mitral Regurg/MVP  - monitor   - cardiology following  - OP structural evaluation     Hx PE  - no PE on CTA chest 4/17 in ED     Essential HTN  - metoprolol  - hold PTA valsartan to avoid low BP     DM2  - ISS/accucheks     GERD  - ppi      FEN: ADAT, PT/OT  Proph: SCDs  Code status: Full code    Dispo - monitor counts and plan for Afib management through weekend, possible DC Mon/Sommer Collins MD  [facility]  Hospitalist  Message over [employer]  Pager: [phone]    Supplementary  Documentation:   DVT Mechanical Prophylaxis:   SCDs,    DVT Pharmacologic Prophylaxis   Medication   None         DVT Pharmacologic prophylaxis: Aspirin 81 mg      Code Status: Prior  Morocho: No urinary catheter in place  Morocho Duration (in days):   Central line:    DRE:               Dietitian Malnutrition Assessment    Evaluation for Malnutrition: Criteria for non-severe malnutrition diagnosis-         chronic illness related to   Wt loss 7.5% in 3 months., Energy intake less than 75% for greater than 1 month.       RD Malnutrition Care Plan: See RD nutrition assessment for additional recommendations.    Body Fat/Muscle Mass:          Physician Assessment       Patient has a diagnosis of moderate malnutrition             [1]    potassium chloride  40 mEq Oral Q4H    pantoprazole  40 mg Oral BID AC    vancomycin  125 mg Oral 4 times per day    cefTRIAXone  1 g Intravenous Q24H    finasteride  5 mg Oral Daily    insulin aspart  0.2 Units/kg Subcutaneous Once    metoprolol tartrate  50 mg Oral BID    amitriptyline  10 mg Oral QPM    sodium chloride   Intravenous Once    insulin aspart  2-10 Units Subcutaneous TID AC and HS   [2]    dilTIAZem Stopped (04/19/25 1031)   [3]   dilTIAZem    acetaminophen    ondansetron    metoclopramide    glucose **OR** glucose **OR** glucose-vitamin C **OR** dextrose **OR** glucose **OR** glucose **OR** glucose-vitamin C

## 2025-04-20 ENCOUNTER — APPOINTMENT (OUTPATIENT)
Dept: CV DIAGNOSTICS | Facility: HOSPITAL | Age: 85
End: 2025-04-20
Attending: PHYSICIAN ASSISTANT
Payer: MEDICARE

## 2025-04-20 LAB
ANION GAP SERPL CALC-SCNC: 9 MMOL/L (ref 0–18)
BASOPHILS # BLD AUTO: 0.03 X10(3) UL (ref 0–0.2)
BASOPHILS NFR BLD AUTO: 1 %
BUN BLD-MCNC: 15 MG/DL (ref 9–23)
CALCIUM BLD-MCNC: 8.5 MG/DL (ref 8.7–10.6)
CHLORIDE SERPL-SCNC: 107 MMOL/L (ref 98–112)
CO2 SERPL-SCNC: 24 MMOL/L (ref 21–32)
CREAT BLD-MCNC: 0.88 MG/DL (ref 0.7–1.3)
EGFRCR SERPLBLD CKD-EPI 2021: 85 ML/MIN/1.73M2 (ref 60–?)
EOSINOPHIL # BLD AUTO: 0.05 X10(3) UL (ref 0–0.7)
EOSINOPHIL NFR BLD AUTO: 1.6 %
ERYTHROCYTE [DISTWIDTH] IN BLOOD BY AUTOMATED COUNT: 15.9 %
GLUCOSE BLD-MCNC: 166 MG/DL (ref 70–99)
GLUCOSE BLD-MCNC: 191 MG/DL (ref 70–99)
GLUCOSE BLD-MCNC: 191 MG/DL (ref 70–99)
GLUCOSE BLD-MCNC: 214 MG/DL (ref 70–99)
GLUCOSE BLD-MCNC: 274 MG/DL (ref 70–99)
HCT VFR BLD AUTO: 23.9 % (ref 39–53)
HGB BLD-MCNC: 8.2 G/DL (ref 13–17.5)
IMM GRANULOCYTES # BLD AUTO: 0.05 X10(3) UL (ref 0–1)
IMM GRANULOCYTES NFR BLD: 1.6 %
LYMPHOCYTES # BLD AUTO: 0.94 X10(3) UL (ref 1–4)
LYMPHOCYTES NFR BLD AUTO: 29.9 %
MAGNESIUM SERPL-MCNC: 1.6 MG/DL (ref 1.6–2.6)
MCH RBC QN AUTO: 29.8 PG (ref 26–34)
MCHC RBC AUTO-ENTMCNC: 34.3 G/DL (ref 31–37)
MCV RBC AUTO: 86.9 FL (ref 80–100)
MONOCYTES # BLD AUTO: 0.17 X10(3) UL (ref 0.1–1)
MONOCYTES NFR BLD AUTO: 5.4 %
NEUTROPHILS # BLD AUTO: 1.9 X10 (3) UL (ref 1.5–7.7)
NEUTROPHILS # BLD AUTO: 1.9 X10(3) UL (ref 1.5–7.7)
NEUTROPHILS NFR BLD AUTO: 60.5 %
OSMOLALITY SERPL CALC.SUM OF ELEC: 296 MOSM/KG (ref 275–295)
PLATELET # BLD AUTO: 75 10(3)UL (ref 150–450)
PLATELETS.RETICULATED NFR BLD AUTO: 3.4 % (ref 0–7)
POTASSIUM SERPL-SCNC: 3.9 MMOL/L (ref 3.5–5.1)
RBC # BLD AUTO: 2.75 X10(6)UL (ref 3.8–5.8)
SODIUM SERPL-SCNC: 140 MMOL/L (ref 136–145)
WBC # BLD AUTO: 3.1 X10(3) UL (ref 4–11)

## 2025-04-20 PROCEDURE — 85025 COMPLETE CBC W/AUTO DIFF WBC: CPT | Performed by: INTERNAL MEDICINE

## 2025-04-20 PROCEDURE — 93306 TTE W/DOPPLER COMPLETE: CPT | Performed by: PHYSICIAN ASSISTANT

## 2025-04-20 PROCEDURE — 83735 ASSAY OF MAGNESIUM: CPT | Performed by: HOSPITALIST

## 2025-04-20 PROCEDURE — 80048 BASIC METABOLIC PNL TOTAL CA: CPT | Performed by: HOSPITALIST

## 2025-04-20 PROCEDURE — 82962 GLUCOSE BLOOD TEST: CPT

## 2025-04-20 PROCEDURE — 76376 3D RENDER W/INTRP POSTPROCES: CPT | Performed by: PHYSICIAN ASSISTANT

## 2025-04-20 RX ORDER — MAGNESIUM OXIDE 400 MG/1
400 TABLET ORAL ONCE
Status: COMPLETED | OUTPATIENT
Start: 2025-04-20 | End: 2025-04-20

## 2025-04-20 RX ORDER — METOPROLOL TARTRATE 50 MG
50 TABLET ORAL ONCE
Status: DISCONTINUED | OUTPATIENT
Start: 2025-04-20 | End: 2025-04-20

## 2025-04-20 RX ORDER — PANTOPRAZOLE SODIUM 40 MG/1
40 TABLET, DELAYED RELEASE ORAL
Qty: 180 TABLET | Refills: 3 | Status: SHIPPED | OUTPATIENT
Start: 2025-04-20

## 2025-04-20 RX ORDER — VANCOMYCIN HYDROCHLORIDE 125 MG/1
125 CAPSULE ORAL EVERY 6 HOURS SCHEDULED
Qty: 36 CAPSULE | Refills: 0 | Status: SHIPPED | OUTPATIENT
Start: 2025-04-20 | End: 2025-04-29

## 2025-04-20 RX ORDER — METOPROLOL TARTRATE 100 MG/1
100 TABLET ORAL
Status: DISCONTINUED | OUTPATIENT
Start: 2025-04-20 | End: 2025-04-23

## 2025-04-20 NOTE — PLAN OF CARE
Assumed care at 0730; Pt A/o x 4, BP elevated otherwise VS stable and no complaints of pain. Saturating well on RA; Plan of care discussed with patient. Educated on fall risk and use of call light. Belongings and call light within reach. Bed at lowest position and locked.     Problem: Risk for Violence/Aggression  Goal: Absence of Violence/Aggression  Description: INTERVENTIONS: - Identify precipitating factors for behavior - Notify Charge RN/Provider - Consider decreasing stimulation - Consider distraction measures - Consider discussion with provider regarding prn meds  - Consider JOCELYN (Moderate Risk only) - Consider Code Support (High Risk only) - Consider room safety checks - Consider restraints  Outcome: Progressing     Problem: CARDIOVASCULAR - ADULT  Goal: Maintains optimal cardiac output and hemodynamic stability  Description: INTERVENTIONS:- Monitor vital signs, rhythm, and trends- Monitor for bleeding, hypotension and signs of decreased cardiac output- Evaluate effectiveness of vasoactive medications to optimize hemodynamic stability- Monitor arterial and/or venous puncture sites for bleeding and/or hematoma- Assess quality of pulses, skin color and temperature- Assess for signs of decreased coronary artery perfusion - ex. Angina- Evaluate fluid balance, assess for edema, trend weights  Outcome: Progressing  Goal: Absence of cardiac arrhythmias or at baseline  Description: INTERVENTIONS:- Continuous cardiac monitoring, monitor vital signs, obtain 12 lead EKG if indicated- Evaluate effectiveness of antiarrhythmic and heart rate control medications as ordered- Initiate emergency measures for life threatening arrhythmias- Monitor electrolytes and administer replacement therapy as ordered  Outcome: Progressing     Problem: PAIN - ADULT  Goal: Verbalizes/displays adequate comfort level or patient's stated pain goal  Description: INTERVENTIONS:- Encourage pt to monitor pain and request assistance- Assess pain  using appropriate pain scale- Administer analgesics based on type and severity of pain and evaluate response- Implement non-pharmacological measures as appropriate and evaluate response- Consider cultural and social influences on pain and pain management- Manage/alleviate anxiety- Utilize distraction and/or relaxation techniques- Monitor for opioid side effects- Notify MD/LIP if interventions unsuccessful or patient reports new pain- Anticipate increased pain with activity and pre-medicate as appropriate  Outcome: Progressing     Problem: RESPIRATORY - ADULT  Goal: Achieves optimal ventilation and oxygenation  Description: INTERVENTIONS:- Assess for changes in respiratory status- Assess for changes in mentation and behavior- Position to facilitate oxygenation and minimize respiratory effort- Oxygen supplementation based on oxygen saturation or ABGs- Provide Smoking Cessation handout, if applicable- Encourage broncho-pulmonary hygiene including cough, deep breathe, Incentive Spirometry- Assess the need for suctioning and perform as needed- Assess and instruct to report SOB or any respiratory difficulty- Respiratory Therapy support as indicated- Manage/alleviate anxiety- Monitor for signs/symptoms of CO2 retention  Outcome: Progressing     Problem: Diabetes/Glucose Control  Goal: Glucose maintained within prescribed range  Description: INTERVENTIONS:- Monitor Blood Glucose as ordered- Assess for signs and symptoms of hyperglycemia and hypoglycemia- Administer ordered medications to maintain glucose within target range- Assess barriers to adequate nutritional intake and initiate nutrition consult as needed- Instruct patient on self management of diabetes  Outcome: Progressing

## 2025-04-20 NOTE — PROGRESS NOTES
[facility]    Shaq Song  10/2/1940  [id number]   Provider:  @LOC@  Jose Palencia MD  [unfilled]       No coffee ground emesis or melena.  Allergies:  Allergies[1]     Current Outpatient Prescriptions:  Current Hospital Medications[2]       HISTORY:  Past Medical History[3]   Past Surgical History[4]   Family History[5]   Short Social Hx on File[6]            REVIEW OF SYSTEMS:   10 point ros reviewed. Pertinent positive per HPI.    EXAM:   BP (!) 140/106 (BP Location: Left arm)   Pulse 99   Temp 97.9 °F (36.6 °C) (Oral)   Resp 18   Wt 159 lb 2.8 oz (72.2 kg)   SpO2 97%   BMI 24.93 kg/m²  Body mass index is 24.93 kg/m².    GENERAL: Well developed, well nourished, in no obvious distress.   CV: RRR  PULM: CTAB  ABDOMEN: Bowel sounds normoactive. Soft, no organomegaly or masses appreciated. Nontender.   EXTREMITIES:No peripheral edema appreciated.   NEURO: Alert and Oriented x 3.      LAB/IMAGING RESULTS:   [unfilled]  Lab Results   Component Value Date    WBC 3.1 04/20/2025    HGB 8.2 04/20/2025    HCT 23.9 04/20/2025    PLT 75.0 04/20/2025    CREATSERUM 0.88 04/20/2025    BUN 15 04/20/2025     04/20/2025    K 3.9 04/20/2025     04/20/2025    CO2 24.0 04/20/2025     04/20/2025    CA 8.5 04/20/2025    MG 1.6 04/20/2025    PGLU 274 04/20/2025         ASSESSMENT AND PLAN:   Pancreatic Cancer  Upper GI Bleed  C diff    - regular diet  - po pantoprazole 40 mg bid ac and would continue indefinitely, script placed  - script placed fro vancomycin  - ok for aspirin and eliquis    Will sign off at this time.     Abner Woods MD  4/19/2025           [1]   Allergies  Allergen Reactions    Pcn [Penicillins]      \" FAINTED\"   [2]   Current Facility-Administered Medications:     metoprolol tartrate (Lopressor) tab 100 mg, 100 mg, Oral, 2x Daily(Beta Blocker)    dilTIAZem 10 mg BOLUS FROM BAG infusion, 10 mg, Intravenous, Q1H PRN    pantoprazole (Protonix) DR tab 40 mg, 40 mg, Oral, BID AC     vancomycin (Vancocin) cap 125 mg, 125 mg, Oral, 4 times per day    acetaminophen (Tylenol Extra Strength) tab 500 mg, 500 mg, Oral, Q4H PRN    ondansetron (Zofran) 4 MG/2ML injection 4 mg, 4 mg, Intravenous, Q6H PRN    metoclopramide (Reglan) 5 mg/mL injection 5 mg, 5 mg, Intravenous, Q8H PRN    finasteride (Proscar) tab 5 mg, 5 mg, Oral, Daily    glucose (Dex4) 15 GM/59ML oral liquid 15 g, 15 g, Oral, Q15 Min PRN **OR** glucose (Glutose) 40% oral gel 15 g, 15 g, Oral, Q15 Min PRN **OR** glucose-vitamin C (Dex-4) chewable tab 4 tablet, 4 tablet, Oral, Q15 Min PRN **OR** dextrose 50% injection 50 mL, 50 mL, Intravenous, Q15 Min PRN **OR** glucose (Dex4) 15 GM/59ML oral liquid 30 g, 30 g, Oral, Q15 Min PRN **OR** glucose (Glutose) 40% oral gel 30 g, 30 g, Oral, Q15 Min PRN **OR** glucose-vitamin C (Dex-4) chewable tab 8 tablet, 8 tablet, Oral, Q15 Min PRN    insulin aspart (NovoLOG) 100 Units/mL vial 15 Units, 0.2 Units/kg, Subcutaneous, Once    amitriptyline (Elavil) tab 10 mg, 10 mg, Oral, QPM    sodium chloride 0.9% infusion, , Intravenous, Once    insulin aspart (NovoLOG) 100 Units/mL FlexPen 2-10 Units, 2-10 Units, Subcutaneous, TID AC and HS  [3]   Past Medical History:   Actinic keratoses    Arrhythmia    AFib    Bladder stones    Colon polyp    DEPRESSION    Depression    Diabetes (HCC)    Enlarged prostate with urinary retention    Esophageal reflux    Morocho catheter in place    Gastritis    Gastro-esophageal reflux disease with esophagitis    Glucose intolerance    Hearing impaired person, bilateral    Hiatal hernia    High blood pressure    HYPERTRIGLYCERIDEMIA    Incontinence    Has a urinary cathether, can be incontinent of stool with urinary urgency    LAE (left atrial enlargement)    LAE (left atrial enlargement)    Left ventricular diastolic dysfunction with preserved systolic function    LVH (left ventricular hypertrophy)    Mild ascending aorta dilation    Osteoarthrosis, unspecified whether  generalized or localized, unspecified site    Other and unspecified hyperlipidemia    Pancreas cancer (HCC)    Pancreas cyst (HCC)    bx    Pulmonary embolism (HCC)    Many years ago    RENAL DISEASE    RLS (restless legs syndrome)    Synovial cyst of lumbar facet joint    Unspecified essential hypertension    Vasovagal syncope    Visual impairment    glasses   [4]   Past Surgical History:  Procedure Laterality Date    Colonoscopy  2010    POLYP/5YRS, Dr Garcia    Egd and esd - internal  2017    Esophageal bx benign, pancreatic cyst aspirartion.  Dr Holland    Remove bladder stone,>2.5cm  2025    with TURP    Tonsillectomy      Transurethral elec-surg prostatectom  2025    with bladder stones laser litho    Upper gi endoscopy - referral  2010    Upper gi endoscopy,exam  02/10/2025   [5]   Family History  Problem Relation Age of Onset    Cancer Father         esophageal    Cancer Mother         breast   [6]   Social History  Socioeconomic History    Marital status:    Tobacco Use    Smoking status: Former     Current packs/day: 0.00     Types: Cigarettes     Quit date: 1958     Years since quittin.3    Smokeless tobacco: Never   Vaping Use    Vaping status: Never Used   Substance and Sexual Activity    Alcohol use: Not Currently     Comment: 2-3/MONTH    Drug use: No    Sexual activity: Yes     Comment:      Social Drivers of Health     Food Insecurity: No Food Insecurity (2025)    NCSS - Food Insecurity     Worried About Running Out of Food in the Last Year: No     Ran Out of Food in the Last Year: No   Transportation Needs: No Transportation Needs (2025)    NCSS - Transportation     Lack of Transportation: No   Housing Stability: Not At Risk (2025)    NCSS - Housing/Utilities     Has Housing: Yes     Worried About Losing Housing: No     Unable to Get Utilities: No

## 2025-04-20 NOTE — SPIRITUAL CARE NOTE
Spiritual Care Visit Note    Patient Name: Shaq Song Date of Spiritual Care Visit: 25   : 10/2/1940 Primary Dx: Atrial fibrillation with rapid ventricular response (HCC)       Referred By: Referral From: Nurse    Spiritual Care Taxonomy:    Intended Effects: Demonstrate caring and concern    Methods: Collaborate with care team member, Offer spiritual/Quaker support    Interventions: Active listening, Ask guided questions, Explain  role, Provide hospitality, Prayer for healing    Visit Type/Summary:     - Spiritual Care: Consulted with RN prior to visit. Offered empathic listening and emotional support. Provided information regarding how to contact Spiritual Care and left a Spiritual Care information card.  remains available as needed for follow up. Upon entering the patient's room I observed the patient sitting up in bed, wife sitting at bedside. The patient welcomed me and stated that he was admitted into the hospital because for chemo treatments. The patient stated that since finding out about his cancer diagnose he has been feeling depressed. The patient also stated that he is Zoroastrianism and watches mass on television.  provided spiritual and emotional support to the patient and wife. Also  provided a prayer for the patient at the patient's request.The patient and wife expressed their gratitude for the  visit.     Spiritual Care support can be requested via an Epic consult. For urgent/immediate needs, please contact the On Call  at: Isai: ext [phone]    Chaplain Resident Marilyn Jaffe MA

## 2025-04-20 NOTE — PLAN OF CARE
Nurse notified re: bp 169/87 - 116 R 18 asymptomatic patient rec'd evening lopressor 50 mg  >>> bp 130/76 .  Asymptomatic

## 2025-04-20 NOTE — PLAN OF CARE
Pt alert and oriented x4.  Up standby with assistive device.  On room air.  AFIB on tele.  Continent of bowel and bladder, s/p EGD.  No complaints of pain, sob, or chest pain/ discomfort.  Plan of care discussed with pt.  Falls and isolation precautions in place and maintained.  Call light within reach.    POC: Echo, P,O vanco , trend trop.monitor hgb.    Problem: CARDIOVASCULAR - ADULT  Goal: Maintains optimal cardiac output and hemodynamic stability  Description: INTERVENTIONS:- Monitor vital signs, rhythm, and trends- Monitor for bleeding, hypotension and signs of decreased cardiac output- Evaluate effectiveness of vasoactive medications to optimize hemodynamic stability- Monitor arterial and/or venous puncture sites for bleeding and/or hematoma- Assess quality of pulses, skin color and temperature- Assess for signs of decreased coronary artery perfusion - ex. Angina- Evaluate fluid balance, assess for edema, trend weights  Outcome: Progressing  Goal: Absence of cardiac arrhythmias or at baseline  Description: INTERVENTIONS:- Continuous cardiac monitoring, monitor vital signs, obtain 12 lead EKG if indicated- Evaluate effectiveness of antiarrhythmic and heart rate control medications as ordered- Initiate emergency measures for life threatening arrhythmias- Monitor electrolytes and administer replacement therapy as ordered  Outcome: Progressing     Problem: PAIN - ADULT  Goal: Verbalizes/displays adequate comfort level or patient's stated pain goal  Description: INTERVENTIONS:- Encourage pt to monitor pain and request assistance- Assess pain using appropriate pain scale- Administer analgesics based on type and severity of pain and evaluate response- Implement non-pharmacological measures as appropriate and evaluate response- Consider cultural and social influences on pain and pain management- Manage/alleviate anxiety- Utilize distraction and/or relaxation techniques- Monitor for opioid side effects- Notify MD/LIP  if interventions unsuccessful or patient reports new pain- Anticipate increased pain with activity and pre-medicate as appropriate  Outcome: Progressing     Problem: Diabetes/Glucose Control  Goal: Glucose maintained within prescribed range  Description: INTERVENTIONS:- Monitor Blood Glucose as ordered- Assess for signs and symptoms of hyperglycemia and hypoglycemia- Administer ordered medications to maintain glucose within target range- Assess barriers to adequate nutritional intake and initiate nutrition consult as needed- Instruct patient on self management of diabetes  Outcome: Progressing     Problem: RESPIRATORY - ADULT  Goal: Achieves optimal ventilation and oxygenation  Description: INTERVENTIONS:- Assess for changes in respiratory status- Assess for changes in mentation and behavior- Position to facilitate oxygenation and minimize respiratory effort- Oxygen supplementation based on oxygen saturation or ABGs- Provide Smoking Cessation handout, if applicable- Encourage broncho-pulmonary hygiene including cough, deep breathe, Incentive Spirometry- Assess the need for suctioning and perform as needed- Assess and instruct to report SOB or any respiratory difficulty- Respiratory Therapy support as indicated- Manage/alleviate anxiety- Monitor for signs/symptoms of CO2 retention  Outcome: Progressing

## 2025-04-20 NOTE — PROGRESS NOTES
Progress Note  Shaq Song Patient Status:  Inpatient    10/2/1940 MRN [id number]   Location [facility] 8NE-A Attending Dennis, Carlos Woods*   Hosp Day # 2 PCP Jose Palencia MD     Subjective:  Denies symptoms. Notes he feels- great.   Weaned off diltiazem.  Afib ventricular rates in the 110-120s    Objective:  Physical Exam:   BP (!) 169/87 (BP Location: Left arm)   Pulse 105   Temp 98 °F (36.7 °C) (Oral)   Resp 18   Wt 159 lb 2.8 oz (72.2 kg)   SpO2 95%   BMI 24.93 kg/m²   Temp (24hrs), Av °F (36.7 °C), Min:98 °F (36.7 °C), Max:98.1 °F (36.7 °C)       Intake/Output Summary (Last 24 hours) at 2025 0632  Last data filed at 2025  Gross per 24 hour   Intake 540 ml   Output 700 ml   Net -160 ml     Wt Readings from Last 3 Encounters:   25 159 lb 2.8 oz (72.2 kg)   25 162 lb (73.5 kg)   25 165 lb (74.8 kg)     Telemetry: Atrial fibrillation ventricular rates in the 110-120's  General: Alert and oriented in no apparent distress lying comfortably in bed. Mild pallor. No jaundice.  HEENT: No focal deficits.  Neck: No JVD, carotids 2+ no bruits.  Cardiac: Irregularly irregular, S1, S2 normal, rub or gallop.  Lungs: Clear without wheezes, rales, rhonchi or dullness.  Normal excursions and effort.  Abdomen: Soft, non-tender.   Extremities: Without clubbing, cyanosis or edema.  Peripheral pulses are 2+.  Neurologic: Alert and oriented, normal affect.  Skin: Warm and dry.        Intake/Output:    Intake/Output Summary (Last 24 hours) at 2025 0632  Last data filed at 2025  Gross per 24 hour   Intake 540 ml   Output 700 ml   Net -160 ml       Last 3 Weights   25 0302 159 lb 2.8 oz (72.2 kg)   25 1014 162 lb (73.5 kg)   25 0511 165 lb (74.8 kg)       Labs:  Recent Labs   Lab 25  2155 25  0608 25  0000 25  0659 25  1610   * 267*  --  200*  --    BUN 41* 31*  --  17  --    CREATSERUM 1.07 1.02  --  0.87   --    EGFRCR 68 72  --  85  --    CA 8.9 8.8  --  8.6*  --     144  --  145  --    K 3.3* 3.6 3.6 3.5 4.6    109  --  111  --    CO2 24.0 23.0  --  24.0  --      Recent Labs   Lab 04/17/25 2155 04/18/25  0608 04/18/25  1850 04/19/25  0659   RBC 1.95* 1.92*  --  2.58*   HGB 5.6* 5.6* 7.2* 7.7*   HCT 17.0* 17.1*  --  22.3*   MCV 87.2 89.1  --  86.4   MCH 28.7 29.2  --  29.8   MCHC 32.9 32.7  --  34.5   RDW 16.7 16.0  --  16.0   NEPRELIM 5.94 2.84  --  2.66   WBC 6.8 3.6*  --  3.6*   .0* 86.0*  --  84.0*         Recent Labs   Lab 04/17/25 2155 04/18/25 0608 04/19/25  0659   TROPHS 121* 8,405* 13,626*       Diagnostics:           Medications:  Scheduled Medications[1]  Medication Infusions[2]    Assessment/Plan:    Acute blood loss with pancreatic cancer and concern for erosion into the stomach  Presenting hgb 5.6. maintain Hgb > 7  Follow CBCd pending; Please transfuse as needed - consider 20 IV lasix with transfusions  Denies recurrent bleeding  Hx of pAF presenting in Afib RVR  Eliquis held d/t acute bleed as above  Currently ventricular rates  above goal but off IV dilt- increase lopressor to 100 mg BID  On IV diltiazem ggt, titrate down as abvle  NSTEMI  Troponin 100>8,000 yesterday, suspect component of demand mediated presenting with severe anemia and afib RVR  Remains chest pain free since presentation  Poor interventional candidate given life threatening bleeding and limited endoscopic options if bleeding reoccurs. Given lack of symptoms and significant risk of AC remains off IV heparin for now.  Likely degree of CAD given age  Moderate to severe mitral regurgitation, mitral valve prolapse w/ partial flail P2 scallop -intervention delayed by pancreatic cancer diagnosis  Ascending aorta dilation, 4.4 cm  Pancreatic cancer on chemotherapy  HTN  BP above goal - increase BB for rate and BP control  HLD  DM II  Hx of remote DVT; provoked      PLAN  Remains chest pain free -After review of of  risks and benefits of AC the patient would prefer to maintain off of AC which is reasonable.  Follow echocardiogram-called and confirmed it will be down this morning  Follow Hgb and transfuse to > 7  Increase metoprolol 100 mg BID  Remains very poor interventional candidate given acute bleeding and concern for malignancy contributing - will continue monitoring for now  Will continue to monitor closely for cardiac symptoms          Lucas Harris PA-C  4/20/2025  6:32 AM     Patient seen and examined    Patient is without complaints.  A-fib rates are marginally controlled.  Will increase beta-blocker dose.  Patient clearly appears to have had a non-ST segment elevation MI.  Given his multiple core morbidities including a possible mass in his abdomen, he has not been anticoagulated.  Ideally we would prefer anticoagulation given the circumstances.  We will need direction from patient's gastroenterologist regarding risk assessment.  We will get an echo with [facility] today.    Jon Wayne MD [facility]  L3         [1]    metoprolol tartrate  100 mg Oral BID    pantoprazole  40 mg Oral BID AC    vancomycin  125 mg Oral 4 times per day    finasteride  5 mg Oral Daily    insulin aspart  0.2 Units/kg Subcutaneous Once    amitriptyline  10 mg Oral QPM    sodium chloride   Intravenous Once    insulin aspart  2-10 Units Subcutaneous TID AC and HS   [2]    dilTIAZem Stopped (04/19/25 1031)

## 2025-04-20 NOTE — PROGRESS NOTES
[facility]  Hospitalist Progress Note                                                                     [facility]   part of [facility]        Shaq Song  10/2/1940    SUBJECTIVE:  Patient seen and examined.  Wife bedside.  They are not inclined to restart anticoagulation given significant bleeding risk.   Reviewed plan.  Denies CP/SOB.  NAD.       OBJECTIVE:  Temp:  [98 °F (36.7 °C)-98.1 °F (36.7 °C)] 98.1 °F (36.7 °C)  Pulse:  [] 105  Resp:  [18-22] 19  BP: (117-169)/() 144/111  SpO2:  [95 %-98 %] 98 %  Exam  Gen: No acute distress, alert and oriented x3, no focal neurologic deficits  Pulm: Lungs clear bilaterally, normal respiratory effort  CV: Heart with regular rate and rhythm, no murmur.  Normal PMI.    Abd: Abdomen soft, nontender, nondistended, no organomegaly, bowel sounds present  MSK: Full range of motion in extremities, no clubbing, no cyanosis  Skin: no rashes or lesions    Labs:   Recent Labs   Lab 04/17/25 2155 04/18/25  0608 04/18/25  1850 04/19/25  0659 04/20/25  0700   WBC 6.8 3.6*  --  3.6* 3.1*   HGB 5.6* 5.6* 7.2* 7.7* 8.2*   MCV 87.2 89.1  --  86.4 86.9   .0* 86.0*  --  84.0* 75.0*       Recent Labs   Lab 04/17/25 2155 04/18/25  0608 04/19/25  0000 04/19/25  0659 04/19/25  1610 04/20/25  0700    144  --  145  --  140   K 3.3* 3.6 3.6 3.5 4.6 3.9    109  --  111  --  107   CO2 24.0 23.0  --  24.0  --  24.0   BUN 41* 31*  --  17  --  15   CREATSERUM 1.07 1.02  --  0.87  --  0.88   CA 8.9 8.8  --  8.6*  --  8.5*   MG  --  1.6  --  1.6  --  1.6   * 267*  --  200*  --  191*       Recent Labs   Lab 04/17/25  2155 04/18/25  0608 04/19/25  0659   ALT 10 11 12   AST 17 33 34*   ALB 3.7 3.3 3.4       Recent Labs   Lab 04/19/25  1325 04/19/25  1358 04/19/25  1633 04/19/25  2131 04/20/25  0549   PGLU 305* 289* 220* 136* 191*       Meds:   Scheduled: Scheduled Medications[1]  Continuous  Infusions: Medication Infusions[2]  PRN: PRN Medications[3]    Assessment/Plan:  Principal Problem:    Atrial fibrillation with rapid ventricular response (HCC)  Active Problems:    Diarrhea, unspecified type    Malignant neoplasm of pancreas, unspecified location of malignancy (HCC)    84 year old male with PMH sig for pancreatic ca on chemotx (follows with [facility]), PAF on eliquis, hx multiple subsegmental PE, HTN, DM2, GERD p/w weakness and fatigue.     Acute on chronic Anemia with black stools  Acute posthemorrhagic blood loss 2/2 Pancreatic tumor eroding into the stomach (EGD 4/18)  - last Hgb in system on 3/6 was 10.2  - [facility] chart not available through [facility], will request consent - pending  - s/p 3u PRBCs so far  - hold PTA eliquis  - goal Hgb>8 given NSTEMI  - GI following, s/p endoscopy 4/18 with pancreatic tumor eroding into the stomach s/p epi/APC/x2 hemoclips/hemospray  - ADAT per GI   - PPI BID      Paroxysmal atrial fibrillation with RVR  - triggered by anemia, hypovolemia  - diltiazem infusion, s/p IV digoxin in ED  - resume PTA metoprolol BID, increase to 100 BID  - cardiology following  - telemetry monitoring      Elevated troponin  - demand ischemia in setting of anemia, RVR  - should improve with treatment     hx BPH, Urinary retention, follows [facility], had bender removed on 3/18  - low suspicion for UTI  - given C.diff + and no symptoms of UTI will dc CTX  - finasteride     C.diff diarrhea  - +EIA, PCR 4/18  - started po vanco qid 4/19, will need 10d course      Pancreatic cancer  - follows [facility]  - chart not accessible for complete information, will request consent and submit after, d/w RN  - last chemo 3 days ago  - will need to f/u as OP      Mod-sev Mitral Regurg/MVP  - monitor   - cardiology following  - OP structural evaluation     Hx PE  - no PE on CTA chest 4/17 in ED     Essential HTN  - metoprolol  - hold PTA valsartan to avoid low BP     DM2  -  ISS/accucheks     GERD  - ppi      FEN: ADAT, PT/OT  Proph: SCDs  Code status: Full code    Dispo - monitor counts and plan for Afib management through weekend, possible DC Mon/Tues if HR is stable, needs to f/u as OP with his oncologist at [place]       Chuck Collins MD  [facility] Hospitalist  Message over [email or web address]  Pager: [phone]    Supplementary Documentation:   DVT Mechanical Prophylaxis:   SCDs,    DVT Pharmacologic Prophylaxis   Medication   None         DVT Pharmacologic prophylaxis: Aspirin 81 mg      Code Status: Prior  [place]: No urinary catheter in place  [place] Duration (in days):   Central line:    DRE: 4/20/2025              Dietitian Malnutrition Assessment    Evaluation for Malnutrition: Criteria for non-severe malnutrition diagnosis-         chronic illness related to   Wt loss 7.5% in 3 months., Energy intake less than 75% for greater than 1 month.       RD Malnutrition Care Plan: See RD nutrition assessment for additional recommendations.    Body Fat/Muscle Mass:          Physician Assessment       Patient has a diagnosis of moderate malnutrition             [1]    metoprolol tartrate  100 mg Oral 2x Daily(Beta Blocker)    pantoprazole  40 mg Oral BID AC    vancomycin  125 mg Oral 4 times per day    finasteride  5 mg Oral Daily    insulin aspart  0.2 Units/kg Subcutaneous Once    amitriptyline  10 mg Oral QPM    sodium chloride   Intravenous Once    insulin aspart  2-10 Units Subcutaneous TID AC and HS   [2] [3]   dilTIAZem    acetaminophen    ondansetron    metoclopramide    glucose **OR** glucose **OR** glucose-vitamin C **OR** dextrose **OR** glucose **OR** glucose **OR** glucose-vitamin C

## 2025-04-21 ENCOUNTER — APPOINTMENT (OUTPATIENT)
Dept: CT IMAGING | Facility: HOSPITAL | Age: 85
End: 2025-04-21
Attending: PHYSICIAN ASSISTANT
Payer: MEDICARE

## 2025-04-21 LAB
ANION GAP SERPL CALC-SCNC: 7 MMOL/L (ref 0–18)
ANION GAP SERPL CALC-SCNC: 9 MMOL/L (ref 0–18)
APTT PPP: 28.4 SECONDS (ref 23–36)
BASOPHILS # BLD AUTO: 0.09 X10(3) UL (ref 0–0.2)
BASOPHILS NFR BLD AUTO: 1.4 %
BLOOD TYPE BARCODE: 6200
BUN BLD-MCNC: 17 MG/DL (ref 9–23)
BUN BLD-MCNC: 17 MG/DL (ref 9–23)
CALCIUM BLD-MCNC: 8.5 MG/DL (ref 8.7–10.6)
CALCIUM BLD-MCNC: 8.6 MG/DL (ref 8.7–10.6)
CHLORIDE SERPL-SCNC: 104 MMOL/L (ref 98–112)
CHLORIDE SERPL-SCNC: 105 MMOL/L (ref 98–112)
CO2 SERPL-SCNC: 25 MMOL/L (ref 21–32)
CO2 SERPL-SCNC: 25 MMOL/L (ref 21–32)
CREAT BLD-MCNC: 1.04 MG/DL (ref 0.7–1.3)
CREAT BLD-MCNC: 1.05 MG/DL (ref 0.7–1.3)
EGFRCR SERPLBLD CKD-EPI 2021: 70 ML/MIN/1.73M2 (ref 60–?)
EGFRCR SERPLBLD CKD-EPI 2021: 71 ML/MIN/1.73M2 (ref 60–?)
EOSINOPHIL # BLD AUTO: 0.17 X10(3) UL (ref 0–0.7)
EOSINOPHIL NFR BLD AUTO: 2.7 %
ERYTHROCYTE [DISTWIDTH] IN BLOOD BY AUTOMATED COUNT: 15.6 %
ERYTHROCYTE [DISTWIDTH] IN BLOOD BY AUTOMATED COUNT: 15.7 %
GLUCOSE BLD-MCNC: 183 MG/DL (ref 70–99)
GLUCOSE BLD-MCNC: 185 MG/DL (ref 70–99)
GLUCOSE BLD-MCNC: 200 MG/DL (ref 70–99)
GLUCOSE BLD-MCNC: 205 MG/DL (ref 70–99)
GLUCOSE BLD-MCNC: 216 MG/DL (ref 70–99)
GLUCOSE BLD-MCNC: 219 MG/DL (ref 70–99)
GLUCOSE BLD-MCNC: 229 MG/DL (ref 70–99)
GLUCOSE BLD-MCNC: 248 MG/DL (ref 70–99)
HCT VFR BLD AUTO: 28.5 % (ref 39–53)
HCT VFR BLD AUTO: 29.1 % (ref 39–53)
HGB BLD-MCNC: 9.8 G/DL (ref 13–17.5)
HGB BLD-MCNC: 9.9 G/DL (ref 13–17.5)
IMM GRANULOCYTES # BLD AUTO: 0.09 X10(3) UL (ref 0–1)
IMM GRANULOCYTES NFR BLD: 1.4 %
LYMPHOCYTES # BLD AUTO: 1.31 X10(3) UL (ref 1–4)
LYMPHOCYTES NFR BLD AUTO: 20.9 %
MAGNESIUM SERPL-MCNC: 1.6 MG/DL (ref 1.6–2.6)
MCH RBC QN AUTO: 29.3 PG (ref 26–34)
MCH RBC QN AUTO: 29.8 PG (ref 26–34)
MCHC RBC AUTO-ENTMCNC: 33.7 G/DL (ref 31–37)
MCHC RBC AUTO-ENTMCNC: 34.7 G/DL (ref 31–37)
MCV RBC AUTO: 85.8 FL (ref 80–100)
MCV RBC AUTO: 86.9 FL (ref 80–100)
MONOCYTES # BLD AUTO: 0.43 X10(3) UL (ref 0.1–1)
MONOCYTES NFR BLD AUTO: 6.9 %
NEUTROPHILS # BLD AUTO: 4.17 X10 (3) UL (ref 1.5–7.7)
NEUTROPHILS # BLD AUTO: 4.17 X10(3) UL (ref 1.5–7.7)
NEUTROPHILS NFR BLD AUTO: 66.7 %
OSMOLALITY SERPL CALC.SUM OF ELEC: 292 MOSM/KG (ref 275–295)
OSMOLALITY SERPL CALC.SUM OF ELEC: 292 MOSM/KG (ref 275–295)
PLATELET # BLD AUTO: 108 10(3)UL (ref 150–450)
PLATELET # BLD AUTO: 114 10(3)UL (ref 150–450)
PLATELETS.RETICULATED NFR BLD AUTO: 5.1 % (ref 0–7)
POTASSIUM SERPL-SCNC: 3.7 MMOL/L (ref 3.5–5.1)
POTASSIUM SERPL-SCNC: 4.1 MMOL/L (ref 3.5–5.1)
RBC # BLD AUTO: 3.32 X10(6)UL (ref 3.8–5.8)
RBC # BLD AUTO: 3.35 X10(6)UL (ref 3.8–5.8)
SODIUM SERPL-SCNC: 137 MMOL/L (ref 136–145)
SODIUM SERPL-SCNC: 138 MMOL/L (ref 136–145)
UNIT VOLUME: 350 ML
WBC # BLD AUTO: 6.3 X10(3) UL (ref 4–11)
WBC # BLD AUTO: 6.8 X10(3) UL (ref 4–11)

## 2025-04-21 PROCEDURE — 85025 COMPLETE CBC W/AUTO DIFF WBC: CPT | Performed by: HOSPITALIST

## 2025-04-21 PROCEDURE — 74178 CT ABD&PLV WO CNTR FLWD CNTR: CPT | Performed by: PHYSICIAN ASSISTANT

## 2025-04-21 PROCEDURE — 83735 ASSAY OF MAGNESIUM: CPT | Performed by: HOSPITALIST

## 2025-04-21 PROCEDURE — 82962 GLUCOSE BLOOD TEST: CPT

## 2025-04-21 PROCEDURE — 85730 THROMBOPLASTIN TIME PARTIAL: CPT | Performed by: NURSE PRACTITIONER

## 2025-04-21 PROCEDURE — 80048 BASIC METABOLIC PNL TOTAL CA: CPT | Performed by: HOSPITALIST

## 2025-04-21 PROCEDURE — 80048 BASIC METABOLIC PNL TOTAL CA: CPT | Performed by: NURSE PRACTITIONER

## 2025-04-21 PROCEDURE — 85027 COMPLETE CBC AUTOMATED: CPT | Performed by: NURSE PRACTITIONER

## 2025-04-21 RX ORDER — HEPARIN SODIUM AND DEXTROSE 10000; 5 [USP'U]/100ML; G/100ML
INJECTION INTRAVENOUS CONTINUOUS
Status: DISPENSED | OUTPATIENT
Start: 2025-04-21 | End: 2025-04-22

## 2025-04-21 RX ORDER — DILTIAZEM HCL 60 MG
60 TABLET ORAL EVERY 6 HOURS SCHEDULED
Status: DISCONTINUED | OUTPATIENT
Start: 2025-04-21 | End: 2025-04-23

## 2025-04-21 RX ORDER — POTASSIUM CHLORIDE 1500 MG/1
40 TABLET, EXTENDED RELEASE ORAL ONCE
Status: COMPLETED | OUTPATIENT
Start: 2025-04-21 | End: 2025-04-21

## 2025-04-21 RX ORDER — MAGNESIUM OXIDE 400 MG/1
400 TABLET ORAL ONCE
Status: COMPLETED | OUTPATIENT
Start: 2025-04-21 | End: 2025-04-21

## 2025-04-21 RX ORDER — HEPARIN SODIUM AND DEXTROSE 10000; 5 [USP'U]/100ML; G/100ML
12 INJECTION INTRAVENOUS ONCE
Status: COMPLETED | OUTPATIENT
Start: 2025-04-21 | End: 2025-04-21

## 2025-04-21 NOTE — PLAN OF CARE
CT abd reviewed with Dr Teo Acosta. Will re-trial anticoagulation with IV heparin per acs protocol (no bolus). Will check q 8hr hgb.      Discussed with RN. All orders placed.     Bria Blancas NP  4/21/2025  5:39 PM  [phone]

## 2025-04-21 NOTE — PROGRESS NOTES
Progress Note  Shaq Song Patient Status:  Inpatient    10/2/1940 MRN [id number]   Location [facility] 8NE-A Attending Dennis, Carlos Woods*   Hosp Day # 3 PCP Jose Palencia MD     Subjective:  Denies symptoms. Notes he feels- great.   Recurrent afib RVR last night back on 5mg/hr of diltiazem   OK to resume AC per GI  No BM as of yet, dark brown smear noted on pull up    Objective:  Physical Exam:   BP (!) 147/93 (BP Location: Left arm)   Pulse 92   Temp 98.1 °F (36.7 °C) (Oral)   Resp 16   Wt 159 lb 2.8 oz (72.2 kg)   SpO2 98%   BMI 24.93 kg/m²   Temp (24hrs), Av.1 °F (36.7 °C), Min:97.9 °F (36.6 °C), Max:98.7 °F (37.1 °C)       Intake/Output Summary (Last 24 hours) at 2025 0913  Last data filed at 2025 0816  Gross per 24 hour   Intake 600 ml   Output 650 ml   Net -50 ml     Wt Readings from Last 3 Encounters:   25 159 lb 2.8 oz (72.2 kg)   25 162 lb (73.5 kg)   25 165 lb (74.8 kg)     Telemetry: Atrial fibrillation ventricular rates in the 90's  General: Thin male who is alert and oriented in no apparent distress lying comfortably in bed. Mild pallor. No jaundice.  HEENT: No focal deficits.  Neck: No JVD, carotids 2+ no bruits.  Cardiac: Irregularly irregular, S1, S2 normal, rub or gallop.  Lungs: Clear without wheezes, rales, rhonchi or dullness.  Normal excursions and effort.  Abdomen: Soft, non-tender.   Extremities: Without clubbing, cyanosis or edema.  Peripheral pulses are 2+.  Neurologic: Alert and oriented, normal affect.  Skin: Warm and dry.        Intake/Output:    Intake/Output Summary (Last 24 hours) at 2025 0913  Last data filed at 2025 0816  Gross per 24 hour   Intake 600 ml   Output 650 ml   Net -50 ml       Last 3 Weights   25 0302 159 lb 2.8 oz (72.2 kg)   25 1014 162 lb (73.5 kg)   25 0511 165 lb (74.8 kg)       Labs:  Recent Labs   Lab 25  0608 25  0000 25  0659 25  1610 25  0700    *  --  200*  --  191*   BUN 31*  --  17  --  15   CREATSERUM 1.02  --  0.87  --  0.88   EGFRCR 72  --  85  --  85   CA 8.8  --  8.6*  --  8.5*     --  145  --  140   K 3.6   < > 3.5 4.6 3.9     --  111  --  107   CO2 23.0  --  24.0  --  24.0    < > = values in this interval not displayed.     Recent Labs   Lab 04/18/25  0608 04/18/25  1850 04/19/25  0659 04/20/25  0700   RBC 1.92*  --  2.58* 2.75*   HGB 5.6* 7.2* 7.7* 8.2*   HCT 17.1*  --  22.3* 23.9*   MCV 89.1  --  86.4 86.9   MCH 29.2  --  29.8 29.8   MCHC 32.7  --  34.5 34.3   RDW 16.0  --  16.0 15.9   NEPRELIM 2.84  --  2.66 1.90   WBC 3.6*  --  3.6* 3.1*   PLT 86.0*  --  84.0* 75.0*         Recent Labs   Lab 04/17/25 2155 04/18/25  0608 04/19/25  0659   TROPHS 121* 8,405* 13,626*       Diagnostics:   ECHOCARDIOGRAM 4/21:  1. Left ventricle: The cavity size was normal. Wall thickness was normal.       Systolic function was normal. The estimated ejection fraction was       50-55%, by 3D assessment. Unable to assess LV diastolic function due to heart rhythm.   2.  Left ventricle: There is hypokinesis of the basal-mid inferoseptal and       entire inferior walls.   3.  Right ventricle: Systolic function was normal.   4.  Left atrium: The left atrial volume was markedly increased.   5.  Right atrium: The atrium was moderately dilated.   6.  Aortic valve: The valve was trileaflet. The leaflets were mildly       calcified. There was mild regurgitation.   7.  Aortic root: The aortic root was 4.1cm diameter.   8.  Ascending aorta: The ascending aorta was 4.2cm diameter.   9.  Mitral valve: Prolapse, involving the posterior leaflet. There was       moderate regurgitation.   10. Pulmonary arteries: Systolic pressure was mildly increased, in the range       of 35mm Hg to 40mm Hg.   Impressions:  This study is compared with previous dated 12/19/2019: Wall   motion abnormality is new. Mitral regurgitation was noted previously.   *        Medications:  Scheduled Medications[1]  Medication Infusions[2]    Assessment/Plan:    Acute blood loss with pancreatic cancer and concern for erosion into the stomach  Presenting hgb 5.6. maintain Hgb > 7  Follow CBCd pending today; Please transfuse as needed - consider 20 IV lasix with transfusions  Denies recurrent bleeding  NSTEMI likely combination of demand mediated presented with above and ischemic CAD  LVEF remains preserved, hypokinesis of basal-mid inferoseptal and inferior walls  Poor interventional candidate given life threatening bleeding and per GI limited endoscopic options if bleeding reoccurs. Given lack of symptoms and significant risk of AC had remained off of AC - reasonable to resume AC per GI  Likely degree of CAD given age and eventual ischemic evaluation is warranted  Hx of pAF presenting in Afib RVR  AC held as above  Currently ventricular rates controlled  back on IV diltiazem- increase lopressor to 100 mg BID  Adjust to PO diltiazem and wean IV.  LVEF preserved   Moderate to severe mitral regurgitation, mitral valve prolapse w/ partial flail P2 scallop -intervention delayed by pancreatic cancer diagnosis  Compensated throughout hospital stay  Ascending aorta dilation, 4.4 cm  Pancreatic cancer on chemotherapy  HTN  BP above goal - increase BB for rate and BP control  HLD  DM II  Hx of remote DVT; provoked      PLAN  Remains chest pain free -GI indicates reasonable to resume AC- Would begin with IV heparin and monitor Hgb's - patient remains hesitant and would like us to discuss with outpatient oncology team first.  I will attempt to contact oncology  Follow Hgb and transfuse to > 7  Metoprolol 100 mg BID, up titrate PO diltiazem and wean IV  Remains very poor interventional candidate given acute bleeding and concern for malignancy contributing - eventual ischemic evaluation  Reasonable for him to transfer to [facility] where he receives his oncologic care if able  Will continue to monitor  closely for cardiac symptoms          Lucas Harris PA-C  4/21/2025  09:42 AM       ADDENDUM 4/21 13:43:  Discussed with patient's primary oncologist. Pancreatic lesion is in the tail which is more concerning for potential stomach involvement. Agrees with following CTA Abd/Pelvis.  Also, notes recent diagnosis of PE in March of this year and advises AC once able to tolerate. We will follow CTA abd/pelvis and begin IV heparin trial based on findings.    Patient seen and examined independently.  Note reviewed and labs reviewed. Agree with above assessment and plan. 84 year old male who presented with acute anemia 2/2 gi blood loss who underwent endoscopic eval notable for bleeding from AVN or neovascularization from potential tumor erosion in the gastric fundus and proximal body. Incidentally, also noted to have significantly elevated troponin suggestive of NSTEMI. These findings are in the setting of pancreatic cancer for which he is receiving chemo therapy. Course further c/b afib rvr requiring cardizem infusion. Certainly, patient would benefit from full dose anticoagulation and ischemia eval via [facility]. However, given his presentation and underlying cancer, he is hesitant to start full dose AC. I recommend CTA abdomen/pelvis for further eval/risk stratification prior to initiation. His oncologist was contacted who noted recent hx of PE as well. Patient was ultimately agreeable to AC initiation depending on CT results. Ischemia eval on hold for now given lack of anginal symptoms and given uncertainty of anti-platelet tolerance. Furthermore, if he has a coronary lesion - given his valvular hx of posterior leaflet prolapse - surgery would be ideal - doubtful that he was be an ideal candidate unfortunately.  ECHO shows overall preserved LV systolic function with RWMA and moderate mitral regurgitation. Given his lack of cardiopulmonary symptoms for now, he would prefer to wait and observe. We will cont to optimize rate  control regimen for which he is currently on metoprolol succinate and diltiazem po. Follow hgb. Will cont to closely monitor. Discussed in detail with the patient and his wife at bedside along with primary service.      Teo Acosta DO  Cardiologist  [facility]  4/21/2025 5:02 PM      Note to the patient: The 21st Century Cures Act makes medical notes like these available to patients in the interest of transparency. However, be advised that this is a medical document. It is intended as peer to peer communication. It is written in medical language and may contain abbreviations or verbiage that are unfamiliar. It may appear blunt or direct. Medical documents are intended to carry relevant information, facts as evident, and clinical opinion of the practitioner.     Disclaimer: Components of this note were documented using voice recognition system and are subject to errors not corrected at proofreading. Contact the author of this note for any clarifications.              [1]    metoprolol tartrate  100 mg Oral 2x Daily(Beta Blocker)    pantoprazole  40 mg Oral BID AC    vancomycin  125 mg Oral 4 times per day    finasteride  5 mg Oral Daily    insulin aspart  0.2 Units/kg Subcutaneous Once    amitriptyline  10 mg Oral QPM    sodium chloride   Intravenous Once    insulin aspart  2-10 Units Subcutaneous TID AC and HS   [2]    dilTIAZem 5 mg/hr (04/21/25 0859)

## 2025-04-21 NOTE — CM/SW NOTE
Received order stating for pt to transfer to [facility] since pt follows w/ Oncology there. SW notified Transfer Center of this update.     Maureen PATRICIA, LSW  Discharge Planner

## 2025-04-21 NOTE — PROGRESS NOTES
[facility]  Hospitalist Progress Note                                                                     [facility]   part of [facility]        Shaq Song  10/2/1940    SUBJECTIVE:  Patient seen and examined.  Wife bedside.  They are hesitant to resume AC until they speak with his oncologist.   Still tach .  Denies CP/SOB.  NAD.       OBJECTIVE:  Temp:  [97.9 °F (36.6 °C)-98.7 °F (37.1 °C)] 98.1 °F (36.7 °C)  Pulse:  [] 92  Resp:  [16-19] 16  BP: (135-164)/() 147/93  SpO2:  [98 %] 98 %  Exam  Gen: No acute distress, alert and oriented x3, no focal neurologic deficits  Pulm: Lungs clear bilaterally, normal respiratory effort  CV: Heart with regular rate and rhythm, no murmur.  Normal PMI.    Abd: Abdomen soft, nontender, nondistended, no organomegaly, bowel sounds present  MSK: Full range of motion in extremities, no clubbing, no cyanosis  Skin: no rashes or lesions    Labs:   Recent Labs   Lab 04/17/25  2155 04/18/25  0608 04/18/25  1850 04/19/25  0659 04/20/25  0700   WBC 6.8 3.6*  --  3.6* 3.1*   HGB 5.6* 5.6* 7.2* 7.7* 8.2*   MCV 87.2 89.1  --  86.4 86.9   .0* 86.0*  --  84.0* 75.0*       Recent Labs   Lab 04/17/25 2155 04/18/25  0608 04/19/25  0000 04/19/25  0659 04/19/25  1610 04/20/25  0700    144  --  145  --  140   K 3.3* 3.6 3.6 3.5 4.6 3.9    109  --  111  --  107   CO2 24.0 23.0  --  24.0  --  24.0   BUN 41* 31*  --  17  --  15   CREATSERUM 1.07 1.02  --  0.87  --  0.88   CA 8.9 8.8  --  8.6*  --  8.5*   MG  --  1.6  --  1.6  --  1.6   * 267*  --  200*  --  191*       Recent Labs   Lab 04/17/25  2155 04/18/25  0608 04/19/25  0659   ALT 10 11 12   AST 17 33 34*   ALB 3.7 3.3 3.4       Recent Labs   Lab 04/20/25  0549 04/20/25  1149 04/20/25  1644 04/20/25  2142 04/21/25  0442   PGLU 191* 274* 214* 166* 183*       Meds:   Scheduled: Scheduled Medications[1]  Continuous Infusions: Medication  Infusions[2]  PRN: PRN Medications[3]    Assessment/Plan:  Principal Problem:    Atrial fibrillation with rapid ventricular response (HCC)  Active Problems:    Diarrhea, unspecified type    Malignant neoplasm of pancreas, unspecified location of malignancy (HCC)    84 year old male with PMH sig for pancreatic ca on chemotx (follows with [facility]), PAF on eliquis, hx multiple subsegmental PE, HTN, DM2, GERD p/w weakness and fatigue.     Acute on chronic Anemia with black stools  Acute posthemorrhagic blood loss 2/2 Pancreatic tumor eroding into the stomach (EGD 4/18)  - last Hgb in system on 3/6 was 10.2  - [facility] - pending  - s/p 3u PRBCs so far  - hold PTA eliquis  - goal Hgb>8 given NSTEMI  - GI following, s/p endoscopy 4/18 with pancreatic tumor eroding into the stomach s/p epi/APC/x2 hemoclips/hemospray  - cleared to resume AC per GI, pt would like to d/w his oncologist at [facility] prior to resuming  - PPI BID   - d/w Cardiology, obtain CTA abd/pelvis to evaluate pancreatic mass and bleeding risk      Paroxysmal atrial fibrillation with RVR  - triggered by anemia, hypovolemia  - diltiazem infusion, s/p IV digoxin in ED  - resume PTA metoprolol BID, increase to 100 BID  - cardiology following  - telemetry monitoring      Elevated troponin  - demand ischemia in setting of anemia, RVR  - should improve with treatment  - defer ischemic evaluation to cardiology, can he tolerate antiplatelet therapy post-LHC?     hx BPH, Urinary retention, follows [facility], had bender removed on 3/18  - low suspicion for UTI  - given C.diff + and no symptoms of UTI will dc CTX  - finasteride     C.diff diarrhea  - +EIA, PCR 4/18  - started po vanco qid 4/19, will need 10d course      Pancreatic cancer  - follows [facility]  - chart not accessible for complete information, will request consent and submit after, d/w RN  - last chemo 3 days ago  - will need to f/u as  OP      Mod-sev Mitral Regurg/MVP  - monitor   - cardiology following  - OP structural evaluation     Hx PE  - no PE on CTA chest 4/17 in ED     Essential HTN  - metoprolol  - hold PTA valsartan to avoid low BP     DM2  - ISS/accucheks     GERD  - ppi      FEN: ADAT, PT/OT  Proph: SCDs  Code status: Full code    Dispo - await CT results, further outreach to [facility] underway per cardiology       Chuck Collins MD  [facility]  Message over [email or web address]  Pager: [phone]    Supplementary Documentation:   DVT Mechanical Prophylaxis:   SCDs,    DVT Pharmacologic Prophylaxis   Medication   None         DVT Pharmacologic prophylaxis: Aspirin 81 mg      Code Status: Prior  [place]: No urinary catheter in place  [place] Duration (in days):   Central line:    DRE: 4/21/2025              Dietitian Malnutrition Assessment    Evaluation for Malnutrition: Criteria for non-severe malnutrition diagnosis-         chronic illness related to   Wt loss 7.5% in 3 months., Energy intake less than 75% for greater than 1 month.       RD Malnutrition Care Plan: See RD nutrition assessment for additional recommendations.    Body Fat/Muscle Mass:          Physician Assessment       Patient has a diagnosis of moderate malnutrition             [1]    metoprolol tartrate  100 mg Oral 2x Daily(Beta Blocker)    pantoprazole  40 mg Oral BID AC    vancomycin  125 mg Oral 4 times per day    finasteride  5 mg Oral Daily    insulin aspart  0.2 Units/kg Subcutaneous Once    amitriptyline  10 mg Oral QPM    sodium chloride   Intravenous Once    insulin aspart  2-10 Units Subcutaneous TID AC and HS   [2]    dilTIAZem 5 mg/hr (04/21/25 0859)   [3]   dilTIAZem    acetaminophen    ondansetron    metoclopramide    glucose **OR** glucose **OR** glucose-vitamin C **OR** dextrose **OR** glucose **OR** glucose **OR** glucose-vitamin C

## 2025-04-21 NOTE — PLAN OF CARE
Notified afib rvr 140's for short time < minute bp 157/100 O2 sats 97% ra asymptomatic.      430 amNow patient sustaining afib rvr rates [id number] at times 180's bp 157/100 O2 sats 98% start diltiazem gtt per protocol w/bolus

## 2025-04-21 NOTE — PLAN OF CARE
Assumed care at 1930. Alert and orientated x 4. Maintaining oxygen saturations on room air. Afib on tele with rates ranging in the 100's-140's with rest. HR is nonsustained and patient denies any cardiac symptoms. Continent of bowel/bladder. LBM 4/18. No complaints of pain. Up with standby assist. Patient updated with plan of care. Call light within reach.     0240: Notified Hospitalist of sepsis BPA alert.   0250: Cardiology notified of patient's fluctuating resting HR. No new orders.  0430: Patient sustaining HR >120. Cardiology notified. Cardizem 5 mg bolus and infusion started.     Problem: Risk for Violence/Aggression  Goal: Absence of Violence/Aggression  Description: INTERVENTIONS: - Identify precipitating factors for behavior - Notify Charge RN/Provider - Consider decreasing stimulation - Consider distraction measures - Consider discussion with provider regarding prn meds  - Consider JOCELYN (Moderate Risk only) - Consider Code Support (High Risk only) - Consider room safety checks - Consider restraints  Outcome: Progressing  Problem: CARDIOVASCULAR - ADULT  Goal: Maintains optimal cardiac output and hemodynamic stability  Description: INTERVENTIONS:- Monitor vital signs, rhythm, and trends- Monitor for bleeding, hypotension and signs of decreased cardiac output- Evaluate effectiveness of vasoactive medications to optimize hemodynamic stability- Monitor arterial and/or venous puncture sites for bleeding and/or hematoma- Assess quality of pulses, skin color and temperature- Assess for signs of decreased coronary artery perfusion - ex. Angina- Evaluate fluid balance, assess for edema, trend weights  Outcome: Progressing  Goal: Absence of cardiac arrhythmias or at baseline  Description: INTERVENTIONS:- Continuous cardiac monitoring, monitor vital signs, obtain 12 lead EKG if indicated- Evaluate effectiveness of antiarrhythmic and heart rate control medications as ordered- Initiate emergency measures for life  threatening arrhythmias- Monitor electrolytes and administer replacement therapy as ordered  Outcome: Progressing  Problem: PAIN - ADULT  Goal: Verbalizes/displays adequate comfort level or patient's stated pain goal  Description: INTERVENTIONS:- Encourage pt to monitor pain and request assistance- Assess pain using appropriate pain scale- Administer analgesics based on type and severity of pain and evaluate response- Implement non-pharmacological measures as appropriate and evaluate response- Consider cultural and social influences on pain and pain management- Manage/alleviate anxiety- Utilize distraction and/or relaxation techniques- Monitor for opioid side effects- Notify MD/LIP if interventions unsuccessful or patient reports new pain- Anticipate increased pain with activity and pre-medicate as appropriate  Outcome: Progressing  Problem: RESPIRATORY - ADULT  Goal: Achieves optimal ventilation and oxygenation  Description: INTERVENTIONS:- Assess for changes in respiratory status- Assess for changes in mentation and behavior- Position to facilitate oxygenation and minimize respiratory effort- Oxygen supplementation based on oxygen saturation or ABGs- Provide Smoking Cessation handout, if applicable- Encourage broncho-pulmonary hygiene including cough, deep breathe, Incentive Spirometry- Assess the need for suctioning and perform as needed- Assess and instruct to report SOB or any respiratory difficulty- Respiratory Therapy support as indicated- Manage/alleviate anxiety- Monitor for signs/symptoms of CO2 retention  Outcome: Progressing  Problem: Diabetes/Glucose Control  Goal: Glucose maintained within prescribed range  Description: INTERVENTIONS:- Monitor Blood Glucose as ordered- Assess for signs and symptoms of hyperglycemia and hypoglycemia- Administer ordered medications to maintain glucose within target range- Assess barriers to adequate nutritional intake and initiate nutrition consult as needed- Instruct  patient on self management of diabetes  Outcome: Progressing

## 2025-04-21 NOTE — PLAN OF CARE
Assumed care of pt around 0730  Isolation for cdiff  AxO x4, R/A, up SBA  A fib on tele  Pt denies any pain  Plan: HR control, Po vanco, resume anticoagulation when able  Continent of bowel and bladder  Fall precautions, pt updated on plan of care          Problem: Risk for Violence/Aggression  Goal: Absence of Violence/Aggression  Description: INTERVENTIONS: - Identify precipitating factors for behavior - Notify Charge RN/Provider - Consider decreasing stimulation - Consider distraction measures - Consider discussion with provider regarding prn meds  - Consider JOCELYN (Moderate Risk only) - Consider Code Support (High Risk only) - Consider room safety checks - Consider restraints  Outcome: Progressing     Problem: CARDIOVASCULAR - ADULT  Goal: Maintains optimal cardiac output and hemodynamic stability  Description: INTERVENTIONS:- Monitor vital signs, rhythm, and trends- Monitor for bleeding, hypotension and signs of decreased cardiac output- Evaluate effectiveness of vasoactive medications to optimize hemodynamic stability- Monitor arterial and/or venous puncture sites for bleeding and/or hematoma- Assess quality of pulses, skin color and temperature- Assess for signs of decreased coronary artery perfusion - ex. Angina- Evaluate fluid balance, assess for edema, trend weights  Outcome: Progressing  Goal: Absence of cardiac arrhythmias or at baseline  Description: INTERVENTIONS:- Continuous cardiac monitoring, monitor vital signs, obtain 12 lead EKG if indicated- Evaluate effectiveness of antiarrhythmic and heart rate control medications as ordered- Initiate emergency measures for life threatening arrhythmias- Monitor electrolytes and administer replacement therapy as ordered  Outcome: Progressing     Problem: PAIN - ADULT  Goal: Verbalizes/displays adequate comfort level or patient's stated pain goal  Description: INTERVENTIONS:- Encourage pt to monitor pain and request assistance- Assess pain using appropriate pain  Encounter Date: 9/24/2022       History     Chief Complaint   Patient presents with    Knee Injury     Right knee hyperextended the pt fell on it during football game about 45 min pta. Pms intact distal to injury.      Patient complains of right knee pain after a football injury today.      Review of patient's allergies indicates:  No Known Allergies  Past Medical History:   Diagnosis Date    Seasonal allergies      No past surgical history on file.  Family History   Problem Relation Age of Onset    Cancer Neg Hx      Social History     Tobacco Use    Smoking status: Never    Smokeless tobacco: Never   Substance Use Topics    Alcohol use: No    Drug use: No     Review of Systems   Constitutional:  Negative for fever.   HENT:  Negative for sore throat.    Respiratory:  Negative for shortness of breath.    Cardiovascular:  Negative for chest pain.   Gastrointestinal:  Negative for nausea.   Genitourinary:  Negative for dysuria.   Musculoskeletal:  Negative for back pain.   Skin:  Negative for rash.   Neurological:  Negative for weakness.   Hematological:  Does not bruise/bleed easily.     Physical Exam     Initial Vitals [09/24/22 1231]   BP Pulse Resp Temp SpO2   (!) 123/67 (!) 107 20 99 °F (37.2 °C) 98 %      MAP       --         Physical Exam    Constitutional: He appears well-developed and well-nourished. He is active. No distress.   HENT:   Mouth/Throat: Mucous membranes are dry.   Eyes: Conjunctivae are normal.   Neck: Neck supple.   Normal range of motion.  Cardiovascular:  Normal rate and regular rhythm.        Pulses are strong and palpable.    No murmur heard.  Pulmonary/Chest: Effort normal and breath sounds normal. No respiratory distress. He has no wheezes.   Abdominal: Abdomen is soft. He exhibits no distension. There is no abdominal tenderness. There is no guarding.   Musculoskeletal:         General: No tenderness, deformity or signs of injury. Normal range of motion.      Cervical back: Normal range  of motion and neck supple.      Comments: Right knee superior swelling and TTP     Neurological: He is alert. He has normal strength. GCS score is 15. GCS eye subscore is 4. GCS verbal subscore is 5. GCS motor subscore is 6.   Skin: Skin is warm and dry. Capillary refill takes less than 2 seconds. No rash noted.   Psychiatric: He has a normal mood and affect. His behavior is normal.       ED Course   Procedures  Labs Reviewed - No data to display       Imaging Results              X-Ray Knee 1 or 2 View Right (Final result)  Result time 09/24/22 13:54:55      Final result by WILLARD Lyons Sr., MD (09/24/22 13:54:55)                   Impression:      Normal study.      Electronically signed by: Cristi Lyons MD  Date:    09/24/2022  Time:    13:54               Narrative:    EXAMINATION:  XR KNEE 1 OR 2 VIEW RIGHT    CLINICAL HISTORY:  Unspecified injury of unspecified lower leg, initial encounter    COMPARISON:  None    FINDINGS:  There is no fracture. There is no dislocation.                                       Medications - No data to display                           Clinical Impression:   Final diagnoses:  [S89.90XA] Knee injury  [S80.01XA] Contusion of right knee, initial encounter (Primary)        ED Disposition Condition    Discharge Stable          ED Prescriptions    None       Follow-up Information       Follow up With Specialties Details Why Contact Info    Aishwarya Hubbard MD Pediatrics Schedule an appointment as soon as possible for a visit  As needed [id number]  [address]  [facility]  [facility] [address]  [phone]               Nicolas Eastman NP  09/25/22 2115     scale- Administer analgesics based on type and severity of pain and evaluate response- Implement non-pharmacological measures as appropriate and evaluate response- Consider cultural and social influences on pain and pain management- Manage/alleviate anxiety- Utilize distraction and/or relaxation techniques- Monitor for opioid side effects- Notify MD/LIP if interventions unsuccessful or patient reports new pain- Anticipate increased pain with activity and pre-medicate as appropriate  Outcome: Progressing     Problem: RESPIRATORY - ADULT  Goal: Achieves optimal ventilation and oxygenation  Description: INTERVENTIONS:- Assess for changes in respiratory status- Assess for changes in mentation and behavior- Position to facilitate oxygenation and minimize respiratory effort- Oxygen supplementation based on oxygen saturation or ABGs- Provide Smoking Cessation handout, if applicable- Encourage broncho-pulmonary hygiene including cough, deep breathe, Incentive Spirometry- Assess the need for suctioning and perform as needed- Assess and instruct to report SOB or any respiratory difficulty- Respiratory Therapy support as indicated- Manage/alleviate anxiety- Monitor for signs/symptoms of CO2 retention  Outcome: Progressing     Problem: Diabetes/Glucose Control  Goal: Glucose maintained within prescribed range  Description: INTERVENTIONS:- Monitor Blood Glucose as ordered- Assess for signs and symptoms of hyperglycemia and hypoglycemia- Administer ordered medications to maintain glucose within target range- Assess barriers to adequate nutritional intake and initiate nutrition consult as needed- Instruct patient on self management of diabetes  Outcome: Progressing

## 2025-04-22 LAB
ANION GAP SERPL CALC-SCNC: 10 MMOL/L (ref 0–18)
APTT PPP: 42.6 SECONDS (ref 23–36)
APTT PPP: 51.5 SECONDS (ref 23–36)
BASOPHILS # BLD AUTO: 0.04 X10(3) UL (ref 0–0.2)
BASOPHILS # BLD AUTO: 0.05 X10(3) UL (ref 0–0.2)
BASOPHILS # BLD AUTO: 0.07 X10(3) UL (ref 0–0.2)
BASOPHILS NFR BLD AUTO: 0.7 %
BASOPHILS NFR BLD AUTO: 0.9 %
BASOPHILS NFR BLD AUTO: 1 %
BUN BLD-MCNC: 17 MG/DL (ref 9–23)
CALCIUM BLD-MCNC: 8.8 MG/DL (ref 8.7–10.6)
CHLORIDE SERPL-SCNC: 105 MMOL/L (ref 98–112)
CO2 SERPL-SCNC: 24 MMOL/L (ref 21–32)
CREAT BLD-MCNC: 1.04 MG/DL (ref 0.7–1.3)
EGFRCR SERPLBLD CKD-EPI 2021: 71 ML/MIN/1.73M2 (ref 60–?)
EOSINOPHIL # BLD AUTO: 0.21 X10(3) UL (ref 0–0.7)
EOSINOPHIL # BLD AUTO: 0.23 X10(3) UL (ref 0–0.7)
EOSINOPHIL # BLD AUTO: 0.25 X10(3) UL (ref 0–0.7)
EOSINOPHIL NFR BLD AUTO: 3.4 %
EOSINOPHIL NFR BLD AUTO: 3.6 %
EOSINOPHIL NFR BLD AUTO: 3.9 %
ERYTHROCYTE [DISTWIDTH] IN BLOOD BY AUTOMATED COUNT: 15.8 %
ERYTHROCYTE [DISTWIDTH] IN BLOOD BY AUTOMATED COUNT: 15.9 %
ERYTHROCYTE [DISTWIDTH] IN BLOOD BY AUTOMATED COUNT: 16 %
GLUCOSE BLD-MCNC: 168 MG/DL (ref 70–99)
GLUCOSE BLD-MCNC: 183 MG/DL (ref 70–99)
GLUCOSE BLD-MCNC: 193 MG/DL (ref 70–99)
GLUCOSE BLD-MCNC: 256 MG/DL (ref 70–99)
GLUCOSE BLD-MCNC: 274 MG/DL (ref 70–99)
GLUCOSE BLD-MCNC: 314 MG/DL (ref 70–99)
HCT VFR BLD AUTO: 26.4 % (ref 39–53)
HCT VFR BLD AUTO: 26.7 % (ref 39–53)
HCT VFR BLD AUTO: 26.9 % (ref 39–53)
HGB BLD-MCNC: 9.1 G/DL (ref 13–17.5)
HGB BLD-MCNC: 9.2 G/DL (ref 13–17.5)
HGB BLD-MCNC: 9.2 G/DL (ref 13–17.5)
IMM GRANULOCYTES # BLD AUTO: 0.09 X10(3) UL (ref 0–1)
IMM GRANULOCYTES # BLD AUTO: 0.1 X10(3) UL (ref 0–1)
IMM GRANULOCYTES # BLD AUTO: 0.12 X10(3) UL (ref 0–1)
IMM GRANULOCYTES NFR BLD: 1.5 %
IMM GRANULOCYTES NFR BLD: 1.6 %
IMM GRANULOCYTES NFR BLD: 1.7 %
LYMPHOCYTES # BLD AUTO: 1.38 X10(3) UL (ref 1–4)
LYMPHOCYTES # BLD AUTO: 1.58 X10(3) UL (ref 1–4)
LYMPHOCYTES # BLD AUTO: 1.69 X10(3) UL (ref 1–4)
LYMPHOCYTES NFR BLD AUTO: 23.2 %
LYMPHOCYTES NFR BLD AUTO: 23.3 %
LYMPHOCYTES NFR BLD AUTO: 27.1 %
MAGNESIUM SERPL-MCNC: 1.7 MG/DL (ref 1.6–2.6)
MCH RBC QN AUTO: 29.2 PG (ref 26–34)
MCH RBC QN AUTO: 29.8 PG (ref 26–34)
MCH RBC QN AUTO: 30 PG (ref 26–34)
MCHC RBC AUTO-ENTMCNC: 33.8 G/DL (ref 31–37)
MCHC RBC AUTO-ENTMCNC: 34.5 G/DL (ref 31–37)
MCHC RBC AUTO-ENTMCNC: 34.8 G/DL (ref 31–37)
MCV RBC AUTO: 86 FL (ref 80–100)
MCV RBC AUTO: 86.2 FL (ref 80–100)
MCV RBC AUTO: 86.4 FL (ref 80–100)
MONOCYTES # BLD AUTO: 0.41 X10(3) UL (ref 0.1–1)
MONOCYTES # BLD AUTO: 0.42 X10(3) UL (ref 0.1–1)
MONOCYTES # BLD AUTO: 0.51 X10(3) UL (ref 0.1–1)
MONOCYTES NFR BLD AUTO: 6.9 %
MONOCYTES NFR BLD AUTO: 7 %
MONOCYTES NFR BLD AUTO: 7.2 %
NEUTROPHILS # BLD AUTO: 3.48 X10 (3) UL (ref 1.5–7.7)
NEUTROPHILS # BLD AUTO: 3.48 X10(3) UL (ref 1.5–7.7)
NEUTROPHILS # BLD AUTO: 3.77 X10 (3) UL (ref 1.5–7.7)
NEUTROPHILS # BLD AUTO: 3.77 X10(3) UL (ref 1.5–7.7)
NEUTROPHILS # BLD AUTO: 4.66 X10 (3) UL (ref 1.5–7.7)
NEUTROPHILS # BLD AUTO: 4.66 X10(3) UL (ref 1.5–7.7)
NEUTROPHILS NFR BLD AUTO: 59.7 %
NEUTROPHILS NFR BLD AUTO: 63.5 %
NEUTROPHILS NFR BLD AUTO: 63.8 %
OSMOLALITY SERPL CALC.SUM OF ELEC: 294 MOSM/KG (ref 275–295)
PLATELET # BLD AUTO: 103 10(3)UL (ref 150–450)
PLATELET # BLD AUTO: 115 10(3)UL (ref 150–450)
PLATELET # BLD AUTO: 118 10(3)UL (ref 150–450)
PLATELET # BLD AUTO: 98 10(3)UL (ref 150–450)
PLATELETS.RETICULATED NFR BLD AUTO: 3 % (ref 0–7)
PLATELETS.RETICULATED NFR BLD AUTO: 4.1 % (ref 0–7)
POTASSIUM SERPL-SCNC: 3.7 MMOL/L (ref 3.5–5.1)
POTASSIUM SERPL-SCNC: 3.7 MMOL/L (ref 3.5–5.1)
RBC # BLD AUTO: 3.07 X10(6)UL (ref 3.8–5.8)
RBC # BLD AUTO: 3.09 X10(6)UL (ref 3.8–5.8)
RBC # BLD AUTO: 3.12 X10(6)UL (ref 3.8–5.8)
SODIUM SERPL-SCNC: 139 MMOL/L (ref 136–145)
WBC # BLD AUTO: 5.8 X10(3) UL (ref 4–11)
WBC # BLD AUTO: 5.9 X10(3) UL (ref 4–11)
WBC # BLD AUTO: 7.3 X10(3) UL (ref 4–11)

## 2025-04-22 PROCEDURE — 83735 ASSAY OF MAGNESIUM: CPT | Performed by: HOSPITALIST

## 2025-04-22 PROCEDURE — 85730 THROMBOPLASTIN TIME PARTIAL: CPT | Performed by: STUDENT IN AN ORGANIZED HEALTH CARE EDUCATION/TRAINING PROGRAM

## 2025-04-22 PROCEDURE — 85025 COMPLETE CBC W/AUTO DIFF WBC: CPT | Performed by: STUDENT IN AN ORGANIZED HEALTH CARE EDUCATION/TRAINING PROGRAM

## 2025-04-22 PROCEDURE — 82962 GLUCOSE BLOOD TEST: CPT

## 2025-04-22 PROCEDURE — 84132 ASSAY OF SERUM POTASSIUM: CPT | Performed by: STUDENT IN AN ORGANIZED HEALTH CARE EDUCATION/TRAINING PROGRAM

## 2025-04-22 PROCEDURE — 85049 AUTOMATED PLATELET COUNT: CPT | Performed by: NURSE PRACTITIONER

## 2025-04-22 PROCEDURE — 85730 THROMBOPLASTIN TIME PARTIAL: CPT | Performed by: NURSE PRACTITIONER

## 2025-04-22 PROCEDURE — 80048 BASIC METABOLIC PNL TOTAL CA: CPT | Performed by: HOSPITALIST

## 2025-04-22 RX ORDER — POTASSIUM CHLORIDE 1500 MG/1
40 TABLET, EXTENDED RELEASE ORAL ONCE
Status: COMPLETED | OUTPATIENT
Start: 2025-04-22 | End: 2025-04-22

## 2025-04-22 RX ORDER — MAGNESIUM OXIDE 400 MG/1
400 TABLET ORAL ONCE
Status: COMPLETED | OUTPATIENT
Start: 2025-04-22 | End: 2025-04-22
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
## 2025-04-22 NOTE — PLAN OF CARE
Assumed care of pt around 0730  Isolation for cdiff  AxO x4, R/A, up SBA  A fib on tele  Pt denies any pain  Plan: HR control, Po vanco, Heparin gtt, redraw cbc 1400  Continent of bowel and bladder  Fall precautions, pt updated on plan of care          Problem: Risk for Violence/Aggression  Goal: Absence of Violence/Aggression  Description: INTERVENTIONS: - Identify precipitating factors for behavior - Notify Charge RN/Provider - Consider decreasing stimulation - Consider distraction measures - Consider discussion with provider regarding prn meds  - Consider JOCELYN (Moderate Risk only) - Consider Code Support (High Risk only) - Consider room safety checks - Consider restraints  Outcome: Progressing     Problem: CARDIOVASCULAR - ADULT  Goal: Maintains optimal cardiac output and hemodynamic stability  Description: INTERVENTIONS:- Monitor vital signs, rhythm, and trends- Monitor for bleeding, hypotension and signs of decreased cardiac output- Evaluate effectiveness of vasoactive medications to optimize hemodynamic stability- Monitor arterial and/or venous puncture sites for bleeding and/or hematoma- Assess quality of pulses, skin color and temperature- Assess for signs of decreased coronary artery perfusion - ex. Angina- Evaluate fluid balance, assess for edema, trend weights  Outcome: Progressing  Goal: Absence of cardiac arrhythmias or at baseline  Description: INTERVENTIONS:- Continuous cardiac monitoring, monitor vital signs, obtain 12 lead EKG if indicated- Evaluate effectiveness of antiarrhythmic and heart rate control medications as ordered- Initiate emergency measures for life threatening arrhythmias- Monitor electrolytes and administer replacement therapy as ordered  Outcome: Progressing     Problem: PAIN - ADULT  Goal: Verbalizes/displays adequate comfort level or patient's stated pain goal  Description: INTERVENTIONS:- Encourage pt to monitor pain and request assistance- Assess pain using appropriate pain  scale- Administer analgesics based on type and severity of pain and evaluate response- Implement non-pharmacological measures as appropriate and evaluate response- Consider cultural and social influences on pain and pain management- Manage/alleviate anxiety- Utilize distraction and/or relaxation techniques- Monitor for opioid side effects- Notify MD/LIP if interventions unsuccessful or patient reports new pain- Anticipate increased pain with activity and pre-medicate as appropriate  Outcome: Progressing     Problem: RESPIRATORY - ADULT  Goal: Achieves optimal ventilation and oxygenation  Description: INTERVENTIONS:- Assess for changes in respiratory status- Assess for changes in mentation and behavior- Position to facilitate oxygenation and minimize respiratory effort- Oxygen supplementation based on oxygen saturation or ABGs- Provide Smoking Cessation handout, if applicable- Encourage broncho-pulmonary hygiene including cough, deep breathe, Incentive Spirometry- Assess the need for suctioning and perform as needed- Assess and instruct to report SOB or any respiratory difficulty- Respiratory Therapy support as indicated- Manage/alleviate anxiety- Monitor for signs/symptoms of CO2 retention  Outcome: Progressing     Problem: Diabetes/Glucose Control  Goal: Glucose maintained within prescribed range  Description: INTERVENTIONS:- Monitor Blood Glucose as ordered- Assess for signs and symptoms of hyperglycemia and hypoglycemia- Administer ordered medications to maintain glucose within target range- Assess barriers to adequate nutritional intake and initiate nutrition consult as needed- Instruct patient on self management of diabetes  Outcome: Progressing

## 2025-04-22 NOTE — PLAN OF CARE
Pt alert and oriented x4.  Up standby .  On room air.  AFIB on tele, heparin gtt infusing and titrated per protocol.  Pt had on episode of dark stool once.  No complaints of pain, sob, or chest pain/ discomfort.  Plan of care discussed with pt..  Falls and seizure precautions in place.  Call light within reach.  POC: Hgtt drip, p.o vanco.     Problem: CARDIOVASCULAR - ADULT  Goal: Maintains optimal cardiac output and hemodynamic stability  Description: INTERVENTIONS:- Monitor vital signs, rhythm, and trends- Monitor for bleeding, hypotension and signs of decreased cardiac output- Evaluate effectiveness of vasoactive medications to optimize hemodynamic stability- Monitor arterial and/or venous puncture sites for bleeding and/or hematoma- Assess quality of pulses, skin color and temperature- Assess for signs of decreased coronary artery perfusion - ex. Angina- Evaluate fluid balance, assess for edema, trend weights  Outcome: Progressing  Goal: Absence of cardiac arrhythmias or at baseline  Description: INTERVENTIONS:- Continuous cardiac monitoring, monitor vital signs, obtain 12 lead EKG if indicated- Evaluate effectiveness of antiarrhythmic and heart rate control medications as ordered- Initiate emergency measures for life threatening arrhythmias- Monitor electrolytes and administer replacement therapy as ordered  Outcome: Progressing     Problem: PAIN - ADULT  Goal: Verbalizes/displays adequate comfort level or patient's stated pain goal  Description: INTERVENTIONS:- Encourage pt to monitor pain and request assistance- Assess pain using appropriate pain scale- Administer analgesics based on type and severity of pain and evaluate response- Implement non-pharmacological measures as appropriate and evaluate response- Consider cultural and social influences on pain and pain management- Manage/alleviate anxiety- Utilize distraction and/or relaxation techniques- Monitor for opioid side effects- Notify MD/LIP if  interventions unsuccessful or patient reports new pain- Anticipate increased pain with activity and pre-medicate as appropriate  Outcome: Progressing     Problem: RESPIRATORY - ADULT  Goal: Achieves optimal ventilation and oxygenation  Description: INTERVENTIONS:- Assess for changes in respiratory status- Assess for changes in mentation and behavior- Position to facilitate oxygenation and minimize respiratory effort- Oxygen supplementation based on oxygen saturation or ABGs- Provide Smoking Cessation handout, if applicable- Encourage broncho-pulmonary hygiene including cough, deep breathe, Incentive Spirometry- Assess the need for suctioning and perform as needed- Assess and instruct to report SOB or any respiratory difficulty- Respiratory Therapy support as indicated- Manage/alleviate anxiety- Monitor for signs/symptoms of CO2 retention  Outcome: Progressing     Problem: Diabetes/Glucose Control  Goal: Glucose maintained within prescribed range  Description: INTERVENTIONS:- Monitor Blood Glucose as ordered- Assess for signs and symptoms of hyperglycemia and hypoglycemia- Administer ordered medications to maintain glucose within target range- Assess barriers to adequate nutritional intake and initiate nutrition consult as needed- Instruct patient on self management of diabetes  Outcome: Progressing

## 2025-04-22 NOTE — PROGRESS NOTES
Progress Note  Shaq Song Patient Status:  Inpatient    10/2/1940 MRN [id number]   Location [facility] 8NE-A Attending Carlos Collins*   Hosp Day # 4 PCP Jose Palencia MD     Subjective:  Continues to feel well without symptoms. Although still in afib HR's well controlled in the 70's. 1 black solid BM yesterday. Heparin resumed last night. Hgbs have been stable in the ~9's  Denies bleeding    Objective:  Physical Exam:   /84 (BP Location: Left arm)   Pulse 68   Temp 98 °F (36.7 °C) (Oral)   Resp 16   Wt 153 lb 14.1 oz (69.8 kg)   SpO2 94%   BMI 24.10 kg/m²   Temp (24hrs), Av.7 °F (36.5 °C), Min:97.2 °F (36.2 °C), Max:98 °F (36.7 °C)       Intake/Output Summary (Last 24 hours) at 2025 0950  Last data filed at 2025 0518  Gross per 24 hour   Intake 440 ml   Output 325 ml   Net 115 ml     Wt Readings from Last 3 Encounters:   25 153 lb 14.1 oz (69.8 kg)   25 162 lb (73.5 kg)   25 165 lb (74.8 kg)     Telemetry: Atrial fibrillation ventricular rates in the 70's  General: Thin male who is alert and oriented in no apparent distress lying comfortably in bed. Mild pallor. No jaundice.  HEENT: No focal deficits.  Neck: No JVD, carotids 2+ no bruits.  Cardiac: Irregularly irregular, S1, S2 normal, rub or gallop.  Lungs: Clear without wheezes, rales, rhonchi or dullness.  Normal excursions and effort.  Abdomen: Soft, non-tender.   Extremities: Without clubbing, cyanosis or edema.  Peripheral pulses are 2+.  Neurologic: Alert and oriented, normal affect.  Skin: Warm and dry.        Intake/Output:    Intake/Output Summary (Last 24 hours) at 2025 0950  Last data filed at 2025 0518  Gross per 24 hour   Intake 440 ml   Output 325 ml   Net 115 ml       Last 3 Weights   25 0518 153 lb 14.1 oz (69.8 kg)   25 0302 159 lb 2.8 oz (72.2 kg)   25 1014 162 lb (73.5 kg)   25 0511 165 lb (74.8 kg)       Labs:  Recent Labs   Lab 25  1053  04/21/25  1803 04/22/25  0639   * 185* 183*   BUN 17 17 17   CREATSERUM 1.05 1.04 1.04   EGFRCR 70 71 71   CA 8.5* 8.6* 8.8    138 139   K 4.1 3.7 3.7  3.7    104 105   CO2 25.0 25.0 24.0     Recent Labs   Lab 04/21/25  1058 04/21/25  1803 04/22/25  0014 04/22/25  0639   RBC 3.32* 3.35* 3.09* 3.07*   HGB 9.9* 9.8* 9.2* 9.2*   HCT 28.5* 29.1* 26.7* 26.4*   MCV 85.8 86.9 86.4 86.0   MCH 29.8 29.3 29.8 30.0   MCHC 34.7 33.7 34.5 34.8   RDW 15.7 15.6 15.9 15.8   NEPRELIM 4.17  --  4.66 3.48   WBC 6.3 6.8 7.3 5.8   .0* 114.0* 115.0* 98.0*  103.0*         Recent Labs   Lab 04/17/25  2155 04/18/25  0608 04/19/25  0659   TROPHS 121* 8,405* 13,626*       Diagnostics:   ECHOCARDIOGRAM 4/21:  1. Left ventricle: The cavity size was normal. Wall thickness was normal.       Systolic function was normal. The estimated ejection fraction was       50-55%, by 3D assessment. Unable to assess LV diastolic function due to heart rhythm.   2.  Left ventricle: There is hypokinesis of the basal-mid inferoseptal and       entire inferior walls.   3.  Right ventricle: Systolic function was normal.   4.  Left atrium: The left atrial volume was markedly increased.   5.  Right atrium: The atrium was moderately dilated.   6.  Aortic valve: The valve was trileaflet. The leaflets were mildly       calcified. There was mild regurgitation.   7.  Aortic root: The aortic root was 4.1cm diameter.   8.  Ascending aorta: The ascending aorta was 4.2cm diameter.   9.  Mitral valve: Prolapse, involving the posterior leaflet. There was       moderate regurgitation.   10. Pulmonary arteries: Systolic pressure was mildly increased, in the range       of 35mm Hg to 40mm Hg.   Impressions:  This study is compared with previous dated 12/19/2019: Wall   motion abnormality is new. Mitral regurgitation was noted previously.   *       Medications:  Scheduled Medications[1]  Medication Infusions[2]    Assessment/Plan:    Acute blood loss  with pancreatic cancer and concern for erosion into the stomach  Presenting hgb 5.6. maintain Hgb > 7  Heparin resumed 4/22- hgb stable thus far- black BM  yesterday  Denies recurrent bleeding  NSTEMI likely combination of demand mediated presented with above and ischemic CAD  LVEF remains preserved, hypokinesis of basal-mid inferoseptal and inferior walls  Poor interventional candidate given life threatening bleeding and per GI limited endoscopic options if bleeding reoccurs. Given lack of symptoms and significant risk of AC, he  had remained off of AC - reasonable to resume AC per GI  Likely degree of CAD given age and eventual ischemic evaluation is warranted  Hx of pAF presenting in Afib RVR  IV heparin resumed 4/21 PM  Currently ventricular rates controlled  lopressor to 100 mg BID and 60 mg QID of diltiazem  LVEF preserved   Moderate to severe mitral regurgitation, mitral valve prolapse w/ partial flail P2 scallop -intervention delayed by pancreatic cancer diagnosis  Compensated throughout hospital stay  Ascending aorta dilation, 4.4 cm  Pancreatic cancer on chemotherapy  HTN  BP at goal  HLD  DM II  Hx of remote DVT; provoked  Hx of PE in March  Negative CTA chest 4/17      PLAN  Remains chest pain free  IV heparin resumed 4/21 PM NSTEMI, hx of PE/DVT and active malignancy, and afib with elevated CHADs-VASc  Hgb has remained stable - trend  Metoprolol 100 mg BID and diltiazem 60 QID - adjust to XR at discharge  Remains very poor interventional candidate given acute bleeding and concern for malignancy contributing - eventual ischemic evaluation  If hgb remains stable this afternoon it may be reasonable to adjust to Eliquis and if PO OAC is tolerated we can begin to consider discharge with close follow up- will review with my attending physician    Lucas Harris PA-C  4/22/2025  09:53 AM       =======================================================  Patient seen and examined independently.  Note reviewed and  labs reviewed.  Agree with above assessment and plan.    Physical exam:  GEN: Alert and orient, no acute distress  CV: rrr, S1S2, no m/g/r  LUNGS: CTA b/l with no obvious wheezing, rales or rhonchi  EXT: no b/l LE edema  ABD: soft, NTND    I have personally performed the medical decision making in its entirety. My additions include: 84 year old male with pancreatic cancer with concern of cancer eroding into stomach with acute blood loss anemia.  NSTEMI and known significant MR with partial flail of posterior leaflets.  Candidate for medical management of cardiac issues (reviewed with primary cardiologist, Dr. Teo Acosta).  Stable on trial of IV heparin and would start Eliquis tonight and re-evaluate tomorrow before considering discharge.    D/w AMADOR Mitchell MD         [1]    dilTIAZem  60 mg Oral 4 times per day    metoprolol tartrate  100 mg Oral 2x Daily(Beta Blocker)    pantoprazole  40 mg Oral BID AC    vancomycin  125 mg Oral 4 times per day    finasteride  5 mg Oral Daily    insulin aspart  0.2 Units/kg Subcutaneous Once    amitriptyline  10 mg Oral QPM    sodium chloride   Intravenous Once    insulin aspart  2-10 Units Subcutaneous TID AC and HS   [2]    dilTIAZem Stopped (04/21/25 1816)    continuous dose heparin 1,000 Units/hr (04/22/25 0110)

## 2025-04-23 VITALS
TEMPERATURE: 98 F | OXYGEN SATURATION: 99 % | BODY MASS INDEX: 24 KG/M2 | DIASTOLIC BLOOD PRESSURE: 72 MMHG | RESPIRATION RATE: 16 BRPM | WEIGHT: 153.88 LBS | HEART RATE: 74 BPM | SYSTOLIC BLOOD PRESSURE: 131 MMHG

## 2025-04-23 LAB
ANION GAP SERPL CALC-SCNC: 10 MMOL/L (ref 0–18)
BASOPHILS # BLD AUTO: 0.04 X10(3) UL (ref 0–0.2)
BASOPHILS # BLD AUTO: 0.07 X10(3) UL (ref 0–0.2)
BASOPHILS NFR BLD AUTO: 0.7 %
BASOPHILS NFR BLD AUTO: 0.8 %
BUN BLD-MCNC: 17 MG/DL (ref 9–23)
CALCIUM BLD-MCNC: 8.7 MG/DL (ref 8.7–10.6)
CHLORIDE SERPL-SCNC: 107 MMOL/L (ref 98–112)
CO2 SERPL-SCNC: 25 MMOL/L (ref 21–32)
CREAT BLD-MCNC: 1.14 MG/DL (ref 0.7–1.3)
EGFRCR SERPLBLD CKD-EPI 2021: 63 ML/MIN/1.73M2 (ref 60–?)
EOSINOPHIL # BLD AUTO: 0.15 X10(3) UL (ref 0–0.7)
EOSINOPHIL # BLD AUTO: 0.31 X10(3) UL (ref 0–0.7)
EOSINOPHIL NFR BLD AUTO: 2.8 %
EOSINOPHIL NFR BLD AUTO: 3.4 %
ERYTHROCYTE [DISTWIDTH] IN BLOOD BY AUTOMATED COUNT: 16.4 %
ERYTHROCYTE [DISTWIDTH] IN BLOOD BY AUTOMATED COUNT: 16.5 %
GLUCOSE BLD-MCNC: 135 MG/DL (ref 70–99)
GLUCOSE BLD-MCNC: 162 MG/DL (ref 70–99)
HCT VFR BLD AUTO: 25.6 % (ref 39–53)
HCT VFR BLD AUTO: 28 % (ref 39–53)
HGB BLD-MCNC: 8.7 G/DL (ref 13–17.5)
HGB BLD-MCNC: 9.6 G/DL (ref 13–17.5)
IMM GRANULOCYTES # BLD AUTO: 0.09 X10(3) UL (ref 0–1)
IMM GRANULOCYTES # BLD AUTO: 0.14 X10(3) UL (ref 0–1)
IMM GRANULOCYTES NFR BLD: 1.5 %
IMM GRANULOCYTES NFR BLD: 1.7 %
LYMPHOCYTES # BLD AUTO: 1.38 X10(3) UL (ref 1–4)
LYMPHOCYTES # BLD AUTO: 2.27 X10(3) UL (ref 1–4)
LYMPHOCYTES NFR BLD AUTO: 24.6 %
LYMPHOCYTES NFR BLD AUTO: 25.4 %
MAGNESIUM SERPL-MCNC: 1.8 MG/DL (ref 1.6–2.6)
MCH RBC QN AUTO: 29.7 PG (ref 26–34)
MCH RBC QN AUTO: 29.7 PG (ref 26–34)
MCHC RBC AUTO-ENTMCNC: 34 G/DL (ref 31–37)
MCHC RBC AUTO-ENTMCNC: 34.3 G/DL (ref 31–37)
MCV RBC AUTO: 86.7 FL (ref 80–100)
MCV RBC AUTO: 87.4 FL (ref 80–100)
MONOCYTES # BLD AUTO: 0.5 X10(3) UL (ref 0.1–1)
MONOCYTES # BLD AUTO: 0.67 X10(3) UL (ref 0.1–1)
MONOCYTES NFR BLD AUTO: 7.3 %
MONOCYTES NFR BLD AUTO: 9.2 %
NEUTROPHILS # BLD AUTO: 3.28 X10 (3) UL (ref 1.5–7.7)
NEUTROPHILS # BLD AUTO: 3.28 X10(3) UL (ref 1.5–7.7)
NEUTROPHILS # BLD AUTO: 5.75 X10 (3) UL (ref 1.5–7.7)
NEUTROPHILS # BLD AUTO: 5.75 X10(3) UL (ref 1.5–7.7)
NEUTROPHILS NFR BLD AUTO: 60.2 %
NEUTROPHILS NFR BLD AUTO: 62.4 %
OSMOLALITY SERPL CALC.SUM OF ELEC: 299 MOSM/KG (ref 275–295)
PLATELET # BLD AUTO: 108 10(3)UL (ref 150–450)
PLATELET # BLD AUTO: 118 10(3)UL (ref 150–450)
PLATELET # BLD AUTO: 169 10(3)UL (ref 150–450)
PLATELETS.RETICULATED NFR BLD AUTO: 3.3 % (ref 0–7)
POTASSIUM SERPL-SCNC: 3.7 MMOL/L (ref 3.5–5.1)
POTASSIUM SERPL-SCNC: 3.7 MMOL/L (ref 3.5–5.1)
RBC # BLD AUTO: 2.93 X10(6)UL (ref 3.8–5.8)
RBC # BLD AUTO: 3.23 X10(6)UL (ref 3.8–5.8)
SODIUM SERPL-SCNC: 142 MMOL/L (ref 136–145)
WBC # BLD AUTO: 5.4 X10(3) UL (ref 4–11)
WBC # BLD AUTO: 9.2 X10(3) UL (ref 4–11)

## 2025-04-23 PROCEDURE — 82962 GLUCOSE BLOOD TEST: CPT

## 2025-04-23 PROCEDURE — 84132 ASSAY OF SERUM POTASSIUM: CPT | Performed by: STUDENT IN AN ORGANIZED HEALTH CARE EDUCATION/TRAINING PROGRAM

## 2025-04-23 PROCEDURE — 85025 COMPLETE CBC W/AUTO DIFF WBC: CPT | Performed by: STUDENT IN AN ORGANIZED HEALTH CARE EDUCATION/TRAINING PROGRAM

## 2025-04-23 PROCEDURE — 83735 ASSAY OF MAGNESIUM: CPT | Performed by: STUDENT IN AN ORGANIZED HEALTH CARE EDUCATION/TRAINING PROGRAM

## 2025-04-23 PROCEDURE — 85049 AUTOMATED PLATELET COUNT: CPT | Performed by: NURSE PRACTITIONER

## 2025-04-23 PROCEDURE — 80048 BASIC METABOLIC PNL TOTAL CA: CPT | Performed by: HOSPITALIST

## 2025-04-23 PROCEDURE — 85025 COMPLETE CBC W/AUTO DIFF WBC: CPT | Performed by: INTERNAL MEDICINE

## 2025-04-23 RX ORDER — MAGNESIUM OXIDE 400 MG/1
400 TABLET ORAL ONCE
Status: COMPLETED | OUTPATIENT
Start: 2025-04-23 | End: 2025-04-23

## 2025-04-23 RX ORDER — DILTIAZEM HYDROCHLORIDE 120 MG/1
120 CAPSULE, COATED, EXTENDED RELEASE ORAL DAILY
Qty: 90 CAPSULE | Refills: 0 | Status: SHIPPED | OUTPATIENT
Start: 2025-04-23

## 2025-04-23 RX ORDER — FUROSEMIDE 20 MG/1
20 TABLET ORAL DAILY
COMMUNITY

## 2025-04-23 RX ORDER — DILTIAZEM HYDROCHLORIDE 120 MG/1
120 CAPSULE, EXTENDED RELEASE ORAL DAILY
Status: DISCONTINUED | OUTPATIENT
Start: 2025-04-23 | End: 2025-04-23

## 2025-04-23 RX ORDER — METOPROLOL TARTRATE 100 MG/1
100 TABLET ORAL
Qty: 60 TABLET | Refills: 2 | Status: SHIPPED | OUTPATIENT
Start: 2025-04-23

## 2025-04-23 RX ORDER — POTASSIUM CHLORIDE 1500 MG/1
40 TABLET, EXTENDED RELEASE ORAL ONCE
Status: COMPLETED | OUTPATIENT
Start: 2025-04-23 | End: 2025-04-23

## 2025-04-23 NOTE — PLAN OF CARE
A&Ox4. Contact plus isolation for cdfiff. RA. Afib on telemetry. Pt denies chest pain or shortness of breath. Continent of bowel and bladder. No complaints of pain. Up SBA. In bed with fall precautions in place. Discussed plan of care with patient. Patient verbalizes understanding.    Plan: monitor CBC q8hrs, resume eliquis, PO vanco.     Problem: Risk for Violence/Aggression  Goal: Absence of Violence/Aggression  Description: INTERVENTIONS: - Identify precipitating factors for behavior - Notify Charge RN/Provider - Consider decreasing stimulation - Consider distraction measures - Consider discussion with provider regarding prn meds  - Consider JOCELYN (Moderate Risk only) - Consider Code Support (High Risk only) - Consider room safety checks - Consider restraints  Outcome: Progressing     Problem: CARDIOVASCULAR - ADULT  Goal: Maintains optimal cardiac output and hemodynamic stability  Description: INTERVENTIONS:- Monitor vital signs, rhythm, and trends- Monitor for bleeding, hypotension and signs of decreased cardiac output- Evaluate effectiveness of vasoactive medications to optimize hemodynamic stability- Monitor arterial and/or venous puncture sites for bleeding and/or hematoma- Assess quality of pulses, skin color and temperature- Assess for signs of decreased coronary artery perfusion - ex. Angina- Evaluate fluid balance, assess for edema, trend weights  Outcome: Progressing  Goal: Absence of cardiac arrhythmias or at baseline  Description: INTERVENTIONS:- Continuous cardiac monitoring, monitor vital signs, obtain 12 lead EKG if indicated- Evaluate effectiveness of antiarrhythmic and heart rate control medications as ordered- Initiate emergency measures for life threatening arrhythmias- Monitor electrolytes and administer replacement therapy as ordered  Outcome: Progressing     Problem: PAIN - ADULT  Goal: Verbalizes/displays adequate comfort level or patient's stated pain goal  Description: INTERVENTIONS:-  Encourage pt to monitor pain and request assistance- Assess pain using appropriate pain scale- Administer analgesics based on type and severity of pain and evaluate response- Implement non-pharmacological measures as appropriate and evaluate response- Consider cultural and social influences on pain and pain management- Manage/alleviate anxiety- Utilize distraction and/or relaxation techniques- Monitor for opioid side effects- Notify MD/LIP if interventions unsuccessful or patient reports new pain- Anticipate increased pain with activity and pre-medicate as appropriate  Outcome: Progressing     Problem: RESPIRATORY - ADULT  Goal: Achieves optimal ventilation and oxygenation  Description: INTERVENTIONS:- Assess for changes in respiratory status- Assess for changes in mentation and behavior- Position to facilitate oxygenation and minimize respiratory effort- Oxygen supplementation based on oxygen saturation or ABGs- Provide Smoking Cessation handout, if applicable- Encourage broncho-pulmonary hygiene including cough, deep breathe, Incentive Spirometry- Assess the need for suctioning and perform as needed- Assess and instruct to report SOB or any respiratory difficulty- Respiratory Therapy support as indicated- Manage/alleviate anxiety- Monitor for signs/symptoms of CO2 retention  Outcome: Progressing     Problem: Diabetes/Glucose Control  Goal: Glucose maintained within prescribed range  Description: INTERVENTIONS:- Monitor Blood Glucose as ordered- Assess for signs and symptoms of hyperglycemia and hypoglycemia- Administer ordered medications to maintain glucose within target range- Assess barriers to adequate nutritional intake and initiate nutrition consult as needed- Instruct patient on self management of diabetes  Outcome: Progressing

## 2025-04-23 NOTE — PROGRESS NOTES
Progress Note  Shaq Song Patient Status:  Inpatient    10/2/1940 MRN [id number]   Location [facility] 8NE-A Attending Carlos Collins*   Hosp Day # 5 PCP Jose Palencia MD     Subjective:  Continues to feel well without symptoms. Converted to NSR this morning.  BP and hgb remains stable. Plts improved.   BM's dark, but not black and solid stools similar to baseline    Objective:  Physical Exam:   /67 (BP Location: Right arm)   Pulse 74   Temp 99 °F (37.2 °C) (Oral)   Resp 16   Wt 153 lb 14.1 oz (69.8 kg)   SpO2 99%   BMI 24.10 kg/m²   Temp (24hrs), Av.2 °F (36.8 °C), Min:97.8 °F (36.6 °C), Max:99 °F (37.2 °C)       Intake/Output Summary (Last 24 hours) at 2025 0858  Last data filed at 2025  Gross per 24 hour   Intake 458 ml   Output 0 ml   Net 458 ml     Wt Readings from Last 3 Encounters:   25 153 lb 14.1 oz (69.8 kg)   25 162 lb (73.5 kg)   25 165 lb (74.8 kg)     Telemetry: NSR  General: Thin male who is alert and oriented in no apparent distress lying comfortably in bed. Mild pallor. No jaundice.  HEENT: No focal deficits.  Neck: No JVD, carotids 2+ no bruits.  Cardiac: Irregularly irregular, S1, S2 normal, rub or gallop.  Lungs: Clear without wheezes, rales, rhonchi or dullness.  Normal excursions and effort.  Abdomen: Soft, non-tender.   Extremities: Without clubbing, cyanosis or edema.  Peripheral pulses are 2+.  Neurologic: Alert and oriented, normal affect.  Skin: Warm and dry.        Intake/Output:    Intake/Output Summary (Last 24 hours) at 2025 0858  Last data filed at 2025  Gross per 24 hour   Intake 458 ml   Output 0 ml   Net 458 ml       Last 3 Weights   25 0518 153 lb 14.1 oz (69.8 kg)   25 0302 159 lb 2.8 oz (72.2 kg)   25 1014 162 lb (73.5 kg)   25 0511 165 lb (74.8 kg)       Labs:  Recent Labs   Lab 25  1803 25  0639 25  0538   * 183* 162*   BUN 17 17 17    CREATSERUM 1.04 1.04 1.14   EGFRCR 71 71 63   CA 8.6* 8.8 8.7    139 142   K 3.7 3.7  3.7 3.7  3.7    105 107   CO2 25.0 24.0 25.0     Recent Labs   Lab 04/22/25  1447 04/23/25  0153 04/23/25  0538 04/23/25  0830   RBC 3.12* 2.93*  --  3.23*   HGB 9.1* 8.7*  --  9.6*   HCT 26.9* 25.6*  --  28.0*   MCV 86.2 87.4  --  86.7   MCH 29.2 29.7  --  29.7   MCHC 33.8 34.0  --  34.3   RDW 16.0 16.4  --  16.5   NEPRELIM 3.77 3.28  --  5.75   WBC 5.9 5.4  --  9.2   .0* 118.0* 108.0* 169.0         Recent Labs   Lab 04/17/25  2155 04/18/25  0608 04/19/25  0659   TROPHS 121* 8,405* 13,626*       Diagnostics:   ECHOCARDIOGRAM 4/21:  1. Left ventricle: The cavity size was normal. Wall thickness was normal.       Systolic function was normal. The estimated ejection fraction was       50-55%, by 3D assessment. Unable to assess LV diastolic function due to heart rhythm.   2.  Left ventricle: There is hypokinesis of the basal-mid inferoseptal and       entire inferior walls.   3.  Right ventricle: Systolic function was normal.   4.  Left atrium: The left atrial volume was markedly increased.   5.  Right atrium: The atrium was moderately dilated.   6.  Aortic valve: The valve was trileaflet. The leaflets were mildly       calcified. There was mild regurgitation.   7.  Aortic root: The aortic root was 4.1cm diameter.   8.  Ascending aorta: The ascending aorta was 4.2cm diameter.   9.  Mitral valve: Prolapse, involving the posterior leaflet. There was       moderate regurgitation.   10. Pulmonary arteries: Systolic pressure was mildly increased, in the range       of 35mm Hg to 40mm Hg.   Impressions:  This study is compared with previous dated 12/19/2019: Wall   motion abnormality is new. Mitral regurgitation was noted previously.   *       Medications:  Scheduled Medications[1]  Medication Infusions[2]    Assessment/Plan:    Acute blood loss with pancreatic cancer and concern for erosion into the  stomach  Presenting hgb 5.6. maintain Hgb > 7  Heparin resumed 4/22- hgb stable thus far- black BM  yesterday  Denies recurrent bleeding  NSTEMI likely combination of demand mediated presented with above and ischemic CAD  LVEF remains preserved, hypokinesis of basal-mid inferoseptal and inferior walls  Poor interventional candidate given life threatening bleeding and per GI limited endoscopic options if bleeding reoccurs. Given lack of symptoms and significant risk of AC, he  had remained off of AC - reasonable to resume AC per GI  Likely degree of CAD given age and eventual ischemic evaluation is warranted  Hx of pAF presenting in Afib RVR  IV heparin resumed 4/21 PM  Currently ventricular rates controlled  lopressor to 100 mg BID and 60 mg QID of diltiazem  LVEF preserved   Moderate to severe mitral regurgitation, mitral valve prolapse w/ partial flail P2 scallop -intervention delayed by pancreatic cancer diagnosis  Compensated throughout hospital stay  Ascending aorta dilation, 4.4 cm  Pancreatic cancer on chemotherapy  HTN  BP at goal  HLD  DM II  Hx of remote DVT; provoked  Hx of PE in March  Negative CTA chest 4/17      PLAN  Remains chest pain free  IV heparin resumed 4/21 and Eliquis resumed 4/22 PM at 5 mg BID with stable Hgb  Metoprolol 100 mg BID and diltiazem 60 QID - adjust to XR at discharge  Remains very poor interventional candidate given acute bleeding and concern for malignancy contributing - eventual ischemic evaluation  Outpatient follow up with Dr. Acosta next week and oncology provider.  Outpatient CBCd early next week  ED precautions and monitoring for bleeding reviewed        Lucas Harris PA-C  4/23/2025  09:557AM     Patient seen and examined independently.  Note reviewed and labs reviewed. Agree with above assessment and plan.  84-year-old male with complex medical history including pancreatic cancer currently on chemotherapy who presented with acute GI blood loss findings of blood vessel  erosion in the fundus of the stomach s/p intervention.  Hemoglobin stable since then without further dark stools either.  Noted to have likely an NSTEMI during this admission for which she has been largely asymptomatic.  Echo shows LVEF 50-55% hypokinesis of the basal to mid inferoseptal and entire inferior wall.  This is in the setting of moderate mitral regurgitation likely due to posterior leaflet partial flail.  Given acute bleeding as well as current pancreatic cancer process, in-depth discussion was held with the patient and his wife who interventional evaluation of coronary anatomy has if he requires intervention, firstly, he would benefit from surgical intervention to repair as well as consider bypass.  Secondly, if PCI route was chosen, he would need to tolerate antiplatelet regimen.  At this time, he would like to focus on his pancreatic cancer treatment.  Due to follow-up with oncology as an outpatient.  Recommend CBC in 1 week.  Timely follow-up will be established with myself.  He is instructed to return to the ER if he has chest pain, lightheadedness, dizziness, syncope, dark stools.  Okay to discharge from cardiology perspective today with further follow-up as an outpatient.        Teo Acosta DO  Cardiologist  [facility] [place]  4/23/2025 2:18 PM      Note to the patient: The 21st Century Cures Act makes medical notes like these available to patients in the interest of transparency. However, be advised that this is a medical document. It is intended as peer to peer communication. It is written in medical language and may contain abbreviations or verbiage that are unfamiliar. It may appear blunt or direct. Medical documents are intended to carry relevant information, facts as evident, and clinical opinion of the practitioner.     Disclaimer: Components of this note were documented using voice recognition system and are subject to errors not corrected at proofreading. Contact the author  of this note for any clarifications.              [1]    apixaban  5 mg Oral BID    dilTIAZem  60 mg Oral 4 times per day    metoprolol tartrate  100 mg Oral 2x Daily(Beta Blocker)    pantoprazole  40 mg Oral BID AC    vancomycin  125 mg Oral 4 times per day    finasteride  5 mg Oral Daily    insulin aspart  0.2 Units/kg Subcutaneous Once    amitriptyline  10 mg Oral QPM    sodium chloride   Intravenous Once    insulin aspart  2-10 Units Subcutaneous TID AC and HS   [2]

## 2025-04-23 NOTE — DISCHARGE INSTRUCTIONS
Metformin should not be taken within 48 hours of receiving IV contrast dye. Do not resume your Metformin until tomorrow, 4/24/2025

## 2025-04-23 NOTE — PROGRESS NOTES
NURSING DISCHARGE NOTE    Discharged Home via Wheelchair.  Accompanied by Support staff  Belongings Taken by patient/family.        Pt cleared by all services to discharge. Discharge instructions given to and understood by pt/spouse. Pt given CD record of CT abdomen- attention pancreas. Appointments reviewed. Pt left unit, taken to [facility] by [facility] for discharge.

## 2025-04-23 NOTE — DISCHARGE SUMMARY
[facility] Hospitalist Discharge Summary     Patient ID:  Shaq Song  84 year old  10/2/1940    Admit date: 4/17/2025    Discharge date and time: 04/23/25      Attending Physician: Carlos Collins*     Primary Care Physician: Jose Palencia MD     Discharge Diagnoses: Atrial fibrillation with rapid ventricular response (HCC) [I48.91]  Malignant neoplasm of pancreas, unspecified location of malignancy (HCC) [C25.9]  Diarrhea, unspecified type [R19.7]    Please note that only IHP DMG and EMG patients enrolled in the Medicare ACO, BCBS ACO and [facility] HMOs will be handled by the [facility] Care Management team.  For all other patients, please follow usual protocol for discharge care transition.    Discharge Condition: stable    Disposition:  home/c    Important Follow up:  - PCP within 2 weeks       Follow-up Information       Jose Palencia MD. Schedule an appointment as soon as possible for a visit in 1 week(s).    Specialty: UROLOGY  Contact information:  [address]  [facility] [address]  [phone]               Teo Acosta DO. Schedule an appointment as soon as possible for a visit in 2 week(s).    Specialty: Cardiovascular Diseases  Contact information:  [address]  [address]  [facility] [address]  [phone]               Lab - CBC Follow up.    Why: next week                               Hospital Course:        84 year old male with PMH sig for pancreatic ca on chemotx (follows with [facility]), PAF on eliquis, hx multiple subsegmental PE, HTN, DM2, GERD p/w weakness and fatigue. Reports last round of chemotherapy was earlier this week and since then he's been feeling weak and tried. Also reporting some diarrhea. Started reporting some chest heaviness last night after dinner. Worsens on deep inspiration. He received iron infusion after chemotherapy. Also reporting black stools.  In the ED HR in 150s (afib/rvr), other VS. Labs with  low K and hgb of 5.6, trop 121. UA with wbc's, LE, +glucose but no bacteria. CTA chest with no PE but with small b/l pleural effusions and ascending aorta ectasia 4.4cm. GI and cardiology consulted. Given 1u PRBC and protonix. Eliquis held, not given Kcentra. Did not respond to IV cardizem/metoprolol and then given digoxin IV. Admitted for further evaluation and treatment.      Acute on chronic Anemia with black stools  Acute posthemorrhagic blood loss 2/2 Pancreatic tumor eroding into the stomach (EGD 4/18)  - last Hgb in system on 3/6 was 10.2  - [facility] - pending  - s/p 3u PRBCs so far  - hold PTA eliquis  - goal Hgb>8 given NSTEMI  - GI following, s/p endoscopy 4/18 with pancreatic tumor eroding into the stomach s/p epi/APC/x2 hemoclips/hemospray  - cleared to resume AC per GI, pt would like to d/w his oncologist at [facility] prior to resuming  - PPI BID   - d/w Cardiology  - CTA Abd/pelvis reviewed, pancreatic mass at the tail which could be infiltrating into stomach  - cardiology d/w his primary oncologist at [facility], plan for eliquis resumption tonight with close follow up with oncology   - tolerated heparin/eliquis challenge, cont on dc - OP cardiology and Onc follow up      Paroxysmal atrial fibrillation with RVR  - triggered by anemia, hypovolemia  - diltiazem infusion, s/p IV digoxin in ED  - resume PTA metoprolol BID, increase to 100 BID along with dilt 60 qid, XR on dc   - cardiology following  - telemetry monitoring      Elevated troponin  - demand ischemia in setting of anemia, RVR  - should improve with treatment  - defer ischemic evaluation to cardiology, can he tolerate antiplatelet therapy post-LHC?     hx BPH, Urinary retention, follows [facility], had bender removed on 3/18  - low suspicion for UTI  - given C.diff + and no symptoms of UTI will dc CTX  - finasteride      C.diff diarrhea  - +EIA, PCR 4/18  - started po vanco qid 4/19, will  need 10d course - cont on dc to complete course     Pancreatic cancer  - follows [facility]  - chart not accessible for complete information, will request consent and submit after, d/w RN  - last chemo 3 days ago  - will need to f/u as OP      Mod-sev Mitral Regurg/MVP  - monitor   - cardiology following  - OP structural evaluation     Hx PE  - no PE on CTA chest 4/17 in ED     Essential HTN  - metoprolol  - dc PTA valsartan     DM2  - ISS/accucheks     GERD  - ppi       Consults: IP CONSULT TO HOSPITALIST  IP CONSULT TO GASTROENTEROLOGY  IP CONSULT TO CARDIOLOGY  IP CONSULT TO SPIRITUAL CARE  IP CONSULT TO SOCIAL WORK    Operative Procedures: Procedure(s) (LRB):  ESOPHAGOGASTRODUODENOSCOPY (EGD) WITH ARGON PLASMA COAGULATION AND CLIP PLACEMNT X 3 AND SUBMUCOSAL EPINEPHRINE INJECTION AND HEMOSPRAY (N/A)       Patient instructions:      I as the attending physician reconciled the current and discharge medications on day of discharge.     Current Discharge Medication List        START taking these medications    Details   dilTIAZem  MG Oral Capsule SR 24 Hr Take 1 capsule (120 mg total) by mouth daily.      pantoprazole 40 MG Oral Tab EC Take 1 tablet (40 mg total) by mouth 2 (two) times daily before meals.      vancomycin 125 MG Oral Cap Take 1 capsule (125 mg total) by mouth every 6 (six) hours for 9 days.           CONTINUE these medications which have CHANGED    Details   metoprolol tartrate 100 MG Oral Tab Take 1 tablet (100 mg total) by mouth 2x Daily(Beta Blocker).           CONTINUE these medications which have NOT CHANGED    Details   ELIQUIS 5 MG Oral Tab Take 1 tablet (5 mg total) by mouth 2 (two) times daily.      ondansetron (ZOFRAN) 8 MG tablet Take 1 tablet (8 mg total) by mouth every 8 (eight) hours as needed for Nausea.      finasteride 5 MG Oral Tab Take 1 tablet (5 mg total) by mouth in the morning.      metFORMIN 500 MG Oral Tab Take 2 tablets (1,000 mg total) by mouth daily with  breakfast.      amitriptyline 10 MG Oral Tab Take 1 tablet (10 mg total) by mouth every evening.      Cholecalciferol (VITAMIN D3 OR) Take 1 tablet by mouth in the morning.      MULTIVITAMINS OR TABS Take 1 tablet by mouth in the morning.           STOP taking these medications       aspirin 81 MG Oral Tab EC        valsartan 320 MG Oral Tab              Activity: activity as tolerated  Diet: regular diet  Wound Care: as directed  Code Status: Prior      Discharge Exam:     General: no acute distress, alert and oriented x 3  Heart: RRR  Lungs: clear bilaterally, no active wheezing  Abdomen: nontender, nondistended, intact BS  Extremities: no pedal edema   Neuro: CN inact, no focal deficits      Total time coordinating care for discharge: Greater than 30 minutes    Chuck Collins MD  [facility]

## 2025-04-23 NOTE — PLAN OF CARE
Assumed care of pt around 0730  Isolation for cdiff  AxO x4, R/A, up SBA  A fib on tele  Pt denies any pain  Plan: HR control, Po vanco, Eliquis BID, discharge today  Continent of bowel and bladder  Fall precautions, pt updated on plan of care          Problem: Risk for Violence/Aggression  Goal: Absence of Violence/Aggression  Description: INTERVENTIONS: - Identify precipitating factors for behavior - Notify Charge RN/Provider - Consider decreasing stimulation - Consider distraction measures - Consider discussion with provider regarding prn meds  - Consider JOCELYN (Moderate Risk only) - Consider Code Support (High Risk only) - Consider room safety checks - Consider restraints  Outcome: Progressing     Problem: CARDIOVASCULAR - ADULT  Goal: Maintains optimal cardiac output and hemodynamic stability  Description: INTERVENTIONS:- Monitor vital signs, rhythm, and trends- Monitor for bleeding, hypotension and signs of decreased cardiac output- Evaluate effectiveness of vasoactive medications to optimize hemodynamic stability- Monitor arterial and/or venous puncture sites for bleeding and/or hematoma- Assess quality of pulses, skin color and temperature- Assess for signs of decreased coronary artery perfusion - ex. Angina- Evaluate fluid balance, assess for edema, trend weights  Outcome: Progressing  Goal: Absence of cardiac arrhythmias or at baseline  Description: INTERVENTIONS:- Continuous cardiac monitoring, monitor vital signs, obtain 12 lead EKG if indicated- Evaluate effectiveness of antiarrhythmic and heart rate control medications as ordered- Initiate emergency measures for life threatening arrhythmias- Monitor electrolytes and administer replacement therapy as ordered  Outcome: Progressing     Problem: PAIN - ADULT  Goal: Verbalizes/displays adequate comfort level or patient's stated pain goal  Description: INTERVENTIONS:- Encourage pt to monitor pain and request assistance- Assess pain using appropriate pain  scale- Administer analgesics based on type and severity of pain and evaluate response- Implement non-pharmacological measures as appropriate and evaluate response- Consider cultural and social influences on pain and pain management- Manage/alleviate anxiety- Utilize distraction and/or relaxation techniques- Monitor for opioid side effects- Notify MD/LIP if interventions unsuccessful or patient reports new pain- Anticipate increased pain with activity and pre-medicate as appropriate  Outcome: Progressing     Problem: RESPIRATORY - ADULT  Goal: Achieves optimal ventilation and oxygenation  Description: INTERVENTIONS:- Assess for changes in respiratory status- Assess for changes in mentation and behavior- Position to facilitate oxygenation and minimize respiratory effort- Oxygen supplementation based on oxygen saturation or ABGs- Provide Smoking Cessation handout, if applicable- Encourage broncho-pulmonary hygiene including cough, deep breathe, Incentive Spirometry- Assess the need for suctioning and perform as needed- Assess and instruct to report SOB or any respiratory difficulty- Respiratory Therapy support as indicated- Manage/alleviate anxiety- Monitor for signs/symptoms of CO2 retention  Outcome: Progressing     Problem: Diabetes/Glucose Control  Goal: Glucose maintained within prescribed range  Description: INTERVENTIONS:- Monitor Blood Glucose as ordered- Assess for signs and symptoms of hyperglycemia and hypoglycemia- Administer ordered medications to maintain glucose within target range- Assess barriers to adequate nutritional intake and initiate nutrition consult as needed- Instruct patient on self management of diabetes  Outcome: Progressing

## 2025-04-25 NOTE — PROGRESS NOTES
Provider Clarification   Additional information about the patient's documented myocardial infarction.  Type 2 MI due to demand ischemia   This note is part of the patient's medical record.

## 2025-05-19 ENCOUNTER — APPOINTMENT (OUTPATIENT)
Dept: GENERAL RADIOLOGY | Facility: HOSPITAL | Age: 85
DRG: 378 | End: 2025-05-19
Attending: EMERGENCY MEDICINE
Payer: MEDICARE

## 2025-05-19 ENCOUNTER — HOSPITAL ENCOUNTER (INPATIENT)
Facility: HOSPITAL | Age: 85
LOS: 1 days | Discharge: HOME | End: 2025-05-20
Attending: EMERGENCY MEDICINE | Admitting: HOSPITALIST
Payer: MEDICARE

## 2025-05-19 ENCOUNTER — HOSPITAL ENCOUNTER (INPATIENT)
Facility: HOSPITAL | Age: 85
LOS: 1 days | Discharge: HOME | DRG: 378 | End: 2025-05-20
Attending: EMERGENCY MEDICINE | Admitting: HOSPITALIST
Payer: MEDICARE

## 2025-05-19 ENCOUNTER — APPOINTMENT (OUTPATIENT)
Dept: GENERAL RADIOLOGY | Facility: HOSPITAL | Age: 85
End: 2025-05-19
Attending: EMERGENCY MEDICINE
Payer: MEDICARE

## 2025-05-19 DIAGNOSIS — D64.9 ANEMIA, UNSPECIFIED TYPE: ICD-10-CM

## 2025-05-19 DIAGNOSIS — K92.2 GASTROINTESTINAL HEMORRHAGE, UNSPECIFIED GASTROINTESTINAL HEMORRHAGE TYPE: Primary | ICD-10-CM

## 2025-05-19 DIAGNOSIS — C25.9 MALIGNANT NEOPLASM OF PANCREAS, UNSPECIFIED LOCATION OF MALIGNANCY (HCC): ICD-10-CM

## 2025-05-19 LAB
ALBUMIN SERPL-MCNC: 3.9 G/DL (ref 3.2–4.8)
ALBUMIN/GLOB SERPL: 1.8 {RATIO} (ref 1–2)
ALP LIVER SERPL-CCNC: 47 U/L (ref 45–117)
ALT SERPL-CCNC: 10 U/L (ref 10–49)
ANION GAP SERPL CALC-SCNC: 9 MMOL/L (ref 0–18)
ANTIBODY SCREEN: NEGATIVE
AST SERPL-CCNC: 15 U/L (ref ?–34)
ATRIAL RATE: 66 BPM
BASOPHILS # BLD AUTO: 0.03 X10(3) UL (ref 0–0.2)
BASOPHILS NFR BLD AUTO: 0.8 %
BILIRUB SERPL-MCNC: 0.6 MG/DL (ref 0.2–1.1)
BUN BLD-MCNC: 16 MG/DL (ref 9–23)
CALCIUM BLD-MCNC: 8.8 MG/DL (ref 8.7–10.6)
CHLORIDE SERPL-SCNC: 106 MMOL/L (ref 98–112)
CHOLEST SERPL-MCNC: 90 MG/DL (ref ?–200)
CO2 SERPL-SCNC: 24 MMOL/L (ref 21–32)
CREAT BLD-MCNC: 1.16 MG/DL (ref 0.7–1.3)
EGFRCR SERPLBLD CKD-EPI 2021: 62 ML/MIN/1.73M2 (ref 60–?)
EOSINOPHIL # BLD AUTO: 0.07 X10(3) UL (ref 0–0.7)
EOSINOPHIL NFR BLD AUTO: 2 %
ERYTHROCYTE [DISTWIDTH] IN BLOOD BY AUTOMATED COUNT: 16.8 %
GLOBULIN PLAS-MCNC: 2.2 G/DL (ref 2–3.5)
GLUCOSE BLD-MCNC: 193 MG/DL (ref 70–99)
GLUCOSE BLD-MCNC: 293 MG/DL (ref 70–99)
HCT VFR BLD AUTO: 21.6 % (ref 39–53)
HDLC SERPL-MCNC: 38 MG/DL (ref 40–59)
HGB BLD-MCNC: 7.3 G/DL (ref 13–17.5)
IMM GRANULOCYTES # BLD AUTO: 0.02 X10(3) UL (ref 0–1)
IMM GRANULOCYTES NFR BLD: 0.6 %
LDLC SERPL CALC-MCNC: 28 MG/DL (ref ?–100)
LYMPHOCYTES # BLD AUTO: 0.85 X10(3) UL (ref 1–4)
LYMPHOCYTES NFR BLD AUTO: 24.1 %
MCH RBC QN AUTO: 30.2 PG (ref 26–34)
MCHC RBC AUTO-ENTMCNC: 33.8 G/DL (ref 31–37)
MCV RBC AUTO: 89.3 FL (ref 80–100)
MONOCYTES # BLD AUTO: 0.44 X10(3) UL (ref 0.1–1)
MONOCYTES NFR BLD AUTO: 12.5 %
NEUTROPHILS # BLD AUTO: 2.12 X10 (3) UL (ref 1.5–7.7)
NEUTROPHILS # BLD AUTO: 2.12 X10(3) UL (ref 1.5–7.7)
NEUTROPHILS NFR BLD AUTO: 60 %
NONHDLC SERPL-MCNC: 52 MG/DL (ref ?–130)
OSMOLALITY SERPL CALC.SUM OF ELEC: 300 MOSM/KG (ref 275–295)
P AXIS: 78 DEGREES
P-R INTERVAL: 154 MS
PLATELET # BLD AUTO: 100 10(3)UL (ref 150–450)
PLATELETS.RETICULATED NFR BLD AUTO: 3.4 % (ref 0–7)
POTASSIUM SERPL-SCNC: 3.9 MMOL/L (ref 3.5–5.1)
PROT SERPL-MCNC: 6.1 G/DL (ref 5.7–8.2)
Q-T INTERVAL: 420 MS
QRS DURATION: 92 MS
QTC CALCULATION (BEZET): 440 MS
R AXIS: 27 DEGREES
RBC # BLD AUTO: 2.42 X10(6)UL (ref 3.8–5.8)
RH BLOOD TYPE: POSITIVE
SODIUM SERPL-SCNC: 139 MMOL/L (ref 136–145)
T AXIS: 18 DEGREES
TRIGL SERPL-MCNC: 140 MG/DL (ref 30–149)
TROPONIN I SERPL HS-MCNC: 106 NG/L (ref ?–53)
VENTRICULAR RATE: 66 BPM
VLDLC SERPL CALC-MCNC: 19 MG/DL (ref 0–30)
WBC # BLD AUTO: 3.5 X10(3) UL (ref 4–11)

## 2025-05-19 PROCEDURE — 86850 RBC ANTIBODY SCREEN: CPT | Performed by: EMERGENCY MEDICINE

## 2025-05-19 PROCEDURE — 85025 COMPLETE CBC W/AUTO DIFF WBC: CPT | Performed by: EMERGENCY MEDICINE

## 2025-05-19 PROCEDURE — 80053 COMPREHEN METABOLIC PANEL: CPT | Performed by: EMERGENCY MEDICINE

## 2025-05-19 PROCEDURE — 80061 LIPID PANEL: CPT | Performed by: EMERGENCY MEDICINE

## 2025-05-19 PROCEDURE — 93005 ELECTROCARDIOGRAM TRACING: CPT

## 2025-05-19 PROCEDURE — 71045 X-RAY EXAM CHEST 1 VIEW: CPT | Performed by: EMERGENCY MEDICINE

## 2025-05-19 PROCEDURE — 86901 BLOOD TYPING SEROLOGIC RH(D): CPT | Performed by: EMERGENCY MEDICINE

## 2025-05-19 PROCEDURE — 82962 GLUCOSE BLOOD TEST: CPT

## 2025-05-19 PROCEDURE — 93010 ELECTROCARDIOGRAM REPORT: CPT

## 2025-05-19 PROCEDURE — 86920 COMPATIBILITY TEST SPIN: CPT

## 2025-05-19 PROCEDURE — 99285 EMERGENCY DEPT VISIT HI MDM: CPT

## 2025-05-19 PROCEDURE — 30233N1 TRANSFUSION OF NONAUTOLOGOUS RED BLOOD CELLS INTO PERIPHERAL VEIN, PERCUTANEOUS APPROACH: ICD-10-PCS | Performed by: HOSPITALIST

## 2025-05-19 PROCEDURE — 96365 THER/PROPH/DIAG IV INF INIT: CPT

## 2025-05-19 PROCEDURE — 36430 TRANSFUSION BLD/BLD COMPNT: CPT

## 2025-05-19 PROCEDURE — 84484 ASSAY OF TROPONIN QUANT: CPT | Performed by: EMERGENCY MEDICINE

## 2025-05-19 PROCEDURE — 86900 BLOOD TYPING SEROLOGIC ABO: CPT | Performed by: EMERGENCY MEDICINE

## 2025-05-19 RX ORDER — SODIUM CHLORIDE 9 MG/ML
INJECTION, SOLUTION INTRAVENOUS CONTINUOUS
Status: DISCONTINUED | OUTPATIENT
Start: 2025-05-19 | End: 2025-05-20

## 2025-05-19 RX ORDER — METOPROLOL TARTRATE 100 MG/1
100 TABLET ORAL
Status: DISCONTINUED | OUTPATIENT
Start: 2025-05-19 | End: 2025-05-20

## 2025-05-19 RX ORDER — NICOTINE POLACRILEX 4 MG
15 LOZENGE BUCCAL
Status: DISCONTINUED | OUTPATIENT
Start: 2025-05-19 | End: 2025-05-20

## 2025-05-19 RX ORDER — AMITRIPTYLINE HYDROCHLORIDE 10 MG/1
10 TABLET ORAL EVERY EVENING
Status: DISCONTINUED | OUTPATIENT
Start: 2025-05-19 | End: 2025-05-20

## 2025-05-19 RX ORDER — METOCLOPRAMIDE HYDROCHLORIDE 5 MG/ML
5 INJECTION INTRAMUSCULAR; INTRAVENOUS EVERY 8 HOURS PRN
Status: DISCONTINUED | OUTPATIENT
Start: 2025-05-19 | End: 2025-05-20

## 2025-05-19 RX ORDER — FINASTERIDE 5 MG/1
5 TABLET, FILM COATED ORAL DAILY
Status: DISCONTINUED | OUTPATIENT
Start: 2025-05-20 | End: 2025-05-20

## 2025-05-19 RX ORDER — DEXTROSE MONOHYDRATE 25 G/50ML
50 INJECTION, SOLUTION INTRAVENOUS
Status: DISCONTINUED | OUTPATIENT
Start: 2025-05-19 | End: 2025-05-20

## 2025-05-19 RX ORDER — ACETAMINOPHEN 500 MG
500 TABLET ORAL EVERY 4 HOURS PRN
Status: DISCONTINUED | OUTPATIENT
Start: 2025-05-19 | End: 2025-05-20

## 2025-05-19 RX ORDER — NICOTINE POLACRILEX 4 MG
30 LOZENGE BUCCAL
Status: DISCONTINUED | OUTPATIENT
Start: 2025-05-19 | End: 2025-05-20

## 2025-05-19 RX ORDER — DILTIAZEM HYDROCHLORIDE 120 MG/1
120 CAPSULE, EXTENDED RELEASE ORAL DAILY
Status: DISCONTINUED | OUTPATIENT
Start: 2025-05-20 | End: 2025-05-20

## 2025-05-19 RX ORDER — ONDANSETRON 2 MG/ML
4 INJECTION INTRAMUSCULAR; INTRAVENOUS EVERY 6 HOURS PRN
Status: DISCONTINUED | OUTPATIENT
Start: 2025-05-19 | End: 2025-05-20

## 2025-05-19 NOTE — PROGRESS NOTES
Pt sitting in bed watching TV. Blood products being transfused at this time. Pt has no requests at this time.

## 2025-05-19 NOTE — CONSULTS
Cardiology Consultation    Shaq Song Patient Status:  Emergency    10/2/1940 MRN [id number]   formerly [facility] EMERGENCY DEPARTMENT Attending Anton Chandler MD   Hosp Day # 0 PCP Alis Delgado MD     Reason for Consultation:  PAF, GIB      History of Present Illness:  Shaq Song is a a(n) 84 year old male. He was admitted one month ago with UGIB and afib with RVR.  Prior h/o PAF, pancreatic CA, moderate+MR, HTN, HL, remote DVT, and DM.  He was found on EGD to have a bleeding lesion likely from erosion of his cancer into his stomach.  This was treated endoscopically.  He was stable on IV heparin, transitioned to eliquis, home on .  Also had an NSTEMI, troponin peaked at 13,600.  EF low normal.  No cath performed in that clinical situation.  Unfortunately now back in the ER with melena.  HB down to 7.3 from 9.2.  no cp or dyspnea.    History:  Past Medical History[1]  Past Surgical History[2]  Family History[3]      Allergies:  Allergies[4]    Medications:  Scheduled Medications[5]    Continuous Infusions:  Medication Infusions[6]    Social History:   reports that he quit smoking about 67 years ago. His smoking use included cigarettes. He has never used smokeless tobacco. He reports that he does not currently use alcohol. He reports that he does not use drugs.    Review of Systems:  All systems were reviewed and are negative except as described above in HPI.    Physical Exam:      Temp:  [97 °F (36.1 °C)] 97 °F (36.1 °C)  Pulse:  [65-75] 74  Resp:  [14-20] 18  BP: (148-187)/(65-90) 187/82  SpO2:  [100 %] 100 %    Last 3 Weights   25 1416 150 lb (68 kg)   25 0518 153 lb 14.1 oz (69.8 kg)   25 0302 159 lb 2.8 oz (72.2 kg)   25 1014 162 lb (73.5 kg)           General: No apparent distress  HEENT: No focal deficits.  Neck: supple. JVP normal  Cardiac: Irregular rhythm, S1, S2 normal,   No rub or gallop.  No murmur.  Lungs: CTA  Abdomen: Soft, non-tender.    Extremities: No edema  Neurologic: no focal deficits  Skin: Warm and dry.          Telemetry: fib    Laboratories and Data:  Diagnostics:    EKG, 5/19/2025:  NSR    CXR, 5/19/2025:      Labs:   HEM:  Recent Labs   Lab 05/19/25  1435   WBC 3.5*   HGB 7.3*   .0*       Chem:  Recent Labs   Lab 05/19/25  1435      K 3.9      CO2 24.0   BUN 16   CREATSERUM 1.16   CA 8.8   *       Recent Labs   Lab 05/19/25  1435   ALT 10   AST 15   ALB 3.9       No results for input(s): \"PTP\", \"INR\" in the last 168 hours.    No results for input(s): \"TROP\", \"CK\" in the last 168 hours.      Impression:   UGIB, recurrent, on eliquis.  Eroding pancreatic cancer into the stomach suspect.  Pancreatic CA  NSTEMI April 2024, treating medically.  EF 50%.  PAF - NSR at present.      Plan:  Long talk with him.  Eliquis to be stopped in this setting.  Likely will not resume, but certainly would not consider for one to two weeks minimum.  Risk of stroke explained in detail, but clearly bleeding risk outweighs stroke risk in this setting.    Boy Garcia MD  5/19/2025  4:58 PM  C5         [1]   Past Medical History:   Actinic keratoses    Arrhythmia    AFib    Bladder stones    Colon polyp    DEPRESSION    Depression    Diabetes (HCC)    Enlarged prostate with urinary retention    Esophageal reflux    Morocho catheter in place    Gastritis    Gastro-esophageal reflux disease with esophagitis    Glucose intolerance    Hearing impaired person, bilateral    Hiatal hernia    High blood pressure    HYPERTRIGLYCERIDEMIA    Incontinence    Has a urinary cathether, can be incontinent of stool with urinary urgency    LAE (left atrial enlargement)    LAE (left atrial enlargement)    Left ventricular diastolic dysfunction with preserved systolic function    LVH (left ventricular hypertrophy)    Mild ascending aorta dilation    Osteoarthrosis, unspecified whether generalized or localized, unspecified site    Other and unspecified  hyperlipidemia    Pancreas cancer (HCC)    Pancreas cyst (HCC)    bx    Pulmonary embolism (HCC)    Many years ago    RENAL DISEASE    RLS (restless legs syndrome)    Synovial cyst of lumbar facet joint    Unspecified essential hypertension    Vasovagal syncope    Visual impairment    glasses   [2]   Past Surgical History:  Procedure Laterality Date    Colonoscopy  06/22/2010    POLYP/5YRS, Dr Garcia    Egd and esd - internal  12/11/2017    Esophageal bx benign, pancreatic cyst aspirartion.  Dr Holland    Remove bladder stone,>2.5cm  02/20/2025    with TURP    Tonsillectomy      Transurethral elec-surg prostatectom  02/20/2025    with bladder stones laser litho    Upper gi endoscopy - referral  06/22/2010    Upper gi endoscopy,exam  02/10/2025   [3]   Family History  Problem Relation Age of Onset    Cancer Father         esophageal    Cancer Mother         breast   [4]   Allergies  Allergen Reactions    Pcn [Penicillins]      \" FAINTED\"   [5] [6]

## 2025-05-19 NOTE — HISTORICAL OFFICE NOTE
Facility Logo [facility]  [address] [place] [address], [address] [place] [address]  [phone]      Shaq Song  Progress Note  Demographics:  Name: Shaq Song YOB: 1940  Age: 84, Male Medical Record No: [id number]  Visited Date/Time: 05/13/2025 10:10 AM    Chief Complaints  1mo f/u  PET, CBC done; started lipitor    History of Present Illness  84-year-old male with chronic medical conditions including hypertension, hyperlipidemia, DM2, Hx mitral valve prolapse with moderate to severe regurgitation, remote Hx DVT.  Patient presents after noting that he has been having palpitations, lightheadedness, near syncope since Wednesday last week.  Patient notes that he has had intermittent episodes and felt the worst on Friday.  He noticed that his heart rate was elevated and he felt like his heart was pumping rapidly and irregularly and believed he was also quite lightheaded during the time.  Patient did not follow-up with structural valve clinic previously after he had a VANESSA in 2020.     Since prior visit, patient has unfortunately developed bleeding from his urethra and was evaluated in the emergency room.  Morocho catheter was placed with hematuria noted.  Urology evaluation is pending for Wednesday.  From a cardiology standpoint, if need be, Eliquis can be held 2 to 3 days prior to his potential procedure.  Overall, he denies having shortness of breath, chest pain, palpitations, syncope, nausea, emesis, diaphoresis.  Tolerating his medications otherwise.  He is continued his Eliquis at this time without worsening in his bleeding.    3/4/2025-patient states he is overall doing well.  Unfortunately, as he was having a cystoscopy procedure previously he was noted to also have a pancreatic mass.  He underwent biopsy since then which was notable for adenocarcinoma.  He is following with [facility] and planning for a port placement soon with chemo starting.  He is also due to have more imaging  completing including MRI of the pancreas and CT imaging with further evaluation by tumor board.  Since his cystoscopy, patient has continued to notice occasional episodes of hematuria.  He still has a Morocho catheter in place.  Therefore, he has not resumed his anticoagulation.  Denies having chest pain, shortness of breath, palpitations, nausea, emesis, syncope.    5/5/2025-patient was hospitalized for acute anemia with course C/B suspected NSTEMI and atrial fibrillation.  Endoscopy was completed which was concerning for blood vessel erosion into the stomach.  He underwent therapeutic intervention endoscopically.  Thereafter, he was started on Eliquis anticoagulation after his hemoglobin stabilized.  No left heart catheterization was performed during admission due to significant anemia as well as concern regarding future bleeding risk.  Patient also preferred following up with his oncologist prior to deciding aggressiveness of evaluation/intervention in the future.  Since discharge, patient states he has been doing very well.  He denies having lightheadedness, dizziness, chest pain, palpitations.  He does complain of lower extremity weakness at times.  Otherwise he is compliant with his Eliquis regimen and has not noted further dark stools.    5/13/2025-patient states he is overall doing okay.  He is accompanied by his wife today.  She does note that he tends to get chest pressure when he exerts himself.  He denies that it is worsening and appears pretty stable.  At this time, he is aware that his stress test was abnormal.  He would benefit from coronary evaluation but he would currently like to hold off and discuss with his oncologist and monitor his symptoms before proceeding.  Cardiac risk factors Former smoker  Past Medical History  1.Atherosclerosis of abdominal aorta  2.Controlled type 2 diabetes mellitus without complication, without long-term current use of insulin  3.Mitral valve prolapse  4.Non-rheumatic  mitral regurgitation  5.Thoracic ascending aortic aneurysm  6.Benign prostatic hyperplasia with lower urinary tract symptoms  7.Aortic valve sclerosis  8.History of pulmonary embolism  9.Essential hypertension with goal blood pressure less than 130/80  10.Pure hypercholesterolemia  11.LAE (left atrial enlargement)  12.Left ventricular diastolic dysfunction with preserved systolic function  13.Heart murmur  14.Glucose intolerance (impaired glucose tolerance)  15.Pure hyperglyceridemia  Family History  1. Father - CHF (congestive heart failure), chronic, combined systolic CHF (congestive heart failure), chronic, combined systolic; Hypertension (HTN), primary  2. Mother - Hypertension (HTN), primary  Denies premature CAD Hx, sudden cardiac death.  Social History  Smoking status Former smoker  Tobacco usage - No (Ex-smoker (finding))  Denies tobacco use, illicit drug use, EtOH use.  Review of systems  Cardiovascular Chest pain  No history of JONES, Palpitations, Syncope, PND, Orthopnea, Edema and Claudication  Respiratory No history of SOB  Physical Examination  Vitals Right Arm Sitting  / 80 mmHg, Pulse rate 96 bpm, Height in 5' 7\", BMI: 24.3, Weight in 155 lbs (or) 70.31 kgs and BSA : 1.83 cc/m²  General Appearance No Acute Distress  Cardiovascular   EKG/Other abnormalities  General: Alert and oriented x 3. No apparent distress. No respiratory or constitutional distress.  HEENT: Normocephalic, anicteric sclera, neck supple.  Neck: No JVD  Cardiac: Irregularly irregular.  Grade 2 systolic murmur.  Lungs: Clear without wheezes, rales, rhonchi or dullness.  Normal excursions and effort.  Abdomen: Soft, non-tender.   Extremities: Without clubbing, cyanosis or edema.    Neurologic: Alert, normal affect.  Skin: Warm and dry.  Allergies  No medication allergies noted.  Medications (Info obtained by: Verbal)  1.aspirin 81 MG Oral Tab, Take 81 mg by mouth daily.  2.atorvastatin 40 mg tablet, Take 1 tablet orally once a  day.  3.dilTIAZem 120 mg tablet, Take 1 tablet orally once a day.  4.Eliquis 5 mg tablet, Take 1 tablet orally 2 times a day.  5.finasteride 5 mg tablet, Take 1 tablet orally once a day.  6.metFORMIN 500 mg tablet, Take 1 tablet orally 2 times a day.  7.metoprolol tartrate 100 mg tablet, Take 1 tablet orally 2 times a day.  8.MULTIVITAMINS OR TABS, Take 1 tablet by mouth daily.  9.pantoprazole 40 mg tablet,delayed release, Take 1 tablet orally once a day.  10.VITAMIN D3 1000 UNIT OR CAPS, Take 1 tablet by mouth daily.  Impression  1.NSTEMI - non-ST elevated myocardial infarction, acute, non-Q wave or nontransmural  2.Atrial fibrillation, new onset  3.Controlled type 2 diabetes mellitus without complication, without long-term current use of insulin  4.Mitral valve prolapse  5.Non-rheumatic mitral regurgitation  6.Thoracic ascending aortic aneurysm  7.Essential hypertension with goal blood pressure less than 130/80  8.Pure hypercholesterolemia  9.Heart murmur  Assessment & Plan  Assessment  -Suspected NSTEMI during hospitalization 4/2025  - Abnormal nuclear stress perfusion evaluation  -Acute blood loss anemia 2/2 GI bleed with endoscopic findings suggestive of blood vessel erosion into the stomach  - Moderate to Severe mitral regurgitation, mitral valve prolapse with partial flail of P2 scallop ST  - Mild to moderate aortic insufficiency  - Ascending aorta dilation, 4.5 cm on TTE 2/2025  - Paroxysmal atrial fibrillation   - Hematuria-improved  - Hypertension  - Hyperlipidemia  - DM 2  - Remote Hx provoked DVT  - Pancreatic adenocarcinoma    Plan  - Currently tolerating Eliquis 5 mg twice daily.  Hemoglobin stable.  - Abnormal PET Faith nuclear MPI suggestive of reversible perfusion defects.  This correlates with his complaints of chest pressure when exerting himself.  These findings appear stable for now.  Ideally he would benefit from coronary evaluation with potential PCI.  Patient however would like to hold off  until discussing with his oncologist and monitoring his symptoms.  I personally have recommended definitive evaluation via left heart cath as his findings on perfusion imaging are reversible.  I discussed that if he is to develop worsening chest pressure on exertion or worsening symptoms, I recommend ER evaluation.  - Continue atorvastatin 40 mg daily.  CMP, lipid panel in 2 months.  - Continue metoprolol tartrate 100 mg twice daily  - MCT 7 days completed and notable for 44% atrial fibrillation burden  - Follow-up 3-4 weeks  Labs and Diagnostics ordered  1.CMP (2 Months)  2.Lipid panel, Fasting (2 Months)  Future appointments  1.Referral Visit - Alis Delgado ([email or web address]) : (Today)  2.Follow up visit - Teo Acosta DO (3 Weeks)  Miscellaneous  1.Weight monitoring (regime/therapy)  2.Reviewed glucose, sodium, potassium, chloride, co2, anion gap, bun, creatinine, calcium, osmolality calculated, e gfr cr, fasting patient bmp answer, wbc, red blood count, hgb, hct, platelets, immature platelet fraction, mean cell volume, mean corpuscular hemoglobin, mean corpuscular hgb conc, red cell distribution width cv, neutrophil abs prelim, neutrophil absolute, lymphocyte absolute, monocyte absolute, eosinophil absolute, basophil absolute, immature granulocyte count, neutrophil %, lymphocyte %, monocyte %, eosinophil %, basophil %, immature granulocyte ratio %, glucose, sodium, potassium, chloride, co2, anion gap, bun, creatinine, calcium, osmolality calculated, e gfr cr, ast, alt, alkaline phosphatase, bilirubin, total, total protein, albumin, globulin, a/g ratio and fasting patient cmp answer with the patient.  3.Reviewed trans thoracic echocardiogram, ambulatory telemetry monitor with the patient.  Nurses documentation  Upcoming surgeries/procedures: None  Use of assistive devices: None  Verbal order for EKG: No  Refills: None  Triage and medication list reviewed by: RAKEL ERYES   Patient  instructions  Medications:  1. Continue all medications.    Testin. Complete fasting blood work in 2 months.    Provider Follow Up:  1. Follow up with Dr. Acosta in 3-4 weeks.    Please bring in your medication bottles or updated medication list to your next appointment.   Call [facility] if you have any questions or concerns at [phone]   Lab Details  BASIC METABOLIC PANEL (8)  2025 01:12:36 PM  GLUCOSE 196 70-99 mg/dL H F  SODIUM 139 136-145 mmol/L  F  POTASSIUM 4.4 3.5-5.1 mmol/L  F  CHLORIDE 104  mmol/L  F  CO2 28.0 21.0-32.0 mmol/L  F  ANION GAP 7 0-18 mmol/L  F  BUN 19 9-23 mg/dL  F  CREATININE 1.32 0.70-1.30 mg/dL H F  CALCIUM 9.3 8.7-10.6 mg/dL  F  OSMOLALITY CALCULATED 296 275-295 mOsm/kg H F  E GFR CR 53 >=60 mL/min/1.73m2 L F  FASTING PATIENT BMP ANSWER No   F  CBC WITH DIFFERENTIAL WITH PLATELET  2025 12:57:54 PM  WBC 5.8 4.0-11.0 x10(3) uL  F  RED BLOOD COUNT 4.43 3.80-5.80 x10(6)uL  F  HGB 13.1 13.0-17.5 g/dL  F  HCT 39.0 39.0-53.0 %  F  PLATELETS 130.0 150.0-450.0 10(3)uL L F  IMMATURE PLATELET FRACTION 3.6 0.0-7.0 %  F  MEAN CELL VOLUME 88.0 80.0-100.0 fL  F  MEAN CORPUSCULAR HEMOGLOBIN 29.6 26.0-34.0 pg  F  MEAN CORPUSCULAR HGB CONC 33.6 31.0-37.0 g/dL  F  RED CELL DISTRIBUTION WIDTH CV 12.6 %  F  NEUTROPHIL ABS PRELIM 4.21 1.50-7.70 x10 (3) uL  F  NEUTROPHIL ABSOLUTE 4.21 1.50-7.70 x10(3) uL  F  LYMPHOCYTE ABSOLUTE 0.94 1.00-4.00 x10(3) uL L F  MONOCYTE ABSOLUTE 0.45 0.10-1.00 x10(3) uL  F  EOSINOPHIL ABSOLUTE 0.14 0.00-0.70 x10(3) uL  F  BASOPHIL ABSOLUTE 0.06 0.00-0.20 x10(3) uL  F  IMMATURE GRANULOCYTE COUNT 0.01 0.00-1.00 x10(3) uL  F  NEUTROPHIL % 72.5 %  F  LYMPHOCYTE % 16.2 %  F  MONOCYTE % 7.7 %  F  EOSINOPHIL % 2.4 %  F  BASOPHIL % 1.0 %  F  IMMATURE GRANULOCYTE RATIO % 0.2 %  F  COMP METABOLIC PANEL (14)  2025 07:11:21 PM  GLUCOSE 265 70-99 mg/dL H F  SODIUM 140 136-145 mmol/L  F  POTASSIUM 4.0 3.5-5.1 mmol/L  F  CHLORIDE 104  mmol/L  F  CO2 27.0 21.0-32.0  mmol/L  F  ANION GAP 9 0-18 mmol/L  F  BUN 18 9-23 mg/dL  F  CREATININE 1.36 0.70-1.30 mg/dL H F  CALCIUM 9.8 8.7-10.4 mg/dL  F  OSMOLALITY CALCULATED 301 275-295 mOsm/kg H F  E GFR CR 51 >=60 mL/min/1.73m2 L F  AST 19 <34 U/L  F  ALT 13 10-49 U/L  F  ALKALINE PHOSPHATASE 45  U/L  F  BILIRUBIN, TOTAL 0.8 0.2-1.1 mg/dL  F  TOTAL PROTEIN 6.8 5.7-8.2 g/dL  F  ALBUMIN 4.1 3.2-4.8 g/dL  F  GLOBULIN 2.7 2.0-3.5 g/dL  F  A/G RATIO 1.5 1.0-2.0  F  FASTING PATIENT CMP ANSWER Yes   F  LIPID PANEL  01/06/2025 07:11:21 PM  CHOLESTEROL 155 <200 mg/dL  F  HDL CHOL 38 40-59 mg/dL L F  TRIGLYCERIDES 130  mg/dL  F  LDL CHOLESTROL 94 <100 mg/dL  F  VLDL 21 0-30 mg/dL  F  NON HDL CHOL 117 <130 mg/dL  F  FASTING PATIENT LIPID ANSWER Yes   F  ASSAY, THYROID STIM HORMONE  01/06/2025 07:11:21 PM  TSH 1.820 0.550-4.780 uIU/mL  F  CBC, PLATELET; NO DIFFERENTIAL  01/06/2025 05:10:31 PM  WBC 8.1 4.0-11.0 x10(3) uL  F  RED BLOOD COUNT 4.56 3.80-5.80 x10(6)uL  F  HGB 13.5 13.0-17.5 g/dL  F  HCT 40.7 39.0-53.0 %  F  PLATELETS 147.0 150.0-450.0 10(3)uL L F  MEAN CELL VOLUME 89.3 80.0-100.0 fL  F  MEAN CORPUSCULAR HEMOGLOBIN 29.6 26.0-34.0 pg  F  MEAN CORPUSCULAR HGB CONC 33.2 31.0-37.0 g/dL  F  RED CELL DISTRIBUTION WIDTH CV 13.1 %  F  Diagnostics Details  Trans Thoracic Echocardiogram 02/06/2025  1.The study quality is good.    2.The left ventricle is normal in size, wall thickness, and global left ventricular systolic function. The left ventricular ejection fraction is 60-65%. The left ventricle diastolic function is normal. No regional wall motion abnormalities.    3.The left atrial diameter is moderately increased.    4.The right ventricle is normal in size. Right ventricular systolic function is normal.    5.The estimated pulmonary artery systolic pressure is 23 mmHg assuming a right atrial pressure of 3 mmHg.    6.Dilatation of the aortic root and ascending aorta is noted. Aortic root diameter is 4.5 cms. Ascending aorta  diameter is 4.2 cms.    7.Mild (1+) tricuspid regurgitation.    8.Mild to moderate (1-2+) aortic regurgitation.    9.Moderate (2+), eccentric, anteriorly directed, mitral regurgitation. Noted history of posterior mitral valve leaflet prolapse. However it is not demonstrated on today's echo.    ACTMonitoring 01/09/2025  1.This is an excellent quality study.    2.Predominant rhythm is atrial fibrillation.    3.The minimum heart rate recorded was 55 beats / minute. The maximum heart rate is 159 beats / minute. The mean heart rate is 73 beats / minute.    4.No evidence of AV block is noted.    5.Rare premature atrial contractions noted.    6.Afib [facility] 44%    7.Rare premature ventricular contractions noted.    8.33 episodes nonsustained VT vs AF with aberrancy - longest epiosde 4.2s and fastest 192bpm.    9.No pauses were noted.    10.75 SVT/AT events - longest episode 21.4s, fastest episode 170bpm.    11.Patient-triggered event associated with atrial fibrillation/flutter with RVR. Atrial arrhythmias were predominantly atrial flutter, though fibriillation cannot be ruled out.    CPOE Orders carried out by: Mildred Garcia  Care Providers: Teo Acosta DO, Mildred Garcia and Ezra Reinoso  Electronically Authenticated by  Teo Acosta DO  05/13/2025 02:04:22 PM  Disclaimer: Components of this note were documented using voice recognition system and are subject to errors not corrected at proofreading. Contact the author of this note for any clarifications.

## 2025-05-19 NOTE — ED INITIAL ASSESSMENT (HPI)
Patient to ED for low hgb, having black tarry stools that began yesterday. Patient is on eliquis. History of GI bleeds requiring transfusion.

## 2025-05-19 NOTE — ED QUICK NOTES
Orders for admission, patient is aware of plan and ready to go upstairs. Any questions, please call ED RN Madison at extension [phone].     Patient Covid vaccination status: Fully vaccinated     COVID Test Ordered in ED: None    COVID Suspicion at Admission: N/A    Running Infusions: Medication Infusions[1] None    Mental Status/LOC at time of transport: x4    Other pertinent information:   CIWA score: N/A   NIH score:  N/A             [1]

## 2025-05-19 NOTE — H&P
[facility]   part of [facility]    History and Physical     Shaq Song Patient Status:  Emergency    10/2/1940 MRN [id number]   Location [facility] EMERGENCY DEPARTMENT Attending Anton Chandler MD   Hosp Day # 0 PCP Alis Delgado MD     Chief Complaint: Black stool     History of Present Illness: Shaq Song is a 84 year old male with history of atrial fibrillation, bladder stones, depression, type 2 diabetes, enlarged prostate with urinary retention history, GERD, hypertension, osteoarthritis, history of pancreatic cancer with active chemo, history of PE, history of restless leg syndrome presenting with black stool.  Patient says starting Saturday morning he noticed dark stool.  He also mentions some pain with urination in the last few days as well.  Patient denies any chest pain palpitations, shortness of breath or productive cough.  Patient denies any significant abdominal pain, nausea, vomiting.  Patient recently did have an EGD with cauterization and clipping for GI bleed.  Due to this history and patient's symptoms patient came into the emergency room for further evaluation and treatment.  Patient denies any other significant positive review of systems.    Past Medical History:Past Medical History[1]     Past Surgical History: Past Surgical History[2]    Social History:  reports that he quit smoking about 67 years ago. His smoking use included cigarettes. He has never used smokeless tobacco. He reports that he does not currently use alcohol. He reports that he does not use drugs.no illegal drugs, , 3 children, not working, no cane or walker    Family History: Family History[3]  Mother passed  Father passed     Allergies: Allergies[4]    Medications:  Medications Ordered Prior to Encounter[5]    Review of Systems:   A comprehensive 14 point review of systems was completed.    Pertinent positives and negatives noted in the HPI.    Physical Exam:    BP (!) 187/82   Pulse 74    Temp 97 °F (36.1 °C) (Temporal)   Resp 18   Ht 5' 7\" (1.702 m)   Wt 150 lb (68 kg)   SpO2 100%   BMI 23.49 kg/m²   General: No acute distress. Alert and oriented   HEENT: Normocephalic atraumatic. Moist mucous membranes. EOM-I.   Neck:  No JVD. No carotid bruits.  Respiratory: Clear to auscultation bilaterally. No wheezes. No crackles  Cardiovascular: S1, S2. Regular rate and rhythm. No murmurs  Chest and Back: No tenderness or deformity.  Abdomen: Soft, nontender, nondistended.  Positive bowel sounds. No rebound, guarding  Neurologic: No focal neurological deficits. CNII-XII grossly intact. Sensation and strength intact  Musculoskeletal: Moves all extremities.  Extremities: No edema or tenderness of the LE  Integument: No new rashes or lesions.   Psychiatric: Appropriate mood and affect.      Diagnostic Data:      Labs:  Recent Labs   Lab 05/19/25  1435   WBC 3.5*   HGB 7.3*   MCV 89.3   .0*       Recent Labs   Lab 05/19/25  1435   *   BUN 16   CREATSERUM 1.16   CA 8.8   ALB 3.9      K 3.9      CO2 24.0   ALKPHO 47   AST 15   ALT 10   BILT 0.6   TP 6.1       Estimated Creatinine Clearance: 44.3 mL/min (based on SCr of 1.16 mg/dL).    No results for input(s): \"PTP\", \"INR\" in the last 168 hours.    No results for input(s): \"TROP\", \"CK\" in the last 168 hours.    Imaging: Imaging data reviewed in Epic.      ASSESSMENT / PLAN:   84 year old male with history of atrial fibrillation, bladder stones, depression, type 2 diabetes, enlarged prostate with urinary retention history, GERD, hypertension, osteoarthritis, history of pancreatic cancer with active chemo, history of PE, history of restless leg syndrome presenting with black stool.    Melena  -hx of GI bleed recently with cauterizing and clipping  -NPO  -IV PPI  -GI consulted  -check hgb q 8 hrs  -transfuse if hgb < 7  -hold AC    Positive troponin  -no chest pain   -may be demand ischemia from anemia  -cardiology consulted    Atrial  fibrillation hx  -hold AC  -diltiazem  -metoprolol     Hx Pancreatic Ca  -on active chemo, today was suppose to get a dose  -outpt oncology follow up    Hx PE  -resume AC when ok with GI  -consult heme/onc for Rec on AC given second GI bleed recently     Type 2 DM  -hold home oral meds  -low dose insulin sliding scale    HTN  -sbp stable  -diltiazem  -metoprolol     Quality:  DVT Prophylaxis: scd  CODE status:   Morocho: none    Plan of care discussed with patient and staff    Dispo: no discharge    Jeovany Tovar MD  [facility] Hospitalist  [phone]                           [1]   Past Medical History:   Actinic keratoses    Arrhythmia    AFib    Bladder stones    Colon polyp    DEPRESSION    Depression    Diabetes (HCC)    Enlarged prostate with urinary retention    Esophageal reflux    Morocho catheter in place    Gastritis    Gastro-esophageal reflux disease with esophagitis    Glucose intolerance    Hearing impaired person, bilateral    Hiatal hernia    High blood pressure    HYPERTRIGLYCERIDEMIA    Incontinence    Has a urinary cathether, can be incontinent of stool with urinary urgency    LAE (left atrial enlargement)    LAE (left atrial enlargement)    Left ventricular diastolic dysfunction with preserved systolic function    LVH (left ventricular hypertrophy)    Mild ascending aorta dilation    Osteoarthrosis, unspecified whether generalized or localized, unspecified site    Other and unspecified hyperlipidemia    Pancreas cancer (HCC)    Pancreas cyst (HCC)    bx    Pulmonary embolism (HCC)    Many years ago    RENAL DISEASE    RLS (restless legs syndrome)    Synovial cyst of lumbar facet joint    Unspecified essential hypertension    Vasovagal syncope    Visual impairment    glasses   [2]   Past Surgical History:  Procedure Laterality Date    Colonoscopy  06/22/2010    POLYP/5YRS, Dr Garcia    Egd and esd - internal  12/11/2017    Esophageal bx benign, pancreatic cyst aspirartion.  Dr Holland    Remove bladder  stone,>2.5cm  2025    with TURP    Tonsillectomy      Transurethral elec-surg prostatectom  2025    with bladder stones laser litho    Upper gi endoscopy - referral  2010    Upper gi endoscopy,exam  02/10/2025   [3]   Family History  Problem Relation Age of Onset    Cancer Father         esophageal    Cancer Mother         breast   [4]   Allergies  Allergen Reactions    Pcn [Penicillins]      \" FAINTED\"   [5]   Current Facility-Administered Medications on File Prior to Encounter   Medication Dose Route Frequency Provider Last Rate Last Admin    [COMPLETED] magnesium oxide (Mag-Ox) tab 400 mg  400 mg Oral Once Young Samuel MD   400 mg at 25 0815    [COMPLETED] potassium chloride (Klor-Con M20) tab 40 mEq  40 mEq Oral Once Young Samuel MD   40 mEq at 25 0816    [COMPLETED] magnesium oxide (Mag-Ox) tab 400 mg  400 mg Oral Once Acosta Teo, DO   400 mg at 25 0851    [COMPLETED] potassium chloride (Klor-Con M20) tab 40 mEq  40 mEq Oral Once Acosta, Teo, DO   40 mEq at 25 0851    [COMPLETED] dilTIAZem 5 mg BOLUS FROM BAG infusion  5 mg Intravenous Once rKystyna Ly NP   5 mg at 25 0445    [COMPLETED] iopamidol 76% (ISOVUE-370) injection for power injector  85 mL Intravenous ONCE PRN Carlos Collins MD   85 mL at 25 1554    [COMPLETED] magnesium oxide (Mag-Ox) tab 400 mg  400 mg Oral Once Carlos Collins MD   400 mg at 25 1806    [COMPLETED] heparin (Porcine) 51246 units/250 mL infusion (ACS/AFIB) INITIAL DOSE  12 Units/kg/hr Intravenous Once Bria Blancas APN 9 mL/hr at 25 1811 900 Units/hr at 25 181    [] heparin (Porcine) 60672 units/250mL infusion ACS/AFIB CONTINUOUS  200-3,000 Units/hr Intravenous Continuous Lucas Harris PA-C   Stopped at 25    [COMPLETED] potassium chloride (Klor-Con M20) tab 40 mEq  40 mEq Oral Once Teo Acosta DO   40 mEq at 25     [COMPLETED] magnesium oxide (Mag-Ox) tab 400 mg  400 mg Oral Once Carlos Collins MD   400 mg at 25 0829    [COMPLETED] Perflutren Lipid Microsphere (DEFINITY) 6.52 MG/ML injection 1.5 mL  1.5 mL Intravenous ONCE PRN Carlos Collins MD   1.5 mL at 25 1019    [COMPLETED] magnesium oxide (Mag-Ox) tab 400 mg  400 mg Oral Once Carlos Collins MD   400 mg at 25 0856    [COMPLETED] potassium chloride (Klor-Con) 20 MEQ oral powder 40 mEq  40 mEq Oral Q4H Carlos Collins MD   40 mEq at 25 1322    [COMPLETED] potassium chloride (Klor-Con M20) tab 40 mEq  40 mEq Oral Once Anton Farah MD   40 mEq at 25 0108    [COMPLETED] cefTRIAXone (Rocephin) 2 g in sodium chloride 0.9% 100 mL IVPB-ADDV  2 g Intravenous Once Anton Farah MD   Stopped at 25 0124    [] sodium chloride 0.9% infusion   Intravenous Continuous Glendy Recinos DO   Stopped at 25 1508    [COMPLETED] sodium chloride 0.9% infusion   Intravenous Once Carlos Collins MD 10 mL/hr at 25 0815 New Bag at 25 0815    [] potassium chloride (Klor-Con M20) tab 40 mEq  40 mEq Oral Q4H Carlos Collins MD   40 mEq at 25 1516    [COMPLETED] magnesium oxide (Mag-Ox) tab 400 mg  400 mg Oral Once Carlos Collins MD   400 mg at 25 1516    [COMPLETED] insulin aspart (NovoLOG) 100 Units/mL vial 8 Units  8 Units Subcutaneous Once Reese Freire MD   8 Units at 25 1430    [] insulin aspart (NovoLOG) 100 Units/mL vial             [COMPLETED] sodium chloride 0.9% infusion   Intravenous Once Carlos Collins MD 10 mL/hr at 25 1430 New Bag at 25 1430    [COMPLETED] dilTIAZem 10 mg BOLUS FROM BAG infusion  10 mg Intravenous Once Anton Farah MD   10 mg at 25 225    [COMPLETED] sodium chloride 0.9 % IV bolus 1,000 mL  1,000 mL Intravenous Once Anton Farah MD   Stopped at 25     [COMPLETED] pantoprazole (Protonix) 40 mg in sodium chloride 0.9% PF 10 mL IV push  40 mg Intravenous Once Anton Farah MD   40 mg at 04/17/25 2309    [COMPLETED] iopamidol 76% (ISOVUE-370) injection for power injector  100 mL Intravenous ONCE PRN Anton Farah MD   100 mL at 04/17/25 2256    [COMPLETED] metoprolol (Lopressor) 5 mg/5mL injection 5 mg  5 mg Intravenous Once Anton Farah MD   5 mg at 04/17/25 2325    [COMPLETED] digoxin (Lanoxin) 250 MCG/ML injection 250 mcg  250 mcg Intravenous Once Anton Farah MD   250 mcg at 04/18/25 0002    [COMPLETED] lidocaine (Urojet) 2 % urethral jelly 10 mL  10 mL Urethral Once Sarah Roberto DO   10 mL at 02/27/25 0546    [COMPLETED] potassium chloride (Klor-Con) 20 MEQ oral powder 40 mEq  40 mEq Oral Once Chloe De La Rosa MD   40 mEq at 02/22/25 1258    [COMPLETED] hydrALAzine (Apresoline) 20 mg/mL injection 10 mg  10 mg Intravenous Once Chloe De La Rosa MD   10 mg at 02/22/25 1254    [COMPLETED] potassium chloride (Klor-Con M20) tab 40 mEq  40 mEq Oral Once Chloe De La Rosa MD   40 mEq at 02/21/25 0903    [COMPLETED] phenazopyridine (Pyridium) tab 100 mg  100 mg Oral TID CC Princess Mijares PA-C   100 mg at 02/22/25 1258    [COMPLETED] ceFAZolin (Ancef) 2g in 10mL IV syringe premix  2 g Intravenous Once Jose Palencia MD   2 g at 02/20/25 0716    [COMPLETED] insulin aspart (NovoLOG) 100 Units/mL vial 1-5 Units  1-5 Units Subcutaneous Once Xavier Roman CRNA   3 Units at 02/20/25 0946    [COMPLETED] insulin aspart (NovoLOG) 100 Units/mL vial             [COMPLETED] fentaNYL (Sublimaze) 50 mcg/mL injection             [COMPLETED] HYDROmorphone (Dilaudid) 1 MG/ML injection              Current Outpatient Medications on File Prior to Encounter   Medication Sig Dispense Refill    metoprolol tartrate 100 MG Oral Tab Take 1 tablet (100 mg total) by mouth 2x Daily(Beta Blocker). 60 tablet 2    dilTIAZem  MG Oral Capsule SR 24 Hr Take 1 capsule  (120 mg total) by mouth daily. 90 capsule 0    furosemide 20 MG Oral Tab Take 1 tablet (20 mg total) by mouth daily.      pantoprazole 40 MG Oral Tab EC Take 1 tablet (40 mg total) by mouth 2 (two) times daily before meals. 180 tablet 3    ELIQUIS 5 MG Oral Tab Take 1 tablet (5 mg total) by mouth 2 (two) times daily.      ondansetron (ZOFRAN) 8 MG tablet Take 1 tablet (8 mg total) by mouth every 8 (eight) hours as needed for Nausea.      finasteride 5 MG Oral Tab Take 1 tablet (5 mg total) by mouth in the morning.      metFORMIN 500 MG Oral Tab Take 2 tablets (1,000 mg total) by mouth daily with breakfast.      amitriptyline 10 MG Oral Tab Take 1 tablet (10 mg total) by mouth every evening. 90 tablet 3    Cholecalciferol (VITAMIN D3 OR) Take 1 tablet by mouth in the morning.      MULTIVITAMINS OR TABS Take 1 tablet by mouth in the morning.

## 2025-05-19 NOTE — ED QUICK NOTES
Found pt standing outside of ED hospital room. Walked pt back to room. Pt states \"I want to go home\" will relay pt wishes with MD Chandler.

## 2025-05-19 NOTE — ED QUICK NOTES
Gave pt phone so he could contact his wife and giver her an update on POC. Pt has talked to doctor and has decided to be admitted per doctor advice.

## 2025-05-19 NOTE — ED PROVIDER NOTES
Patient Seen in: [facility] Emergency Department      History     Chief Complaint   Patient presents with    Abnormal Result     Stated Complaint: Low hemoglobin    Subjective:   HPI  History of Present Illness            84-year-old male comes to the hospital with black tarry stools.  He had 1 Saturday and then 1 again today.  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  About a month ago he had an EGD and had his cauterized and clipped.  He was told he was anemic.  He denies any headache symptoms.  No chest pain or trouble breathing.  Denies any abdominal pains.  No nausea or vomiting.  He still having chemo for his pancreatic cancer which she was due to have today.  He is denying any other specific complaint.  There is no nausea and vomiting.      Objective:     Past Medical History:    Actinic keratoses    Arrhythmia    AFib    Bladder stones    Colon polyp    DEPRESSION    Depression    Diabetes (HCC)    Enlarged prostate with urinary retention    Esophageal reflux    Morocho catheter in place    Gastritis    Gastro-esophageal reflux disease with esophagitis    Glucose intolerance    Hearing impaired person, bilateral    Hiatal hernia    High blood pressure    HYPERTRIGLYCERIDEMIA    Incontinence    Has a urinary cathether, can be incontinent of stool with urinary urgency    LAE (left atrial enlargement)    LAE (left atrial enlargement)    Left ventricular diastolic dysfunction with preserved systolic function    LVH (left ventricular hypertrophy)    Mild ascending aorta dilation    Osteoarthrosis, unspecified whether generalized or localized, unspecified site    Other and unspecified hyperlipidemia    Pancreas cancer (HCC)    Pancreas cyst (HCC)    bx    Pulmonary embolism (HCC)    Many years ago    RENAL DISEASE    RLS (restless legs syndrome)    Synovial cyst of lumbar facet joint    Unspecified essential hypertension    Vasovagal  Referral fax   syncope    Visual impairment    glasses              Past Surgical History:   Procedure Laterality Date    Colonoscopy  2010    POLYP/5YRS, Dr Garcia    Egd and esd - internal  2017    Esophageal bx benign, pancreatic cyst aspirartion.  Dr Holland    Remove bladder stone,>2.5cm  2025    with TURP    Tonsillectomy      Transurethral elec-surg prostatectom  2025    with bladder stones laser litho    Upper gi endoscopy - referral  2010    Upper gi endoscopy,exam  02/10/2025                Social History     Socioeconomic History    Marital status:    Tobacco Use    Smoking status: Former     Current packs/day: 0.00     Types: Cigarettes     Quit date: 1958     Years since quittin.4    Smokeless tobacco: Never   Vaping Use    Vaping status: Never Used   Substance and Sexual Activity    Alcohol use: Not Currently     Comment: 2-3/MONTH    Drug use: No    Sexual activity: Yes     Comment:      Social Drivers of Health     Food Insecurity: No Food Insecurity (2025)    NCSS - Food Insecurity     Worried About Running Out of Food in the Last Year: No     Ran Out of Food in the Last Year: No   Transportation Needs: No Transportation Needs (2025)    NCSS - Transportation     Lack of Transportation: No   Housing Stability: Not At Risk (2025)    NCSS - Housing/Utilities     Has Housing: Yes     Worried About Losing Housing: No     Unable to Get Utilities: No                                Physical Exam     ED Triage Vitals [25 1416]   BP (!) 179/65   Pulse 75   Resp 16   Temp 97 °F (36.1 °C)   Temp src Temporal   SpO2 100 %   O2 Device None (Room air)       Current Vitals:   Vital Signs  BP: (!) 187/82  Pulse: 74  Resp: 18  Temp: 97 °F (36.1 °C)  Temp src: Temporal  MAP (mmHg): (!) 101    Oxygen Therapy  SpO2: 100 %  O2 Device: None (Room air)          Physical Exam  HEENT; NCAT, EOMI, throat clear, neck supple, no LAD, no JVD  Heart S1S2 RRR  lungs:  CTAB  abd: Soft NT, ND,  NABS without rebound or guarding  Ext no C/C/E          ED Course     Labs Reviewed   CBC WITH DIFFERENTIAL WITH PLATELET - Abnormal; Notable for the following components:       Result Value    WBC 3.5 (*)     RBC 2.42 (*)     HGB 7.3 (*)     HCT 21.6 (*)     .0 (*)     Lymphocyte Absolute 0.85 (*)     All other components within normal limits   COMP METABOLIC PANEL (14) - Abnormal; Notable for the following components:    Glucose 293 (*)     Calculated Osmolality 300 (*)     All other components within normal limits   TROPONIN I HIGH SENSITIVITY - Abnormal; Notable for the following components:    Troponin I (High Sensitivity) 106 (*)     All other components within normal limits   LIPID PANEL - Abnormal; Notable for the following components:    HDL Cholesterol 38 (*)     All other components within normal limits   TYPE AND SCREEN    Narrative:     The following orders were created for panel order Type and screen.  Procedure                               Abnormality         Status                     ---------                               -----------         ------                     ABORH (Blood Type)[[id number]]                               Final result               Antibody Screen[[id number]]                                  Final result                 Please view results for these tests on the individual orders.   ABORH (BLOOD TYPE)   ANTIBODY SCREEN   PREPARE RBC   RAINBOW DRAW LAVENDER   RAINBOW DRAW LIGHT GREEN   RAINBOW DRAW BLUE     EKG    Rate, intervals and axes as noted on EKG Report.  Rate: 66  Rhythm: Sinus Rhythm  Reading: , QRS of 92, patient has normal sinus rhythm without acute ischemic change.           ED Course as of 05/19/25 1712  ------------------------------------------------------------  Time: 05/19 1711  Comment: While here the patient was typed and screened.  His troponin was 106.  His glucose was 293.  His hemoglobin was 7.3 with 100 platelets.   I have ordered a blood transfusion.  The patient had a chest ray done that upper central region no acute cardiopulmonary process.  I read the radiology report as well.  Ibuprin tonics had been given.  I spoke with the hospitalist as well as cardiology and GI.     Results            XR CHEST AP PORTABLE  (CPT=71045)  Result Date: 5/19/2025  PROCEDURE:  XR CHEST AP PORTABLE  (CPT=71045)  TECHNIQUE:  AP chest radiograph was obtained.  COMPARISON:  [facility] , XR, XR CHEST AP PORTABLE  (CPT=71045), 4/17/2025, 10:07 PM.  INDICATIONS:  Low hemoglobin  PATIENT STATED HISTORY: (As transcribed by Technologist)  Pt. with  Low hemoglobin .    FINDINGS:  Heart size is within normal limits.  Pleural spaces appear clear.  Mediastinal and hilar contours are normal.  No focal consolidation.  Support devices appear overall stable.            CONCLUSION:  Overall stable appearance of the chest.   LOCATION:  [id number]      Dictated by (CST): Peyman Garcia MD on 5/19/2025 at 3:11 PM     Finalized by (CST): Peyman Garcia MD on 5/19/2025 at 3:14 PM       CT ABD (ATTN PANCREAS) W/WO+PELVIS W/(SYL=10079)  Result Date: 4/21/2025  PROCEDURE:  CT ABD (ATTN PANCREAS) W/WO+PELVIS W/(ORR=65513)  COMPARISON:  None.  INDICATIONS:  evaluate pancreatic mass  TECHNIQUE:  CT scanning was performed of the abdomen from the dome of the diaphragm to the iliac crests without and with non-ionic intravenous contrast material, and CT images of the pelvis from iliac crests to the pubic symphysis with non-ionic intravenous contrast material.  Dose reduction techniques were used. Dose information is transmitted to the ACR (American College of Radiology) NRDR (National Radiology Data Registry) which includes the Dose Index Registry.  PATIENT STATED HISTORY:(As transcribed by Technologist)  evaluate pancreatic mass.  patient states pain in umbilical area.    CONTRAST USED:  85cc of Isovue 370  FINDINGS:  LUNG BASE:  Mild left and trace right pleural effusions.  Calcified  coronary artery disease is noted.  Scattered atelectasis. LIVER:  On image 32 of series 7, there are hypodense nodules within the lateral margin of the left hepatic lobe measuring 8 and 9 mm respectively.  These are too small to accurately characterize.  Correlation with prior outside imaging is suggested for further assessment. BILIARY:  Unremarkable. SPLEEN:  Unremarkable. PANCREAS:  Per the electronic medical record, patient has a known adenocarcinoma of the pancreatic tail.  No prior outside imaging is available for review.  There is a hypodense mass at the pancreatic tail inseparable from the medial margin of the spleen  which likely encases splenic vasculature.  This mass likely corresponds with the known adenocarcinoma.  This mass measures 3.7 x 3.3 x 3.1 cm in size.  There is a 16 x 12 mm cystic lesion within the uncinate process of the pancreas.  There are additional smaller cystic lesions within the pancreas. ADRENALS:  Unremarkable. KIDNEYS:  There is a nonobstructing stone at the left kidney measuring 12 mm.  There are multiple right renal cysts. AORTA/VASCULAR:  Extensive atherosclerosis.  The far lateral margin of the splenic vein may be occluded. RETROPERITONEUM:  Unremarkable. BOWEL/MESENTERY:  Probable surgical clips at the gastric fundus.  No evidence of a bowel obstruction.  Colonic diverticulosis. ABDOMINAL WALL:  Unremarkable. PELVIC ORGANS:  Enlarged prostate.  Possible postoperative changes of the prostate gland. LYMPH NODES:  Unremarkable. BONES:  Degenerative changes in the spine. OTHER:  A trace amount of fluid is seen within the pelvis.             CONCLUSION:   1. Per the electronic medical record, patient has a known adenocarcinoma of the pancreatic tail.  No prior outside imaging is available for review.  There is a hypodense mass at the pancreatic tail inseparable from the medial margin of the spleen which likely encases splenic vasculature.  This mass likely corresponds with the known  adenocarcinoma.  This mass measures 3.7 x 3.3 x 3.1 cm in size.  The far lateral margin of the splenic vein may be occluded.  2. There is a 16 x 12 mm cystic lesion within the uncinate process of the pancreas.  This may represent IPMN or cystic mucinous neoplasm among other etiologies.  There are additional smaller cystic lesions within the pancreas.  Attention to these lesions  is suggested at the time of follow-up imaging.  3. There are hypodense nodules within the lateral margin of the left hepatic lobe measuring 8 and 9 mm respectively.  These are too small to accurately characterize.  Correlation with prior outside imaging is suggested for further assessment.  4. A trace amount of free fluid is seen within the pelvis.   LOCATION:  [id number]   Dictated by (CST): Stromberg, LeRoy, MD on 2025 at 4:46 PM     Finalized by (CST): Stromberg, LeRoy, MD on 2025 at 5:09 PM       CARD ECHO 2D DOPPLER CONTRAST (CPT=93306)  Result Date: 2025  Transthoracic Echocardiogram Name:Shaq Song Date: 2025 :  10/02/1940 Ht:  (67in)  BP: 144 / 111 MRN:  [id number]    Age:  84years    Wt:  (159lb) HR: 103bpm Loc:  [facility]       Gndr: M          BSA: 1.83m^2 Sonographer: LIBORIO Quesada MS Ordering:    Lucas Harris Consulting:  Teo Acosta ---------------------------------------------------------------------------- History/Indications:   Atrial fibrillation.  Mitral regurgitation. Thoracic aortic aneurysm.  Risk factors:  Hypertension. Dyslipidemia. ---------------------------------------------------------------------------- Procedure information:  A transthoracic complete 2D study was performed. Additional evaluation included M-mode, complete spectral Doppler, and color Doppler.  Patient status:  Inpatient.  Location:  Room 8603.    Comparison was made to the study of 2019.    This was a routine study. Transthoracic echocardiography for ventricular function evaluation and assessment of valvular function. Image  quality was adequate. ECG rhythm:   Atrial fibrillation ---------------------------------------------------------------------------- Conclusions: 1.  Left ventricle: The cavity size was normal. Wall thickness was normal.     Systolic function was normal. The estimated ejection fraction was     50-55%, by 3D assessment. Unable to assess LV diastolic function due to     heart rhythm. 2.  Left ventricle: There is hypokinesis of the basal-mid inferoseptal and     entire inferior walls. 3.  Right ventricle: Systolic function was normal. 4.  Left atrium: The left atrial volume was markedly increased. 5.  Right atrium: The atrium was moderately dilated. 6.  Aortic valve: The valve was trileaflet. The leaflets were mildly     calcified. There was mild regurgitation. 7.  Aortic root: The aortic root was 4.1cm diameter. 8.  Ascending aorta: The ascending aorta was 4.2cm diameter. 9.  Mitral valve: Prolapse, involving the posterior leaflet. There was     moderate regurgitation. 10. Pulmonary arteries: Systolic pressure was mildly increased, in the range     of 35mm Hg to 40mm Hg. Impressions:  This study is compared with previous dated 12/19/2019: Wall motion abnormality is new. Mitral regurgitation was noted previously. * ---------------------------------------------------------------------------- * Findings: Left ventricle:  The cavity size was normal. Wall thickness was normal. Systolic function was normal. The estimated ejection fraction was 50-55%, by 3D assessment. Regional wall motion:   There is hypokinesis of the basal-mid inferoseptal and entire inferior walls.   Unable to assess LV diastolic function due to heart rhythm. Left atrium:  The atrium was markedly dilated. The left atrial volume was markedly increased. Right ventricle:  The cavity size was normal. Systolic function was normal. Right atrium:  The atrium was moderately dilated. Mitral valve:  The annulus was mildly calcified. The leaflets were mildly  calcified. Leaflet separation was normal.  Prolapse, involving the posterior leaflet.  Doppler:  Transvalvular velocity was within the normal range. There was no evidence for stenosis. There was moderate regurgitation. Aortic valve:   The valve was trileaflet. The leaflets were mildly calcified. Cusp separation was normal.  Doppler:  Transvalvular velocity was within the normal range. There was no evidence for stenosis. There was mild regurgitation. Tricuspid valve:  The valve is structurally normal. Leaflet separation was normal.  Doppler:  Transvalvular velocity was within the normal range. There was no evidence for stenosis. There was mild regurgitation. Pulmonic valve:   The valve is structurally normal. Cusp separation was normal.  Doppler:  Transvalvular velocity was within the normal range. There was no evidence for stenosis. There was trivial regurgitation. Pericardium:   There was no pericardial effusion. Aorta: Aortic root: The aortic root was 4.1cm diameter. Ascending aorta: The ascending aorta was 4.2cm diameter. Pulmonary arteries: The main pulmonary artery was normal-sized. Systolic pressure was mildly increased, in the range of 35mm Hg to 40mm Hg. Systemic veins:  Central venous respirophasic diameter changes are in the normal range (>50%). Inferior vena cava: The IVC was normal-sized. ---------------------------------------------------------------------------- Measurements  Left ventricle         Value        Ref       12/19/2019  LV end-diastolic       191   ml     --------- ----------  volume, 3D  LV end-systolic        115   ml     --------- ----------  volume, 3D  LV ejection        (L) 40    %      52 - 72   ----------  fraction, 3D  LV end-diastolic   (H) 104   ml/m^2 <=79      ----------  volume/bsa, 3D  LV end-systolic    (H) 63    ml/m^2 <=32      ----------  volume/bsa, 3D  LV wall mass, 3D       199   g      --------- ----------  LV wall mass/bsa,      108   g/m^2  --------- ----------  3D   IVS thickness, ED,     0.8   cm     0.6 - 1.0 0.8  PLAX  LV ID, ED, PLAX        5.4   cm     4.2 - 5.8 5.4  LV ID, ES, PLAX    (H) 4.3   cm     2.5 - 4.0 3.4  LV PW thickness,       0.9   cm     0.6 - 1.0 0.9  ED, PLAX  IVS/LV PW ratio,       0.94         --------- ----------  ED, PLAX  LV PW/LV ID ratio,     0.16         --------- ----------  ED, PLAX  LV ejection        (L) 43    %      52 - 72   67  fraction  Aortic root            Value        Ref       12/19/2019  Aortic root ID, ED (H) 4.1   cm     2.5 - 4.0 ----------  Ascending aorta        Value        Ref       12/19/2019  Ascending aorta    (H) 4.2   cm     2.2 - 3.8 ----------  ID, A-P, ED  Left atrium            Value        Ref       12/19/2019  LA ID, A-P, ES     (H) 4.4   cm     3.0 - 4.0 4.7  LA volume, S       (H) 180   ml     18 - 58   139  LA volume/bsa, S   (H) 98    ml/m^2 16 - 34   69  LA volume, ES, 1-p (H) 173   ml     18 - 58   150  A4C  LA volume, ES, 1-p (H) 186   ml     18 - 58   122  A2C  LA volume, ES, A/L     191   ml     --------- ----------  LA volume/bsa, ES, (H) 104   ml/m^2 16 - 34   ----------  A/L  LA/aortic root         1.07         --------- ----------  ratio  LA volume, ES, 3D  (H) 181   ml     18 - 58   ----------  LA volume/bsa, ES,     99    ml/m^2 --------- ----------  3D  Pulmonary artery       Value        Ref       12/19/2019  PA pressure, S, DP     39    mm Hg  --------- ----------  Tricuspid valve        Value        Ref       12/19/2019  Tricuspid regurg   (H) 3     m/sec  <=2.8     ----------  peak velocity  Tricuspid peak         36    mm Hg  --------- ----------  RV-RA gradient  Systemic veins         Value        Ref       12/19/2019  Estimated CVP          3     mm Hg  --------- 5  Right ventricle        Value        Ref       12/19/2019  TAPSE, 2D              2.38  cm     >=1.70    ----------  RV pressure, S, DP     39    mm Hg  --------- ---------- Legend: (L)  and  (H)  brenda values outside specified  reference range. ---------------------------------------------------------------------------- Prepared and electronically signed by Jon Wayne 04/20/2025 10:45     Medications   pantoprazole (Protonix) 80 mg in sodium chloride 0.9% 100 mL IV bolus (0 mg Intravenous Stopped 5/19/25 1519)                         MDM      Differential diagnosis includes upper versus lower GI bleed.  Likely this is secondary to his invasive pancreatic cancer in his stomach again.  The patient was ordered blood transfusion.  Cardiologist cleared for stopping Eliquis and the patient be admitted for blood that I have ordered as well as further workup and care.      Critical Care Note:  The patient arrived with a condition with significant morbidity and mortality associated. The services I provided  were to promote improvement and reduce mortality specifically involving complex record review, complex medical decisions and interventions, and consultations outside the regular procedures and care normally rendered for 45 minutes of critical care time      This note was prepared using Dragon Medical voice recognition dictation software.  As a result errors may occur.  When identified to these areas have been corrected.  While every attempt is made to correct errors during dictation discrepancies may still exist.  Please contact if there are any errors.    Admission disposition: 5/19/2025  5:12 PM           Medical Decision Making      Disposition and Plan     Clinical Impression:  1. Gastrointestinal hemorrhage, unspecified gastrointestinal hemorrhage type    2. Anemia, unspecified type    3. Malignant neoplasm of pancreas, unspecified location of malignancy (HCC)         Disposition:  Admit  5/19/2025  5:12 pm    Follow-up:  No follow-up provider specified.        Medications Prescribed:  Current Discharge Medication List                Supplementary Documentation:         Hospital Problems       Present on Admission  Date Reviewed:  11/15/2022          ICD-10-CM Noted POA    * (Principal) Gastrointestinal hemorrhage, unspecified gastrointestinal hemorrhage type K92.2 5/19/2025 Unknown

## 2025-05-20 ENCOUNTER — ANESTHESIA (OUTPATIENT)
Dept: ENDOSCOPY | Facility: HOSPITAL | Age: 85
End: 2025-05-20
Payer: MEDICARE

## 2025-05-20 ENCOUNTER — ANESTHESIA EVENT (OUTPATIENT)
Dept: ENDOSCOPY | Facility: HOSPITAL | Age: 85
End: 2025-05-20
Payer: MEDICARE

## 2025-05-20 VITALS
WEIGHT: 150 LBS | TEMPERATURE: 98 F | DIASTOLIC BLOOD PRESSURE: 66 MMHG | HEART RATE: 68 BPM | BODY MASS INDEX: 23.54 KG/M2 | OXYGEN SATURATION: 96 % | RESPIRATION RATE: 18 BRPM | HEIGHT: 67 IN | SYSTOLIC BLOOD PRESSURE: 158 MMHG

## 2025-05-20 LAB
ANION GAP SERPL CALC-SCNC: 7 MMOL/L (ref 0–18)
BASOPHILS # BLD AUTO: 0.01 X10(3) UL (ref 0–0.2)
BASOPHILS NFR BLD AUTO: 0.5 %
BUN BLD-MCNC: 12 MG/DL (ref 9–23)
CALCIUM BLD-MCNC: 8.3 MG/DL (ref 8.7–10.6)
CHLORIDE SERPL-SCNC: 109 MMOL/L (ref 98–112)
CO2 SERPL-SCNC: 25 MMOL/L (ref 21–32)
CREAT BLD-MCNC: 1.02 MG/DL (ref 0.7–1.3)
DEPRECATED HBV CORE AB SER IA-ACNC: 256 NG/ML (ref 50–336)
EGFRCR SERPLBLD CKD-EPI 2021: 72 ML/MIN/1.73M2 (ref 60–?)
EOSINOPHIL # BLD AUTO: 0.05 X10(3) UL (ref 0–0.7)
EOSINOPHIL NFR BLD AUTO: 2.7 %
ERYTHROCYTE [DISTWIDTH] IN BLOOD BY AUTOMATED COUNT: 16.3 %
GLUCOSE BLD-MCNC: 158 MG/DL (ref 70–99)
GLUCOSE BLD-MCNC: 170 MG/DL (ref 70–99)
GLUCOSE BLD-MCNC: 185 MG/DL (ref 70–99)
HCT VFR BLD AUTO: 21.9 % (ref 39–53)
HGB BLD-MCNC: 7.1 G/DL (ref 13–17.5)
HGB BLD-MCNC: 7.2 G/DL (ref 13–17.5)
HGB BLD-MCNC: 9.5 G/DL (ref 13–17.5)
IMM GRANULOCYTES # BLD AUTO: 0.01 X10(3) UL (ref 0–1)
IMM GRANULOCYTES NFR BLD: 0.5 %
IRON SATN MFR SERPL: 14 % (ref 20–50)
IRON SERPL-MCNC: 32 UG/DL (ref 65–175)
LYMPHOCYTES # BLD AUTO: 0.65 X10(3) UL (ref 1–4)
LYMPHOCYTES NFR BLD AUTO: 34.8 %
MAGNESIUM SERPL-MCNC: 1.3 MG/DL (ref 1.6–2.6)
MCH RBC QN AUTO: 29.1 PG (ref 26–34)
MCHC RBC AUTO-ENTMCNC: 32.4 G/DL (ref 31–37)
MCV RBC AUTO: 89.8 FL (ref 80–100)
MONOCYTES # BLD AUTO: 0.26 X10(3) UL (ref 0.1–1)
MONOCYTES NFR BLD AUTO: 13.9 %
NEUTROPHILS # BLD AUTO: 0.89 X10 (3) UL (ref 1.5–7.7)
NEUTROPHILS # BLD AUTO: 0.89 X10(3) UL (ref 1.5–7.7)
NEUTROPHILS NFR BLD AUTO: 47.6 %
OSMOLALITY SERPL CALC.SUM OF ELEC: 295 MOSM/KG (ref 275–295)
PLATELET # BLD AUTO: 74 10(3)UL (ref 150–450)
PLATELETS.RETICULATED NFR BLD AUTO: 3.2 % (ref 0–7)
POTASSIUM SERPL-SCNC: 3.4 MMOL/L (ref 3.5–5.1)
RBC # BLD AUTO: 2.44 X10(6)UL (ref 3.8–5.8)
SODIUM SERPL-SCNC: 141 MMOL/L (ref 136–145)
TOTAL IRON BINDING CAPACITY: 232 UG/DL (ref 250–425)
TRANSFERRIN SERPL-MCNC: 174 MG/DL (ref 215–365)
WBC # BLD AUTO: 1.9 X10(3) UL (ref 4–11)

## 2025-05-20 PROCEDURE — 80048 BASIC METABOLIC PNL TOTAL CA: CPT | Performed by: HOSPITALIST

## 2025-05-20 PROCEDURE — 85025 COMPLETE CBC W/AUTO DIFF WBC: CPT | Performed by: HOSPITALIST

## 2025-05-20 PROCEDURE — 83735 ASSAY OF MAGNESIUM: CPT | Performed by: HOSPITALIST

## 2025-05-20 PROCEDURE — 83550 IRON BINDING TEST: CPT | Performed by: INTERNAL MEDICINE

## 2025-05-20 PROCEDURE — 36430 TRANSFUSION BLD/BLD COMPNT: CPT

## 2025-05-20 PROCEDURE — 82728 ASSAY OF FERRITIN: CPT | Performed by: INTERNAL MEDICINE

## 2025-05-20 PROCEDURE — 83540 ASSAY OF IRON: CPT | Performed by: INTERNAL MEDICINE

## 2025-05-20 PROCEDURE — 82962 GLUCOSE BLOOD TEST: CPT

## 2025-05-20 PROCEDURE — 85018 HEMOGLOBIN: CPT | Performed by: HOSPITALIST

## 2025-05-20 PROCEDURE — 0DJ08ZZ INSPECTION OF UPPER INTESTINAL TRACT, VIA NATURAL OR ARTIFICIAL OPENING ENDOSCOPIC: ICD-10-PCS | Performed by: INTERNAL MEDICINE

## 2025-05-20 RX ORDER — POTASSIUM CHLORIDE 29.8 MG/ML
40 INJECTION INTRAVENOUS ONCE
Status: DISCONTINUED | OUTPATIENT
Start: 2025-05-20 | End: 2025-05-20

## 2025-05-20 RX ORDER — LIDOCAINE HYDROCHLORIDE 10 MG/ML
INJECTION, SOLUTION EPIDURAL; INFILTRATION; INTRACAUDAL; PERINEURAL AS NEEDED
Status: DISCONTINUED | OUTPATIENT
Start: 2025-05-20 | End: 2025-05-20 | Stop reason: SURG

## 2025-05-20 RX ORDER — SODIUM CHLORIDE, SODIUM LACTATE, POTASSIUM CHLORIDE, CALCIUM CHLORIDE 600; 310; 30; 20 MG/100ML; MG/100ML; MG/100ML; MG/100ML
INJECTION, SOLUTION INTRAVENOUS CONTINUOUS
Status: DISCONTINUED | OUTPATIENT
Start: 2025-05-20 | End: 2025-05-20

## 2025-05-20 RX ORDER — FUROSEMIDE 10 MG/ML
40 INJECTION INTRAMUSCULAR; INTRAVENOUS ONCE
Status: COMPLETED | OUTPATIENT
Start: 2025-05-20 | End: 2025-05-20

## 2025-05-20 RX ORDER — SODIUM CHLORIDE 9 MG/ML
INJECTION, SOLUTION INTRAVENOUS ONCE
Status: COMPLETED | OUTPATIENT
Start: 2025-05-20 | End: 2025-05-20

## 2025-05-20 RX ORDER — NALOXONE HYDROCHLORIDE 0.4 MG/ML
0.08 INJECTION, SOLUTION INTRAMUSCULAR; INTRAVENOUS; SUBCUTANEOUS ONCE AS NEEDED
Status: DISCONTINUED | OUTPATIENT
Start: 2025-05-20 | End: 2025-05-20 | Stop reason: HOSPADM

## 2025-05-20 RX ORDER — POTASSIUM CHLORIDE 1500 MG/1
40 TABLET, EXTENDED RELEASE ORAL ONCE
Status: COMPLETED | OUTPATIENT
Start: 2025-05-20 | End: 2025-05-20

## 2025-05-20 RX ORDER — MAGNESIUM OXIDE 400 MG/1
800 TABLET ORAL ONCE
Status: COMPLETED | OUTPATIENT
Start: 2025-05-20 | End: 2025-05-20

## 2025-05-20 RX ORDER — POTASSIUM CHLORIDE 1500 MG/1
40 TABLET, EXTENDED RELEASE ORAL EVERY 4 HOURS
Status: DISCONTINUED | OUTPATIENT
Start: 2025-05-20 | End: 2025-05-20

## 2025-05-20 RX ORDER — MAGNESIUM SULFATE HEPTAHYDRATE 40 MG/ML
2 INJECTION, SOLUTION INTRAVENOUS ONCE
Status: COMPLETED | OUTPATIENT
Start: 2025-05-20 | End: 2025-05-20

## 2025-05-20 RX ORDER — MAGNESIUM OXIDE 400 MG/1
800 TABLET ORAL ONCE
Status: DISCONTINUED | OUTPATIENT
Start: 2025-05-20 | End: 2025-05-20

## 2025-05-20 RX ADMIN — LIDOCAINE HYDROCHLORIDE 50 MG: 10 INJECTION, SOLUTION EPIDURAL; INFILTRATION; INTRACAUDAL; PERINEURAL at 08:45:00

## 2025-05-20 NOTE — CONSULTS
[facility]  Report of Consultation    Shaq Song Patient Status:  Inpatient    10/2/1940 MRN [id number]   Location [facility] Attending Jeovany Tovar MD   Hosp Day # 1 PCP Alis Delgado MD     Reason for Consultation:  GI bleeding  Pancreatic cancer  VTE    History of Present Illness:  Shaq Song is a a(n) 84 year old male with history of afib, depression, DM2, BPH, GERD, HTN, OA.  History of pancreatic cancer on chemotherapy.  Hx of PE  He presented with dark stools starting last weekend.  Also noted dysuria.  No sob, chest pain, rash, HA    Recently had EGD with cauterization and clipping for GI bleeed    Seen by GI this admission.  Getting EGD this am.    Hemoglobin noted to be 7 grams.     History:  Past Medical History[1]  Past Surgical History[2]  Family History[3]   reports that he quit smoking about 67 years ago. His smoking use included cigarettes. He has never used smokeless tobacco. He reports that he does not currently use alcohol. He reports that he does not use drugs.    Allergies:  Allergies[4]    Medications:  Current Hospital Medications[5]    Review of Systems:  A 10-point review of systems was done with pertinent positives and negatives per the HPI.    Physical Exam:   Blood pressure 158/75, pulse 64, temperature 97.9 °F (36.6 °C), temperature source Oral, resp. rate 16, height 5' 7\" (1.702 m), weight 150 lb (68 kg), SpO2 99%.          Laboratory Data:    Lab Results   Component Value Date    WBC 1.9 2025    HGB 7.1 2025    HCT 21.9 2025    PLT 74.0 2025    CREATSERUM 1.02 2025    BUN 12 2025     2025    K 3.4 2025     2025    CO2 25.0 2025     2025    CA 8.3 2025    ALB 3.9 2025    ALKPHO 47 2025    BILT 0.6 2025    TP 6.1 2025    AST 15 2025    ALT 10 2025    MG 1.3 2025    TROPHS 106 2025       Imaging:  XR  CHEST AP PORTABLE  (CPT=71045)  Result Date: 5/19/2025  CONCLUSION:  Overall stable appearance of the chest.   LOCATION:  [id number]      Dictated by (CST): Peyman Garcia MD on 5/19/2025 at 3:11 PM     Finalized by (CST): Peyman Garcia MD on 5/19/2025 at 3:14 PM       CT ABD (ATTN PANCREAS) W/WO+PELVIS W/(UJX=69105)  Result Date: 4/21/2025  CONCLUSION:   1. Per the electronic medical record, patient has a known adenocarcinoma of the pancreatic tail.  No prior outside imaging is available for review.  There is a hypodense mass at the pancreatic tail inseparable from the medial margin of the spleen which likely encases splenic vasculature.  This mass likely corresponds with the known adenocarcinoma.  This mass measures 3.7 x 3.3 x 3.1 cm in size.  The far lateral margin of the splenic vein may be occluded.  2. There is a 16 x 12 mm cystic lesion within the uncinate process of the pancreas.  This may represent IPMN or cystic mucinous neoplasm among other etiologies.  There are additional smaller cystic lesions within the pancreas.  Attention to these lesions  is suggested at the time of follow-up imaging.  3. There are hypodense nodules within the lateral margin of the left hepatic lobe measuring 8 and 9 mm respectively.  These are too small to accurately characterize.  Correlation with prior outside imaging is suggested for further assessment.  4. A trace amount of free fluid is seen within the pelvis.   LOCATION:  [id number]   Dictated by (CST): Stromberg, LeRoy, MD on 4/21/2025 at 4:46 PM     Finalized by (CST): Stromberg, LeRoy, MD on 4/21/2025 at 5:09 PM       CTA CHEST (CPT=71275)  Result Date: 4/17/2025  CONCLUSION:   1. No CT evidence of acute pulmonary embolism or acute aortic dissection.  2. Ectasia of the ascending aorta measuring up to 4.4 cm in diameter can be further assessed with gated CTA of the thoracic aorta as clinically directed.  3. Small bilateral pleural effusions.  Please see above for further details.   LOCATION:  [facility]   Dictated by (CST): Uriah Rodriguez MD on 4/17/2025 at 11:12 PM     Finalized by (CST): Uriah Rodriguez MD on 4/17/2025 at 11:15 PM       XR CHEST AP PORTABLE  (CPT=71045)  Result Date: 4/17/2025  CONCLUSION:  No active cardiopulmonary process identified.   LOCATION:  [facility]      Dictated by (CST): Uriah Rodriguez MD on 4/17/2025 at 10:24 PM     Finalized by (CST): Uriah Rodriguez MD on 4/17/2025 at 10:24 PM          Impression and Plan:    Problem List[6]    Impression  UGI bleeding  Anemia  Hx of pancreatic cancer, on chemotherapy  Getting EGD now    Plan  Hold chemotherapy for now  Hold anticoagulation for now  Iron panel  Transfuse for hemoglobin less than 7  I attempted to see him this morning, but he is at procedure.  We will follow closely.    Thank you for allowing me to participate in the care of your patient.    Greg Richardson MD  5/20/2025  8:21 AM       [1]   Past Medical History:   Actinic keratoses    Arrhythmia    AFib    Bladder stones    Colon polyp    DEPRESSION    Depression    Diabetes (HCC)    Enlarged prostate with urinary retention    Esophageal reflux    Morocho catheter in place    Gastritis    Gastro-esophageal reflux disease with esophagitis    Glucose intolerance    Hearing impaired person, bilateral    Hiatal hernia    High blood pressure    HYPERTRIGLYCERIDEMIA    Incontinence    Has a urinary cathether, can be incontinent of stool with urinary urgency    LAE (left atrial enlargement)    LAE (left atrial enlargement)    Left ventricular diastolic dysfunction with preserved systolic function    LVH (left ventricular hypertrophy)    Mild ascending aorta dilation    Osteoarthrosis, unspecified whether generalized or localized, unspecified site    Other and unspecified hyperlipidemia    Pancreas cancer (HCC)    Pancreas cyst (HCC)    bx    Pulmonary embolism (HCC)    Many years ago    RENAL DISEASE    RLS (restless legs syndrome)    Synovial cyst of lumbar facet joint     Unspecified essential hypertension    Vasovagal syncope    Visual impairment    glasses   [2]   Past Surgical History:  Procedure Laterality Date    Colonoscopy  06/22/2010    POLYP/5YRS, Dr Garcia    Egd and esd - internal  12/11/2017    Esophageal bx benign, pancreatic cyst aspirartion.  Dr Holland    Remove bladder stone,>2.5cm  02/20/2025    with TURP    Tonsillectomy      Transurethral elec-surg prostatectom  02/20/2025    with bladder stones laser litho    Upper gi endoscopy - referral  06/22/2010    Upper gi endoscopy,exam  02/10/2025   [3]   Family History  Problem Relation Age of Onset    Cancer Father         esophageal    Cancer Mother         breast   [4]   Allergies  Allergen Reactions    Pcn [Penicillins]      \" FAINTED\"   [5]   Current Facility-Administered Medications:     magnesium sulfate 4 g/100mL IVPB premix 4 g, 4 g, Intravenous, Once    potassium chloride 40 mEq/100mL IVPB premix (central line) 40 mEq, 40 mEq, Intravenous, Once    sodium chloride 0.9% infusion, , Intravenous, Once    amitriptyline (Elavil) tab 10 mg, 10 mg, Oral, QPM    dilTIAZem ER (Dilacor XR) 24 hr cap 120 mg, 120 mg, Oral, Daily    finasteride (Proscar) tab 5 mg, 5 mg, Oral, Daily    metoprolol tartrate (Lopressor) tab 100 mg, 100 mg, Oral, 2x Daily(Beta Blocker)    glucose (Dex4) 15 GM/59ML oral liquid 15 g, 15 g, Oral, Q15 Min PRN **OR** glucose (Glutose) 40% oral gel 15 g, 15 g, Oral, Q15 Min PRN **OR** glucose-vitamin C (Dex-4) chewable tab 4 tablet, 4 tablet, Oral, Q15 Min PRN **OR** dextrose 50% injection 50 mL, 50 mL, Intravenous, Q15 Min PRN **OR** glucose (Dex4) 15 GM/59ML oral liquid 30 g, 30 g, Oral, Q15 Min PRN **OR** glucose (Glutose) 40% oral gel 30 g, 30 g, Oral, Q15 Min PRN **OR** glucose-vitamin C (Dex-4) chewable tab 8 tablet, 8 tablet, Oral, Q15 Min PRN    sodium chloride 0.9% infusion, , Intravenous, Continuous    acetaminophen (Tylenol Extra Strength) tab 500 mg, 500 mg, Oral, Q4H PRN    ondansetron  (Zofran) 4 MG/2ML injection 4 mg, 4 mg, Intravenous, Q6H PRN    metoclopramide (Reglan) 5 mg/mL injection 5 mg, 5 mg, Intravenous, Q8H PRN    insulin aspart (NovoLOG) 100 Units/mL FlexPen 1-5 Units, 1-5 Units, Subcutaneous, TID AC and HS    pantoprazole (Protonix) 40 mg in sodium chloride 0.9% PF 10 mL IV push, 40 mg, Intravenous, Q24H  [6]   Patient Active Problem List  Diagnosis    Glucose intolerance (impaired glucose tolerance)    Pure hyperglyceridemia    Pure hypercholesterolemia    Esophageal reflux    Insomnia    Heart murmur    Thoracic ascending aortic aneurysm    LAE (left atrial enlargement)    Left ventricular diastolic dysfunction with preserved systolic function    Benign prostatic hyperplasia with lower urinary tract symptoms    Essential hypertension with goal blood pressure less than 130/80    Aortic valve sclerosis    History of pulmonary embolism    Non-rheumatic mitral regurgitation    Pancreatic cyst (HCC)    Mitral valve prolapse    Hypokalemia    Localized osteoarthritis of right shoulder    Pancreatic mass (HCC)    Gross hematuria    Atrial fibrillation with rapid ventricular response (HCC)    Diarrhea, unspecified type    Malignant neoplasm of pancreas, unspecified location of malignancy (HCC)    Gastrointestinal hemorrhage, unspecified gastrointestinal hemorrhage type    Anemia, unspecified type

## 2025-05-20 NOTE — PROGRESS NOTES
Progress Note  Shaq Song Patient Status:  Inpatient    10/2/1940 MRN [id number]   Location [facility] 4NW-A Attending Jeovany Tovar MD   Hosp Day # 1 PCP Alis Delgado MD     Subjective:  Feels \"great\". Moderate gastritis on endoscopy without active bleeding.  Eliquis held for now.  1 unit PRBCs transfusing currently    Objective:  Physical Exam:   BP (!) 167/79 (BP Location: Left arm)   Pulse 63   Temp 97.9 °F (36.6 °C) (Oral)   Resp 20   Ht 5' 7\" (1.702 m)   Wt 150 lb (68 kg)   SpO2 100%   BMI 23.49 kg/m²   Temp (24hrs), Av.8 °F (36.6 °C), Min:97 °F (36.1 °C), Max:98.2 °F (36.8 °C)       Intake/Output Summary (Last 24 hours) at 2025 1016  Last data filed at 2025 0920  Gross per 24 hour   Intake 5048.3 ml   Output 300 ml   Net 4748.3 ml     Wt Readings from Last 3 Encounters:   25 150 lb (68 kg)   25 153 lb 14.1 oz (69.8 kg)   25 162 lb (73.5 kg)     Telemetry: NSR 60 bpm  General: Alert and oriented in no apparent distress seated comfortably on the edge of the bed on room air.  HEENT: No focal deficits.  Neck: No JVD, carotids 2+ no bruits.  Cardiac: Regular rate and rhythm, S1, S2 normal, 2/6 systolic murmur, rub or gallop.  Lungs: Clear without wheezes, rales, rhonchi or dullness.  Normal excursions and effort.  Abdomen: Soft, non-tender.   Extremities: Without clubbing, cyanosis or edema.  Peripheral pulses are 2+.  Neurologic: Alert and oriented, normal affect.  Skin: Warm and dry. Port L chest wall.      Intake/Output:    Intake/Output Summary (Last 24 hours) at 2025 1015  Last data filed at 2025 0920  Gross per 24 hour   Intake 5048.3 ml   Output 300 ml   Net 4748.3 ml       Last 3 Weights   25 1416 150 lb (68 kg)   25 0518 153 lb 14.1 oz (69.8 kg)   25 0302 159 lb 2.8 oz (72.2 kg)   25 1014 162 lb (73.5 kg)       Labs:  Recent Labs   Lab 25  1435 25  0611   * 158*   BUN 16 12   CREATSERUM 1.16 1.02    EGFRCR 62 72   CA 8.8 8.3*    141   K 3.9 3.4*    109   CO2 24.0 25.0     Recent Labs   Lab 05/19/25  1435 05/20/25  0039 05/20/25  0613   RBC 2.42*  --  2.44*   HGB 7.3* 7.2* 7.1*   HCT 21.6*  --  21.9*   MCV 89.3  --  89.8   MCH 30.2  --  29.1   MCHC 33.8  --  32.4   RDW 16.8  --  16.3   NEPRELIM 2.12  --  0.89*   WBC 3.5*  --  1.9*   .0*  --  74.0*         Recent Labs   Lab 05/19/25  1435   TROPHS 106*       Diagnostics:   ECHOCARDIOGRAM 4/20/2025:  1. Left ventricle: The cavity size was normal. Wall thickness was normal.       Systolic function was normal. The estimated ejection fraction was       50-55%, by 3D assessment. Unable to assess LV diastolic function due to       heart rhythm.   2.  Left ventricle: There is hypokinesis of the basal-mid inferoseptal and       entire inferior walls.   3.  Right ventricle: Systolic function was normal.   4.  Left atrium: The left atrial volume was markedly increased.   5.  Right atrium: The atrium was moderately dilated.   6.  Aortic valve: The valve was trileaflet. The leaflets were mildly       calcified. There was mild regurgitation.   7.  Aortic root: The aortic root was 4.1cm diameter.   8.  Ascending aorta: The ascending aorta was 4.2cm diameter.   9.  Mitral valve: Prolapse, involving the posterior leaflet. There was       moderate regurgitation.   10. Pulmonary arteries: Systolic pressure was mildly increased, in the range       of 35mm Hg to 40mm Hg.   Impressions:  This study is compared with previous dated 12/19/2019: Wall   motion abnormality is new. Mitral regurgitation was noted previously.       Medications:  Scheduled Medications[1]  Medication Infusions[2]    Assessment/Plan:      Acute blood loss with pancreatic cancer   Presenting hgb 7.1  Endoscopy today, GI and oncology following  Off AC  NSTEMI April 2024  LVEF remained preserved 5/19, hypokinesis of basal-mid inferoseptal and inferior walls  Poor interventional candidate and  [facility] was deferred that admission given life threatening bleeding  Likely degree of CAD given age and eventual ischemic evaluation is warranted  Hx of pAF presenting in Afib RVR  Currently NSR 60's  BB and diltiazem  LVEF preserved   OAC held for now  Moderate to severe mitral regurgitation, mitral valve prolapse w/ partial flail P2 scallop -intervention delayed by pancreatic cancer diagnosis  Compensated  Ascending aorta dilation, 4.4 cm  Hypomagnesemia  Cardiac electrolyte protocol  Hypokalemia  Per cardiac electrolyte protocol  Pancreatic adenocarcinoma on chemotherapy  HTN  BP at goal  HLD  DM II  Hx of remote DVT; provoked  Hx of PE in March 25  Negative CTA chest 4/17/25        PLAN  Continue BB and diltiazem  OAC held currently. Consider resumption outpatient based on ongoing bleeding risk. Shaq is aware of the risk of CVA an agrees risk of bleeding outweighs benefit of OAC currently.  Outpatient follow up for MR, pAF, and CAD      Cardiology attending:  Pt seen and independently examined.  Note edited.  Plan of care performed by myself in its entirety.  Needs IV magnesium replacement, give 2 mg IVR, in addition to po dose given.  40 mg IV lasix, as has received 3 units prbc's.  Another 40 meq PO potassium at 2pm. Cardiac wise, ok for dc late today.  No eliquis on dc.      Boy Garcia MD  L3    Lucas Harris PA-C  5/20/2025  10:15 AM          [1]    sodium chloride   Intravenous Once    amitriptyline  10 mg Oral QPM    dilTIAZem ER  120 mg Oral Daily    finasteride  5 mg Oral Daily    metoprolol tartrate  100 mg Oral 2x Daily(Beta Blocker)    insulin aspart  1-5 Units Subcutaneous TID AC and HS    pantoprazole  40 mg Intravenous Q24H   [2]    lactated ringers      sodium chloride 83 mL/hr at 05/19/25 2129

## 2025-05-20 NOTE — DISCHARGE INSTRUCTIONS
Please follow up with oncologist at [facility] next Tuesday, if not please call Dr. Richardson office for an appointment this week to complete labs and see MD

## 2025-05-20 NOTE — PROGRESS NOTES
[facility] Hospitalist Progress Note                                                                     [facility]   [facility] of [facility]      hSaq Song  10/2/1940    SUBJECTIVE: No chest pain, palpitations, shortness of breath, cough, nausea, vomiting, abdominal pain. No further melena since admission.     OBJECTIVE:  Temp:  [97 °F (36.1 °C)-98.2 °F (36.8 °C)] 97.9 °F (36.6 °C)  Pulse:  [61-75] 64  Resp:  [10-20] 16  BP: (148-187)/(63-90) 158/75  SpO2:  [97 %-100 %] 99 %  Exam  Gen: No acute distress, alert and oriented   Pulm: Lungs clear bilaterally, normal respiratory effort no crackles, no wheezing  CV: Heart with regular rate and rhythm, no murmur.   Abd: Abdomen soft, nontender, nondistended, bowel sounds present  MSK: No significant pitting edema or tenderness of the LE  Skin: no new rashes or lesions    Labs:   Recent Labs   Lab 05/19/25  1435 05/20/25  0039 05/20/25  0613   WBC 3.5*  --  1.9*   HGB 7.3* 7.2* 7.1*   MCV 89.3  --  89.8   .0*  --  74.0*       Recent Labs   Lab 05/19/25  1435 05/20/25  0611    141   K 3.9 3.4*    109   CO2 24.0 25.0   BUN 16 12   CREATSERUM 1.16 1.02   CA 8.8 8.3*   MG  --  1.3*   * 158*       Recent Labs   Lab 05/19/25  1435   ALT 10   AST 15   ALB 3.9       Recent Labs   Lab 05/19/25  2138 05/20/25  0556   PGLU 193* 170*       Meds:   Scheduled: Scheduled Medications[1]  Continuous Infusions: Medication Infusions[2]  PRN: PRN Medications[3]    ASSESSMENT / PLAN:   84 year old male with history of atrial fibrillation, bladder stones, depression, type 2 diabetes, enlarged prostate with urinary retention history, GERD, hypertension, osteoarthritis, history of pancreatic cancer with active chemo, history of PE, history of restless leg syndrome presenting with black stool.     Melena  -hx of GI bleed recently with cauterizing and clipping  -NPO  -IV PPI  -1 unit prbc 5/19  -GI following --> EGD  today   -check hgb q 8 hrs  -transfuse if hgb < 7  -hold AC    Acute blood loss anemia  -pt received 1 unit prb 5/19  -hgb remained in the low 7  -will transfuse 1 more unit prbc given hx of GI bleed pos troponin on admission  -monitor hgb trend  -GI eval as above  -hold AC     Positive troponin  -no chest pain   -may be demand ischemia from anemia  -cardiology following     Atrial fibrillation hx  -hold AC  -diltiazem  -metoprolol      Hx Pancreatic Ca  -on active chemo, 5/19 was suppose to get a dose  -outpt oncology follow up     Hx PE  Hx DVT  -resume AC when ok with GI  -not recent  -outpt follow up with Heme to determine tx plan if needed given cancer hx     Type 2 DM  -hold home oral meds  -low dose insulin sliding scale     HTN  -sbp stable  -diltiazem  -metoprolol      Quality:  DVT Prophylaxis: scd  CODE status:   Morocho: none     Plan of care discussed with patient and staff     Dispo: no discharge     Jeovany Tovar MD  Count includes the [facility] Hospitalist  [phone]           [1]    magnesium sulfate  4 g Intravenous Once    potassium chloride  40 mEq Intravenous Once    amitriptyline  10 mg Oral QPM    dilTIAZem ER  120 mg Oral Daily    finasteride  5 mg Oral Daily    metoprolol tartrate  100 mg Oral 2x Daily(Beta Blocker)    insulin aspart  1-5 Units Subcutaneous TID AC and HS    pantoprazole  40 mg Intravenous Q24H   [2]    sodium chloride 83 mL/hr at 05/19/25 2129   [3]   glucose **OR** glucose **OR** glucose-vitamin C **OR** dextrose **OR** glucose **OR** glucose **OR** glucose-vitamin C    acetaminophen    ondansetron    metoclopramide

## 2025-05-20 NOTE — PLAN OF CARE
Patient is alert and oriented, vitals stable, no fever overnight. Hgb stable, no signs of bleeding noted. Patient denies pain or SOB. NPO, IVF infusing. Safety precautions in place, call light within reach, plan of care ongoing.      Problem: Diabetes/Glucose Control  Goal: Glucose maintained within prescribed range  Description: INTERVENTIONS:  - Monitor Blood Glucose as ordered  - Assess for signs and symptoms of hyperglycemia and hypoglycemia  - Administer ordered medications to maintain glucose within target range  - Assess barriers to adequate nutritional intake and initiate nutrition consult as needed  - Instruct patient on self management of diabetes  Outcome: Progressing     Problem: GASTROINTESTINAL - ADULT  Goal: Maintains or returns to baseline bowel function  Description: INTERVENTIONS:  - Assess bowel function  - Maintain adequate hydration with IV or PO as ordered and tolerated  - Evaluate effectiveness of GI medications  - Encourage mobilization and activity  - Obtain nutritional consult as needed  - Establish a toileting routine/schedule  - Consider collaborating with pharmacy to review patient's medication profile  Outcome: Progressing

## 2025-05-20 NOTE — OPERATIVE REPORT
EGD Operative Report    Shaq Song Patient Status:  Inpatient    10/2/1940 MRN [id number]   [facility] ENDOSCOPY [facility]IN CENTER Attending Jeovany Tovar MD   Hosp Day #   1 PCP Alis Delgado MD       Pre-op Diagnosis: GI BLEED     Post-Op Diagnosis:    ESOPHAGUS:  Normal esophagus   STOMACH:  Moderate localized erosive gastritis characterized by erythema and edema in gastric fundus. Two previously placed endoclips seen. No active GI bleed. No ulcers. No evidence of AVM's    DUODENUM:  Normal duodenum     Procedure Performed: EGD     Informed Consent: Informed consent for both the procedure and sedation were obtained from the patient. The potentially life-threatening complications of sedation, bleeding,  Perforation, transfusion or repeat endoscopy were reviewed along with the possible need for hospitalization, surgical management, transfusion or repeat endoscopy should one of these complications arise. The patient understands and is agreeable to proceed.  Sedation Type: MAC-Patient received sedation with monitored anesthesia provided by an anesthesiologist  Moderate Sedation Time: None.  Deep sedation provided by anesthesia.  Procedure Description: The patient was placed in the left lateral decubitus position.  A bite block was placed in the patient’s mouth.  The endoscope was inserted through the mouth and advanced under direct visualization to the 3rd portion of the duodenum and was then withdrawn to examine the duodenal bulb and gastric antrum.  The endoscope was then retroflexed to examine the angulus, GE junction, cardia, body and fundus and then withdrawn to examine the esophagus. The endoscope was then removed from the patient. The patient tolerated the procedure well with no immediate complications and was transferred to the recovery area in  stable condition.  Findings:    ESOPHAGUS:  Normal esophagus   STOMACH:  Moderate localized erosive gastritis characterized by erythema and edema in gastric fundus. Two previously placed endoclips seen. No active GI bleed. No ulcers. No evidence of AVM's    DUODENUM:  Normal duodenum   Recommendations:   CBC daily   Avoid NSAID's  Hold Eliquis for now   Resume previous diet   Resume eliquis from tomorrow   F/u with PCP in 2-3 weeks   No GI follow-up needed     Pantoprazole 40 mg PO BID for 8 weeks and then 40 mg daily for another 8 weeks   Discharge:  The patient was given an after visit summary detailing the procedure, findings, recommendations and follow up plans.  Robby Lucia DO  5/20/2025  8:59 AM

## 2025-05-20 NOTE — ANESTHESIA POSTPROCEDURE EVALUATION
[facility]    Shaq Song Patient Status:  Inpatient   Age/Gender 84 year old male MRN [id number]   Location [facility] ENDOSCOPY PAIN CENTER Attending Jeovnay Tovar MD   Hosp Day # 1 PCP Alis Delgado MD       Anesthesia Post-op Note    ESOPHAGOGASTRODUODENOSCOPY (EGD)    Procedure Summary       Date: 05/20/25 Room / Location:  ENDOSCOPY 03 /  ENDOSCOPY    Anesthesia Start: 0842 Anesthesia Stop:     Procedure: ESOPHAGOGASTRODUODENOSCOPY (EGD) Diagnosis: (gastritis)    Surgeons: Robby Lucia DO Anesthesiologist: Lamont Mccann MD    Anesthesia Type: MAC ASA Status: 2            Anesthesia Type: MAC    Vitals Value Taken Time   /65 05/20/25 08:51   Temp 98 05/20/25 08:51   Pulse 61 05/20/25 08:51   Resp 12 05/20/25 08:51   SpO2 97 05/20/25 08:51           Patient Location: Endoscopy    Anesthesia Type: MAC    Airway Patency: patent    Postop Pain Control: adequate    Mental Status: preanesthetic baseline    Cardiopulmonary/Hydration status: stable euvolemic    Complications: no apparent anesthesia related complications    Postop vital signs: stable    Dental Exam: Unchanged from Preop    Patient to be discharged from PACU when criteria met.

## 2025-05-20 NOTE — PLAN OF CARE
Patient is alert and orientated, VSS, RA, afebrile and does not complain of pain. Patient NPO this am for EGD. Patient clear for soft diet. Received 1 unit PRBC's. Hgb 9.5. Clear for discharge. All meds given per MAR. Patient ambulatory. Mag and K+ replaced. IV lasix given. Call light and safety precautions in place.   Plan for discharge.    Problem: Diabetes/Glucose Control  Goal: Glucose maintained within prescribed range  Description: INTERVENTIONS:  - Monitor Blood Glucose as ordered  - Assess for signs and symptoms of hyperglycemia and hypoglycemia  - Administer ordered medications to maintain glucose within target range  - Assess barriers to adequate nutritional intake and initiate nutrition consult as needed  - Instruct patient on self management of diabetes  Outcome: Progressing     Problem: Patient/Family Goals  Goal: Patient/Family Long Term Goal  Description: Patient's Long Term Goal:     Interventions:  - See additional Care Plan goals for specific interventions  Outcome: Progressing  Goal: Patient/Family Short Term Goal  Description: Patient's Short Term Goal:     Interventions:   - See additional Care Plan goals for specific interventions  Outcome: Progressing     Problem: GASTROINTESTINAL - ADULT  Goal: Maintains or returns to baseline bowel function  Description: INTERVENTIONS:  - Assess bowel function  - Maintain adequate hydration with IV or PO as ordered and tolerated  - Evaluate effectiveness of GI medications  - Encourage mobilization and activity  - Obtain nutritional consult as needed  - Establish a toileting routine/schedule  - Consider collaborating with pharmacy to review patient's medication profile  Outcome: Progressing

## 2025-05-20 NOTE — CONSULTS
[facility] IP Report of Consultation Gastroenterology    5/20/2025    Shaq Song  male   Robby Lucia DO     [id number]  10/2/1940 Primary Care Physician  Alis Delgado MD     Reason for Consultation: GI bleed     HPI: Patient is 84-year-old male comes to the hospital with black tarry stools. Has history of GI bleed that was found on EGD. 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 About a month ago he had an EGD and had his cauterized and clipped. He was told he was anemic. He denies any headache symptoms. No chest pain or trouble breathing. Denies any abdominal pains. No nausea or vomiting. Stable in ED.     PROBLEM LIST:   Problem List[1]    PATIENT HISTORY:     Past Medical History[2]   Past Surgical History[3]   Family History[4]   Short Social Hx on File[5]     Allergies;  Allergies[6]     Medications:  Current Hospital Medications[7]     REVIEW OF SYSTEMS:   GENERAL: well developed, well nourished, in no apparent distress  HEENT: normocephalic; normal nose, pharynx and TM's  EYES: PERRLA, EOMI, sclera anicteric, conjunctiva normal; fundi normal  NECK: supple, FROM, no nodes, no JVD, no thyromegaly, no carotid bruits  RESPIRATORY: clear to percussion and auscultation  CARDIOVASCULAR: S1, S2 normal, RRR; no S3, no S4; no click; murmur negative  ABDOMEN: normal, active bowel sounds, no masses, HSM or tenderness  RECTAL: ---  EXTREMITIES: no cyanosis, clubbing or edema, peripheral pulses intact  PSYCHIATRIC: alert, oriented times 3        EXAM:   /75   Pulse 64   Temp 98.1 °F (36.7 °C) (Oral)   Resp 16   Ht 5' 7\" (1.702 m)   Wt 150 lb (68 kg)   SpO2 99%   BMI 23.49 kg/m²  Body mass index is 23.49 kg/m².  GENERAL: Well developed, well nourished, in no obvious distress.   ABDOMEN: Bowel sounds normoactive. Soft, no organomegaly or masses appreciated. Nontender.   EXTREMITIES: Without cyanosis. No peripheral edema  appreciated.   RECTAL: Deferred.   NEURO: Motor and Gait grossly intact. Alert and Oriented x 3.        LAB/IMAGING RESULTS:     Lab Results   Component Value Date    WBC 3.5 05/19/2025    HGB 7.2 05/20/2025    HCT 21.6 05/19/2025    .0 05/19/2025     [unfilled]  Lab Results   Component Value Date    WBC 3.5 05/19/2025    HGB 7.2 05/20/2025    HCT 21.6 05/19/2025    .0 05/19/2025    CREATSERUM 1.16 05/19/2025    BUN 16 05/19/2025     05/19/2025    K 3.9 05/19/2025     05/19/2025    CO2 24.0 05/19/2025     05/19/2025    CA 8.8 05/19/2025    ALB 3.9 05/19/2025    ALKPHO 47 05/19/2025    BILT 0.6 05/19/2025    TP 6.1 05/19/2025    AST 15 05/19/2025    ALT 10 05/19/2025    PGLU 170 05/20/2025         ASSESSMENT AND PLAN:     Upper GI bleed   2. Acute blood loss anemia       EGD 4/18/2025   ESOPHAGUS:  normal  STOMACH:  In gastric fundus and proximal body there is bleeding from avm vs neovascularization arising from what is concerning for extrinsic tumor eroding into stomach. This was injected with 3 ml epinephrine and apc. Continued bleeding and two clips placed. Continue bleeding and hemospray x 2 used. No bleeding at end of procedure.  DUODENUM:  normal, bile    PLAN:  - Patient was seen and examined in ED. Hemodynamically stable. Clear liquids today. NPO after midnight for EGD tomorrow under MAC   - PPI 40 mg BID for now   - Avoid NSAID's, antiplatelets and anticoagulation     The patient indicates understanding of these issues and agrees to the plan.  Robby Lucia DO  5/20/2025  6:01 AM         [1]   Patient Active Problem List  Diagnosis    Glucose intolerance (impaired glucose tolerance)    Pure hyperglyceridemia    Pure hypercholesterolemia    Esophageal reflux    Insomnia    Heart murmur    Thoracic ascending aortic aneurysm    LAE (left atrial enlargement)    Left ventricular diastolic dysfunction with preserved systolic function    Benign prostatic hyperplasia with lower  urinary tract symptoms    Essential hypertension with goal blood pressure less than 130/80    Aortic valve sclerosis    History of pulmonary embolism    Non-rheumatic mitral regurgitation    Pancreatic cyst (HCC)    Mitral valve prolapse    Hypokalemia    Localized osteoarthritis of right shoulder    Pancreatic mass (HCC)    Gross hematuria    Atrial fibrillation with rapid ventricular response (HCC)    Diarrhea, unspecified type    Malignant neoplasm of pancreas, unspecified location of malignancy (HCC)    Gastrointestinal hemorrhage, unspecified gastrointestinal hemorrhage type    Anemia, unspecified type   [2]   Past Medical History:   Actinic keratoses    Arrhythmia    AFib    Bladder stones    Colon polyp    DEPRESSION    Depression    Diabetes (HCC)    Enlarged prostate with urinary retention    Esophageal reflux    Morocho catheter in place    Gastritis    Gastro-esophageal reflux disease with esophagitis    Glucose intolerance    Hearing impaired person, bilateral    Hiatal hernia    High blood pressure    HYPERTRIGLYCERIDEMIA    Incontinence    Has a urinary cathether, can be incontinent of stool with urinary urgency    LAE (left atrial enlargement)    LAE (left atrial enlargement)    Left ventricular diastolic dysfunction with preserved systolic function    LVH (left ventricular hypertrophy)    Mild ascending aorta dilation    Osteoarthrosis, unspecified whether generalized or localized, unspecified site    Other and unspecified hyperlipidemia    Pancreas cancer (HCC)    Pancreas cyst (HCC)    bx    Pulmonary embolism (HCC)    Many years ago    RENAL DISEASE    RLS (restless legs syndrome)    Synovial cyst of lumbar facet joint    Unspecified essential hypertension    Vasovagal syncope    Visual impairment    glasses   [3]   Past Surgical History:  Procedure Laterality Date    Colonoscopy  06/22/2010    POLYP/5YRS, Dr Garcia    Egd and esd - internal  12/11/2017    Esophageal bx benign, pancreatic cyst  aspirartion.  Dr Holland    Remove bladder stone,>2.5cm  2025    with TURP    Tonsillectomy      Transurethral elec-surg prostatectom  2025    with bladder stones laser litho    Upper gi endoscopy - referral  2010    Upper gi endoscopy,exam  02/10/2025   [4]   Family History  Problem Relation Age of Onset    Cancer Father         esophageal    Cancer Mother         breast   [5]   Social History  Socioeconomic History    Marital status:    Tobacco Use    Smoking status: Former     Current packs/day: 0.00     Types: Cigarettes     Quit date: 1958     Years since quittin.4    Smokeless tobacco: Never   Vaping Use    Vaping status: Never Used   Substance and Sexual Activity    Alcohol use: Not Currently     Comment: 2-3/MONTH    Drug use: No    Sexual activity: Yes     Comment:      Social Drivers of Health     Food Insecurity: No Food Insecurity (2025)    NCSS - Food Insecurity     Worried About Running Out of Food in the Last Year: No     Ran Out of Food in the Last Year: No   Transportation Needs: No Transportation Needs (2025)    NCSS - Transportation     Lack of Transportation: No   Housing Stability: Not At Risk (2025)    NCSS - Housing/Utilities     Has Housing: Yes     Worried About Losing Housing: No     Unable to Get Utilities: No   [6]   Allergies  Allergen Reactions    Pcn [Penicillins]      \" FAINTED\"   [7]   Current Facility-Administered Medications:     amitriptyline (Elavil) tab 10 mg, 10 mg, Oral, QPM    dilTIAZem ER (Dilacor XR) 24 hr cap 120 mg, 120 mg, Oral, Daily    finasteride (Proscar) tab 5 mg, 5 mg, Oral, Daily    metoprolol tartrate (Lopressor) tab 100 mg, 100 mg, Oral, 2x Daily(Beta Blocker)    glucose (Dex4) 15 GM/59ML oral liquid 15 g, 15 g, Oral, Q15 Min PRN **OR** glucose (Glutose) 40% oral gel 15 g, 15 g, Oral, Q15 Min PRN **OR** glucose-vitamin C (Dex-4) chewable tab 4 tablet, 4 tablet, Oral, Q15 Min PRN **OR** dextrose 50%  injection 50 mL, 50 mL, Intravenous, Q15 Min PRN **OR** glucose (Dex4) 15 GM/59ML oral liquid 30 g, 30 g, Oral, Q15 Min PRN **OR** glucose (Glutose) 40% oral gel 30 g, 30 g, Oral, Q15 Min PRN **OR** glucose-vitamin C (Dex-4) chewable tab 8 tablet, 8 tablet, Oral, Q15 Min PRN    sodium chloride 0.9% infusion, , Intravenous, Continuous    acetaminophen (Tylenol Extra Strength) tab 500 mg, 500 mg, Oral, Q4H PRN    ondansetron (Zofran) 4 MG/2ML injection 4 mg, 4 mg, Intravenous, Q6H PRN    metoclopramide (Reglan) 5 mg/mL injection 5 mg, 5 mg, Intravenous, Q8H PRN    insulin aspart (NovoLOG) 100 Units/mL FlexPen 1-5 Units, 1-5 Units, Subcutaneous, TID AC and HS    pantoprazole (Protonix) 40 mg in sodium chloride 0.9% PF 10 mL IV push, 40 mg, Intravenous, Q24H

## 2025-05-20 NOTE — ANESTHESIA PREPROCEDURE EVALUATION
PRE-OP EVALUATION    Patient Name: Shaq Song    Admit Diagnosis: Malignant neoplasm of pancreas, unspecified location of malignancy (HCC) [C25.9]  Gastrointestinal hemorrhage, unspecified gastrointestinal hemorrhage type [K92.2]  Anemia, unspecified type [D64.9]    Pre-op Diagnosis: GI BLEED    ESOPHAGOGASTRODUODENOSCOPY (EGD)    Anesthesia Procedure: ESOPHAGOGASTRODUODENOSCOPY (EGD)    Surgeons and Role:     * Robby Lucia, DO - Primary    Pre-op vitals reviewed.  Temp: 97.9 °F (36.6 °C)  Pulse: 64  Resp: 16  BP: 158/75  SpO2: 99 %  Body mass index is 23.49 kg/m².    Current medications reviewed.  Hospital Medications:  Current Medications[1]    Outpatient Medications:   Prescriptions Prior to Admission[2]    Allergies: Pcn [penicillins]      Anesthesia Evaluation        Anesthetic Complications  (-) history of anesthetic complications         GI/Hepatic/Renal      (+) GERD and well controlled      (-) chronic renal disease   (-) liver disease                 Cardiovascular        Exercise tolerance: good     MET: >4      (+) hypertension                                     Endo/Other      (+) diabetes   not using insulin                         Pulmonary      (-) asthma  (-) COPD            (-) sleep apnea       Neuro/Psych          (-) CVA     (-) seizures                       Past Surgical History[3]  Social Hx on file[4]  History   Drug Use No     Available pre-op labs reviewed.  Lab Results   Component Value Date    WBC 1.9 (L) 05/20/2025    RBC 2.44 (L) 05/20/2025    HGB 7.1 (L) 05/20/2025    HCT 21.9 (L) 05/20/2025    MCV 89.8 05/20/2025    MCH 29.1 05/20/2025    MCHC 32.4 05/20/2025    RDW 16.3 05/20/2025    PLT 74.0 (L) 05/20/2025     Lab Results   Component Value Date     05/20/2025    K 3.4 (L) 05/20/2025     05/20/2025    CO2 25.0 05/20/2025    BUN 12 05/20/2025    CREATSERUM 1.02 05/20/2025     (H) 05/20/2025    CA 8.3 (L) 05/20/2025            Airway      Mallampati:  III  Mouth opening: 3 FB  TM distance: < 4 cm  Neck ROM: full Cardiovascular    Cardiovascular exam normal.         Dental    Dentition appears grossly intact         Pulmonary    Pulmonary exam normal.                 Other findings              ASA: 2   Plan: MAC  NPO status verified and patient meets guidelines.  Patient has taken beta blockers in last 24 hours.  Post-procedure pain management plan discussed with surgeon and patient.      Plan/risks discussed with: patient                Present on Admission:  **None**             [1]    magnesium sulfate 4 g/100mL IVPB premix 4 g  4 g Intravenous Once    potassium chloride 40 mEq/100mL IVPB premix (central line) 40 mEq  40 mEq Intravenous Once    sodium chloride 0.9% infusion   Intravenous Once    [COMPLETED] pantoprazole (Protonix) 80 mg in sodium chloride 0.9% 100 mL IV bolus  80 mg Intravenous Once    amitriptyline (Elavil) tab 10 mg  10 mg Oral QPM    dilTIAZem ER (Dilacor XR) 24 hr cap 120 mg  120 mg Oral Daily    finasteride (Proscar) tab 5 mg  5 mg Oral Daily    metoprolol tartrate (Lopressor) tab 100 mg  100 mg Oral 2x Daily(Beta Blocker)    glucose (Dex4) 15 GM/59ML oral liquid 15 g  15 g Oral Q15 Min PRN    Or    glucose (Glutose) 40% oral gel 15 g  15 g Oral Q15 Min PRN    Or    glucose-vitamin C (Dex-4) chewable tab 4 tablet  4 tablet Oral Q15 Min PRN    Or    dextrose 50% injection 50 mL  50 mL Intravenous Q15 Min PRN    Or    glucose (Dex4) 15 GM/59ML oral liquid 30 g  30 g Oral Q15 Min PRN    Or    glucose (Glutose) 40% oral gel 30 g  30 g Oral Q15 Min PRN    Or    glucose-vitamin C (Dex-4) chewable tab 8 tablet  8 tablet Oral Q15 Min PRN    sodium chloride 0.9% infusion   Intravenous Continuous    acetaminophen (Tylenol Extra Strength) tab 500 mg  500 mg Oral Q4H PRN    ondansetron (Zofran) 4 MG/2ML injection 4 mg  4 mg Intravenous Q6H PRN    metoclopramide (Reglan) 5 mg/mL injection 5 mg  5 mg Intravenous Q8H PRN    insulin aspart (NovoLOG) 100  Units/mL FlexPen 1-5 Units  1-5 Units Subcutaneous TID AC and HS    pantoprazole (Protonix) 40 mg in sodium chloride 0.9% PF 10 mL IV push  40 mg Intravenous Q24H   [2]   Medications Prior to Admission   Medication Sig Dispense Refill Last Dose/Taking    metoprolol tartrate 100 MG Oral Tab Take 1 tablet (100 mg total) by mouth 2x Daily(Beta Blocker). 60 tablet 2 5/19/2025    dilTIAZem  MG Oral Capsule SR 24 Hr Take 1 capsule (120 mg total) by mouth daily. 90 capsule 0 5/19/2025    pantoprazole 40 MG Oral Tab EC Take 1 tablet (40 mg total) by mouth 2 (two) times daily before meals. 180 tablet 3 5/19/2025    ELIQUIS 5 MG Oral Tab Take 1 tablet (5 mg total) by mouth 2 (two) times daily.   5/19/2025    ondansetron (ZOFRAN) 8 MG tablet Take 1 tablet (8 mg total) by mouth every 8 (eight) hours as needed for Nausea.   Taking As Needed    finasteride 5 MG Oral Tab Take 1 tablet (5 mg total) by mouth in the morning.   5/19/2025    metFORMIN 500 MG Oral Tab Take 2 tablets (1,000 mg total) by mouth daily with breakfast.   5/19/2025    amitriptyline 10 MG Oral Tab Take 1 tablet (10 mg total) by mouth every evening. 90 tablet 3 5/18/2025    Cholecalciferol (VITAMIN D3 OR) Take 1 tablet by mouth in the morning.   5/19/2025    MULTIVITAMINS OR TABS Take 1 tablet by mouth in the morning.   5/19/2025    furosemide 20 MG Oral Tab Take 1 tablet (20 mg total) by mouth daily. (Patient not taking: Reported on 5/19/2025)   Not Taking   [3]   Past Surgical History:  Procedure Laterality Date    Colonoscopy  06/22/2010    POLYP/5YRS, Dr Garcia    Egd and esd - internal  12/11/2017    Esophageal bx benign, pancreatic cyst aspirartion.  Dr Holland    Remove bladder stone,>2.5cm  02/20/2025    with TURP    Tonsillectomy      Transurethral elec-surg prostatectom  02/20/2025    with bladder stones laser litho    Upper gi endoscopy - referral  06/22/2010    Upper gi endoscopy,exam  02/10/2025   [4]   Social History  Socioeconomic History     Marital status:    Tobacco Use    Smoking status: Former     Current packs/day: 0.00     Types: Cigarettes     Quit date: 1958     Years since quittin.4    Smokeless tobacco: Never   Vaping Use    Vaping status: Never Used   Substance and Sexual Activity    Alcohol use: Not Currently     Comment: 2-3/MONTH    Drug use: No    Sexual activity: Yes     Comment:

## 2025-05-20 NOTE — ANESTHESIA POSTPROCEDURE EVALUATION
[facility]    Shaq Song Patient Status:  Inpatient   Age/Gender 84 year old male MRN [id number]   Location [facility] Attending Jeovany Tovar MD   Hosp Day # 1 PCP Alis Delgado MD       Anesthesia Post-op Note    ESOPHAGOGASTRODUODENOSCOPY (EGD)    Procedure Summary       Date: 05/20/25 Room / Location:  ENDOSCOPY 03 /  ENDOSCOPY    Anesthesia Start: 0842 Anesthesia Stop: 0852    Procedure: ESOPHAGOGASTRODUODENOSCOPY (EGD) Diagnosis: (gastritis)    Surgeons: Robby Lucia DO Anesthesiologist: Lamont Mccann MD    Anesthesia Type: MAC ASA Status: 2            Anesthesia Type: MAC    Vitals Value Taken Time   /67 05/20/25 08:57   Temp 98 05/20/25 09:01   Pulse 60 05/20/25 09:00   Resp 24 05/20/25 09:00   SpO2 100 % 05/20/25 09:00   Vitals shown include unfiled device data.        Patient Location: Endoscopy    Anesthesia Type: MAC    Airway Patency: patent    Postop Pain Control: adequate    Mental Status: preanesthetic baseline    Cardiopulmonary/Hydration status: stable euvolemic    Complications: no apparent anesthesia related complications    Postop vital signs: stable    Dental Exam: Unchanged from Preop    Patient to be discharged from PACU when criteria met.

## 2025-05-21 LAB
BLOOD TYPE BARCODE: 6200
BLOOD TYPE BARCODE: 6200
UNIT VOLUME: 350 ML
UNIT VOLUME: 350 ML

## 2025-05-26 NOTE — DISCHARGE SUMMARY
[facility]    DISCHARGE SUMMARY     Shaq Song Patient Status:  Inpatient    10/2/1940 MRN [id number]   Location [facility] 4NW-A Attending No att. providers found   Hosp Day # 1 PCP Alis Delgado MD     Date of Admission: 2025  Date of Discharge: 2025  Discharge Disposition: Home or Self Care    Discharge Diagnosis: Erosive gastritic, acute blood loss anemia    History of Present Illness: 84 year old male with history of atrial fibrillation, bladder stones, depression, type 2 diabetes, enlarged prostate with urinary retention history, GERD, hypertension, osteoarthritis, history of pancreatic cancer with active chemo, history of PE, history of restless leg syndrome presenting with black stool.  Patient seen in consultation with hematology, cardiology, gastroenterology.  Patient underwent EGD that showed erosive gastritis.  Recommendation for twice daily PPI followed by daily PPI thereafterwards.  Patient's anticoagulation was held on admission and to be resumed the day after discharge per GI.  Patient did have acute blood loss anemia.  Patient received 2 units of packed blood cells while hospitalized.  No further GI bleeding seen hemoglobin remained above 7 prior to discharge.  Patient was seen by hematology.  Chemotherapy held while in hospital.  Patient to follow-up with hematology oncology as an outpatient to discuss resuming chemotherapy.  Patient did have elevated troponin.  No chest pain.  Likely demand ischemia from anemia.  Cardiology consulted.  No further inpatient evaluation needed.  Per cardiology given patient's GI bleed hold Eliquis on discharge.  Patient otherwise clinically stable, vital stable, labs stable for discharge.  Patient will discharge.  All consultants will discharge.      Discharge Medication List:     Discharge Medications        CONTINUE taking these medications        Instructions Prescription details   amitriptyline 10 MG  Tabs  Commonly known as: Elavil      Take 1 tablet (10 mg total) by mouth every evening.   Quantity: 90 tablet  Refills: 3     dilTIAZem  MG Cp24  Commonly known as: Cardizem CD      Take 1 capsule (120 mg total) by mouth daily.   Quantity: 90 capsule  Refills: 0     finasteride 5 MG Tabs  Commonly known as: Proscar      Take 1 tablet (5 mg total) by mouth in the morning.   Refills: 0     metFORMIN 500 MG Tabs  Commonly known as: Glucophage      Take 2 tablets (1,000 mg total) by mouth daily with breakfast.   Refills: 0     metoprolol tartrate 100 MG Tabs  Commonly known as: Lopressor      Take 1 tablet (100 mg total) by mouth 2x Daily(Beta Blocker).   Quantity: 60 tablet  Refills: 2     multivitamin Tabs      Take 1 tablet by mouth in the morning.   Refills: 0     ondansetron 8 MG tablet  Commonly known as: Zofran      Take 1 tablet (8 mg total) by mouth every 8 (eight) hours as needed for Nausea.   Refills: 0     pantoprazole 40 MG Tbec  Commonly known as: Protonix      Take 1 tablet (40 mg total) by mouth 2 (two) times daily before meals.   Quantity: 180 tablet  Refills: 3     VITAMIN D3 OR      Take 1 tablet by mouth in the morning.   Refills: 0            STOP taking these medications      Eliquis 5 MG Tabs  Generic drug: apixaban        furosemide 20 MG Tabs  Commonly known as: Lasix               Follow-up appointment:   Teo Acosta DO  [address] [facility] [address]  [facility] [address] [phone]    Follow up on 6/3/2025  @ 10:40    Alis Delgado MD  [address] [place] [address] [phone]    Follow up in 3 day(s)      Greg Richardson MD  [address]  [address]  [facility] [address] [phone]    Follow up in 1 week(s)  Follow up    Robby Lucia DO  [address] [facility] [facility] [address]  [address]  [facility] [address] [phone]    Follow up      Appointments for Next 30 Days 5/26/2025 - 6/25/2025      None            OBJECTIVE:  Temp:  [97 °F (36.1 °C)-98.2 °F (36.8 °C)] 97.9 °F (36.6 °C)  Pulse:   [61-75] 64  Resp:  [10-20] 16  BP: (148-187)/(63-90) 158/75  SpO2:  [97 %-100 %] 99 %  Exam  Gen: No acute distress, alert and oriented   Pulm: Lungs clear bilaterally, normal respiratory effort no crackles, no wheezing  CV: Heart with regular rate and rhythm, no murmur.   Abd: Abdomen soft, nontender, nondistended, bowel sounds present  MSK: No significant pitting edema or tenderness of the LE  Skin: no new rashes or lesions  -----------------------------------------------------------------------------------------------  PATIENT DISCHARGE INSTRUCTIONS: See electronic chart    ASSESSMENT / PLAN:   84 year old male with history of atrial fibrillation, bladder stones, depression, type 2 diabetes, enlarged prostate with urinary retention history, GERD, hypertension, osteoarthritis, history of pancreatic cancer with active chemo, history of PE, history of restless leg syndrome presenting with black stool.     Melena  -hx of GI bleed recently with cauterizing and clipping  -NPO--> resume diet   -IV PPI--> po on dc  -1 unit prbc 5/19  -GI following --> EGD today --> erosive gastritis, --> BID ppi fro 1 month followed by daily PPI for 8 weeks  -check hgb q 8 hrs  -transfuse if hgb < 7  -hold AC     Acute blood loss anemia  -pt received 1 unit prb 5/19  -hgb remained in the low 7  -will transfuse 1 more unit prbc given hx of GI bleed pos troponin on admission  -monitor hgb trend  -GI eval as above  -hold AC     Positive troponin  -no chest pain   -may be demand ischemia from anemia  -cardiology following--> no further inpt eval, ok for dc     Atrial fibrillation hx  -hold AC, hold on dc per cardiology   -diltiazem  -metoprolol      Hx Pancreatic Ca  -on active chemo, 5/19 was suppose to get a dose  -outpt oncology follow up     Hx PE  Hx DVT  -resume AC when ok with GI--> hold AC due to GI bleed per cardiology   -not recent  -outpt follow up with Heme to determine tx plan if needed given cancer hx     Type 2 DM  -hold home  oral meds, resume on dc  -low dose insulin sliding scale     HTN  -sbp stable  -diltiazem  -metoprolol      Plan of care discussed with patient and staff     Dispo:  discharge     MD Tricia Aparicio Hospitalist  [phone]    Time spent:  > 35 minutes

## 2025-05-27 NOTE — PROGRESS NOTES
PHYSICIAN CLARIFICATION    Additional information related to patient's GI bleeding and Eliquis use.    GI bleeding probably related to Eliquis use     This note is part of the patient's medical record.

## 2025-06-10 ENCOUNTER — LAB ENCOUNTER (OUTPATIENT)
Dept: LAB | Facility: HOSPITAL | Age: 85
End: 2025-06-10
Attending: STUDENT IN AN ORGANIZED HEALTH CARE EDUCATION/TRAINING PROGRAM
Payer: MEDICARE

## 2025-06-10 DIAGNOSIS — I10 ESSENTIAL HYPERTENSION WITH GOAL BLOOD PRESSURE LESS THAN 130/80: Primary | ICD-10-CM

## 2025-06-10 DIAGNOSIS — I34.0 NON-RHEUMATIC MITRAL REGURGITATION: ICD-10-CM

## 2025-06-10 LAB
BASOPHILS # BLD AUTO: 0.01 X10(3) UL (ref 0–0.2)
BASOPHILS NFR BLD AUTO: 0.1 %
EOSINOPHIL # BLD AUTO: 0.07 X10(3) UL (ref 0–0.7)
EOSINOPHIL NFR BLD AUTO: 1 %
ERYTHROCYTE [DISTWIDTH] IN BLOOD BY AUTOMATED COUNT: 14.6 %
HCT VFR BLD AUTO: 30.9 % (ref 39–53)
HGB BLD-MCNC: 10.4 G/DL (ref 13–17.5)
IMM GRANULOCYTES # BLD AUTO: 0.08 X10(3) UL (ref 0–1)
IMM GRANULOCYTES NFR BLD: 1.1 %
LYMPHOCYTES # BLD AUTO: 1.89 X10(3) UL (ref 1–4)
LYMPHOCYTES NFR BLD AUTO: 26.6 %
MCH RBC QN AUTO: 29.1 PG (ref 26–34)
MCHC RBC AUTO-ENTMCNC: 33.7 G/DL (ref 31–37)
MCV RBC AUTO: 86.6 FL (ref 80–100)
MONOCYTES # BLD AUTO: 0.53 X10(3) UL (ref 0.1–1)
MONOCYTES NFR BLD AUTO: 7.5 %
NEUTROPHILS # BLD AUTO: 4.53 X10 (3) UL (ref 1.5–7.7)
NEUTROPHILS # BLD AUTO: 4.53 X10(3) UL (ref 1.5–7.7)
NEUTROPHILS NFR BLD AUTO: 63.7 %
PLATELET # BLD AUTO: 110 10(3)UL (ref 150–450)
RBC # BLD AUTO: 3.57 X10(6)UL (ref 3.8–5.8)
WBC # BLD AUTO: 7.1 X10(3) UL (ref 4–11)

## 2025-06-10 PROCEDURE — 85025 COMPLETE CBC W/AUTO DIFF WBC: CPT

## 2025-06-10 PROCEDURE — 36415 COLL VENOUS BLD VENIPUNCTURE: CPT

## 2025-06-16 ENCOUNTER — HOSPITAL ENCOUNTER (EMERGENCY)
Facility: HOSPITAL | Age: 85
Discharge: HOME | End: 2025-06-16
Attending: EMERGENCY MEDICINE
Payer: MEDICARE

## 2025-06-16 ENCOUNTER — APPOINTMENT (OUTPATIENT)
Dept: GENERAL RADIOLOGY | Facility: HOSPITAL | Age: 85
End: 2025-06-16
Attending: EMERGENCY MEDICINE
Payer: MEDICARE

## 2025-06-16 VITALS
WEIGHT: 150 LBS | DIASTOLIC BLOOD PRESSURE: 90 MMHG | BODY MASS INDEX: 23.54 KG/M2 | OXYGEN SATURATION: 99 % | TEMPERATURE: 98 F | HEART RATE: 100 BPM | SYSTOLIC BLOOD PRESSURE: 140 MMHG | HEIGHT: 67 IN | RESPIRATION RATE: 15 BRPM

## 2025-06-16 DIAGNOSIS — C25.9 MALIGNANT NEOPLASM OF PANCREAS, UNSPECIFIED LOCATION OF MALIGNANCY (HCC): ICD-10-CM

## 2025-06-16 DIAGNOSIS — N30.00 ACUTE CYSTITIS WITHOUT HEMATURIA: ICD-10-CM

## 2025-06-16 DIAGNOSIS — R73.9 HYPERGLYCEMIA: Primary | ICD-10-CM

## 2025-06-16 LAB
ALBUMIN SERPL-MCNC: 3.8 G/DL (ref 3.2–4.8)
ALBUMIN/GLOB SERPL: 1.7 {RATIO} (ref 1–2)
ALP LIVER SERPL-CCNC: 57 U/L (ref 45–117)
ALT SERPL-CCNC: 16 U/L (ref 10–49)
ANION GAP SERPL CALC-SCNC: 8 MMOL/L (ref 0–18)
AST SERPL-CCNC: 12 U/L (ref ?–34)
BASE EXCESS BLDV CALC-SCNC: 0.4 MMOL/L
BASOPHILS # BLD AUTO: 0.01 X10(3) UL (ref 0–0.2)
BASOPHILS NFR BLD AUTO: 0.2 %
BILIRUB SERPL-MCNC: 0.6 MG/DL (ref 0.2–1.1)
BILIRUB UR QL STRIP.AUTO: NEGATIVE
BUN BLD-MCNC: 23 MG/DL (ref 9–23)
CALCIUM BLD-MCNC: 8.7 MG/DL (ref 8.7–10.6)
CHLORIDE SERPL-SCNC: 102 MMOL/L (ref 98–112)
CLARITY UR REFRACT.AUTO: CLEAR
CO2 SERPL-SCNC: 26 MMOL/L (ref 21–32)
CREAT BLD-MCNC: 1.2 MG/DL (ref 0.7–1.3)
EGFRCR SERPLBLD CKD-EPI 2021: 60 ML/MIN/1.73M2 (ref 60–?)
EOSINOPHIL # BLD AUTO: 0.05 X10(3) UL (ref 0–0.7)
EOSINOPHIL NFR BLD AUTO: 1.2 %
ERYTHROCYTE [DISTWIDTH] IN BLOOD BY AUTOMATED COUNT: 15.6 %
GLOBULIN PLAS-MCNC: 2.2 G/DL (ref 2–3.5)
GLUCOSE BLD-MCNC: 329 MG/DL (ref 70–99)
GLUCOSE BLD-MCNC: 372 MG/DL (ref 70–99)
GLUCOSE BLD-MCNC: 388 MG/DL (ref 70–99)
GLUCOSE BLD-MCNC: 403 MG/DL (ref 70–99)
GLUCOSE UR STRIP.AUTO-MCNC: >1000 MG/DL
HCO3 BLDV-SCNC: 24.3 MEQ/L (ref 22–26)
HCT VFR BLD AUTO: 31.1 % (ref 39–53)
HGB BLD-MCNC: 10.4 G/DL (ref 13–17.5)
IMM GRANULOCYTES # BLD AUTO: 0.03 X10(3) UL (ref 0–1)
IMM GRANULOCYTES NFR BLD: 0.7 %
KETONES UR STRIP.AUTO-MCNC: NEGATIVE MG/DL
LEUKOCYTE ESTERASE UR QL STRIP.AUTO: 75
LYMPHOCYTES # BLD AUTO: 1.06 X10(3) UL (ref 1–4)
LYMPHOCYTES NFR BLD AUTO: 26 %
MCH RBC QN AUTO: 29.7 PG (ref 26–34)
MCHC RBC AUTO-ENTMCNC: 33.4 G/DL (ref 31–37)
MCV RBC AUTO: 88.9 FL (ref 80–100)
MONOCYTES # BLD AUTO: 0.57 X10(3) UL (ref 0.1–1)
MONOCYTES NFR BLD AUTO: 14 %
NEUTROPHILS # BLD AUTO: 2.36 X10 (3) UL (ref 1.5–7.7)
NEUTROPHILS # BLD AUTO: 2.36 X10(3) UL (ref 1.5–7.7)
NEUTROPHILS NFR BLD AUTO: 57.9 %
NITRITE UR QL STRIP.AUTO: NEGATIVE
OSMOLALITY SERPL CALC.SUM OF ELEC: 302 MOSM/KG (ref 275–295)
OXYHGB MFR BLDV: 49.6 % (ref 72–78)
PCO2 BLDV: 45 MM HG (ref 38–50)
PH BLDV: 7.37 [PH] (ref 7.33–7.43)
PH UR STRIP.AUTO: 5 [PH] (ref 5–8)
PLATELET # BLD AUTO: 116 10(3)UL (ref 150–450)
PO2 BLDV: 28 MM HG (ref 30–50)
POTASSIUM SERPL-SCNC: 4.3 MMOL/L (ref 3.5–5.1)
PROT SERPL-MCNC: 6 G/DL (ref 5.7–8.2)
RBC # BLD AUTO: 3.5 X10(6)UL (ref 3.8–5.8)
RBC #/AREA URNS AUTO: >10 /HPF
SODIUM SERPL-SCNC: 136 MMOL/L (ref 136–145)
SP GR UR STRIP.AUTO: >1.03 (ref 1–1.03)
UROBILINOGEN UR STRIP.AUTO-MCNC: NORMAL MG/DL
WBC # BLD AUTO: 4.1 X10(3) UL (ref 4–11)

## 2025-06-16 PROCEDURE — 82803 BLOOD GASES ANY COMBINATION: CPT | Performed by: EMERGENCY MEDICINE

## 2025-06-16 PROCEDURE — 96361 HYDRATE IV INFUSION ADD-ON: CPT

## 2025-06-16 PROCEDURE — 93005 ELECTROCARDIOGRAM TRACING: CPT

## 2025-06-16 PROCEDURE — 87086 URINE CULTURE/COLONY COUNT: CPT | Performed by: EMERGENCY MEDICINE

## 2025-06-16 PROCEDURE — 71045 X-RAY EXAM CHEST 1 VIEW: CPT | Performed by: EMERGENCY MEDICINE

## 2025-06-16 PROCEDURE — 99285 EMERGENCY DEPT VISIT HI MDM: CPT

## 2025-06-16 PROCEDURE — 80053 COMPREHEN METABOLIC PANEL: CPT | Performed by: EMERGENCY MEDICINE

## 2025-06-16 PROCEDURE — 85025 COMPLETE CBC W/AUTO DIFF WBC: CPT | Performed by: EMERGENCY MEDICINE

## 2025-06-16 PROCEDURE — 82962 GLUCOSE BLOOD TEST: CPT

## 2025-06-16 PROCEDURE — 81001 URINALYSIS AUTO W/SCOPE: CPT | Performed by: EMERGENCY MEDICINE

## 2025-06-16 PROCEDURE — 93010 ELECTROCARDIOGRAM REPORT: CPT

## 2025-06-16 PROCEDURE — 96360 HYDRATION IV INFUSION INIT: CPT

## 2025-06-16 RX ORDER — ATORVASTATIN CALCIUM 40 MG/1
40 TABLET, FILM COATED ORAL NIGHTLY
COMMUNITY
Start: 2025-05-05

## 2025-06-16 RX ORDER — CEPHALEXIN 500 MG/1
500 CAPSULE ORAL 2 TIMES DAILY
Qty: 14 CAPSULE | Refills: 0 | Status: SHIPPED | OUTPATIENT
Start: 2025-06-16 | End: 2025-06-23

## 2025-06-16 RX ORDER — INSULIN ASPART 100 [IU]/ML
0.15 INJECTION, SOLUTION INTRAVENOUS; SUBCUTANEOUS ONCE
Status: COMPLETED | OUTPATIENT
Start: 2025-06-16 | End: 2025-06-16

## 2025-06-16 RX ORDER — DEXAMETHASONE 4 MG/1
4 TABLET ORAL
COMMUNITY
Start: 2025-04-07

## 2025-06-16 NOTE — ED PROVIDER NOTES
Patient Seen in: [facility] Emergency Department        History  Chief Complaint   Patient presents with    Hyperglycemia     Stated Complaint: blood sugar over 400    Subjective:   HPI            84-year-old male who has a history of pancreatic cancer on chemotherapy who has had some borderline glucose issues and on metformin went to chemotherapy today and when Accu-Chek was done it was in the 400s and was sent here instead of having chemo.  He does says he has excessive thirst and increased frequency of urination is unsure how long he has been that way.  He denies any headaches or dizziness.  No chest pain or shortness of breath.  Nuys any abdominal pains.  No nausea, vomiting or diarrhea.  No fevers, chills or cough.  No dysuria.  Denies any other complaints at this time.      Objective:     Past Medical History:    Actinic keratoses    Arrhythmia    AFib    Bladder stones    Colon polyp    DEPRESSION    Depression    Diabetes (HCC)    Enlarged prostate with urinary retention    Esophageal reflux    Morocho catheter in place    Gastritis    Gastro-esophageal reflux disease with esophagitis    Glucose intolerance    Hearing impaired person, bilateral    Hiatal hernia    High blood pressure    HYPERTRIGLYCERIDEMIA    Incontinence    Has a urinary cathether, can be incontinent of stool with urinary urgency    LAE (left atrial enlargement)    LAE (left atrial enlargement)    Left ventricular diastolic dysfunction with preserved systolic function    LVH (left ventricular hypertrophy)    Mild ascending aorta dilation    Osteoarthrosis, unspecified whether generalized or localized, unspecified site    Other and unspecified hyperlipidemia    Pancreas cancer (HCC)    Pancreas cyst (HCC)    bx    Pulmonary embolism (HCC)    Many years ago    RENAL DISEASE    RLS (restless legs syndrome)    Synovial cyst of lumbar facet joint    Unspecified essential hypertension    Vasovagal syncope    Visual impairment    glasses               Past Surgical History:   Procedure Laterality Date    Colonoscopy  2010    POLYP/5YRS, Dr Garcia    Egd and esd - internal  2017    Esophageal bx benign, pancreatic cyst aspirartion.  Dr Holland    Remove bladder stone,>2.5cm  2025    with TURP    Tonsillectomy      Transurethral elec-surg prostatectom  2025    with bladder stones laser litho    Upper gi endoscopy - referral  2010    Upper gi endoscopy,exam  02/10/2025                Social History     Socioeconomic History    Marital status:    Tobacco Use    Smoking status: Former     Current packs/day: 0.00     Types: Cigarettes     Quit date: 1958     Years since quittin.5    Smokeless tobacco: Never   Vaping Use    Vaping status: Never Used   Substance and Sexual Activity    Alcohol use: Not Currently     Comment: 2-3/MONTH    Drug use: No    Sexual activity: Yes     Comment:      Social Drivers of Health     Food Insecurity: No Food Insecurity (2025)    NCSS - Food Insecurity     Worried About Running Out of Food in the Last Year: No     Ran Out of Food in the Last Year: No   Transportation Needs: No Transportation Needs (2025)    NCSS - Transportation     Lack of Transportation: No   Housing Stability: Not At Risk (2025)    NCSS - Housing/Utilities     Has Housing: Yes     Worried About Losing Housing: No     Unable to Get Utilities: No                                Physical Exam    ED Triage Vitals [25 1329]   /80   Pulse 87   Resp 18   Temp 98.1 °F (36.7 °C)   Temp src Temporal   SpO2 100 %   O2 Device None (Room air)       Current Vitals:   Vital Signs  BP: 143/81  Pulse: 67  Resp: 19  Temp: 98.1 °F (36.7 °C)  Temp src: Temporal  MAP (mmHg): 99    Oxygen Therapy  SpO2: 100 %  O2 Device: None (Room air)            Physical Exam  HEENT : NCAT, EOMI, PEERL,  neck supple, no JVD, trachea midline, No LAD  Heart: S1S2 normal. No murmurs, regular rate and rhythm  Lungs:  Clear to auscultation bilaterally  Abdomen: Soft nontender nondistended normal active bowel sounds without rebound, guarding or masses noted  Back nontender without CVA tenderness  Extremity no clubbing, cyanosis or edema noted.  Full range of motion noted without tenderness  Neuro: No focal deficits noted    All measures to prevent infection transmission during my interaction with the patient were taken.  The patient was already wearing droplet mask on my arrival to the room.  Personal protective equipment including a droplet mask as well as gloves were worn throughout the duration of my exam.  Hand washing was performed prior to and after the exam.  Stethoscope and equipment used during my examination was cleaned with a super Sani cloth germicidal wipe following the exam.        ED Course  Labs Reviewed   CBC WITH DIFFERENTIAL WITH PLATELET - Abnormal; Notable for the following components:       Result Value    RBC 3.50 (*)     HGB 10.4 (*)     HCT 31.1 (*)     .0 (*)     All other components within normal limits   COMP METABOLIC PANEL (14) - Abnormal; Notable for the following components:    Glucose 388 (*)     Calculated Osmolality 302 (*)     All other components within normal limits   VENOUS BLOOD GAS - Abnormal; Notable for the following components:    Venous pO2 28 (*)     Venous O2Hb 49.6 (*)     All other components within normal limits   URINALYSIS WITH CULTURE REFLEX - Abnormal; Notable for the following components:    Spec Gravity >1.030 (*)     Glucose Urine >1000 (*)     Blood Urine 3+ (*)     Protein Urine Trace (*)     Leukocyte Esterase Urine 75 (*)     WBC Urine 21-50 (*)     RBC Urine >10 (*)     Squamous Epi. Cells Few (*)     All other components within normal limits   POCT GLUCOSE - Abnormal; Notable for the following components:    POC Glucose 403 (*)     All other components within normal limits   POCT GLUCOSE - Abnormal; Notable for the following components:    POC Glucose 372 (*)      All other components within normal limits   POCT GLUCOSE - Abnormal; Notable for the following components:    POC Glucose 329 (*)     All other components within normal limits   RAINBOW DRAW LAVENDER   RAINBOW DRAW LIGHT GREEN   RAINBOW DRAW BLUE   RAINBOW DRAW GOLD   URINE CULTURE, ROUTINE     EKG    Rate, intervals and axes as noted on EKG Report.  Rate: 95  Rhythm: Atrial flutter  Reading: QRS of 92 patient has atrial flutter with variable block without ischemic change.           ED Course as of 06/16/25 1639  ------------------------------------------------------------  Time: 06/16 1635  Comment: While here the patient's initial CBC had a hemoglobin of 10.4.  The urinalysis shows a UTI.  The patient here had a venous blood gas showing no acidosis.  The CMP was otherwise unremarkable.  IV fluid and NovoLog were given while here.  The patient is doing well while here with improved glucose without DKA.  I spoke with Dr. Samuel who saw the patient in the department explained the patient to be discharged home from here and he has set up appointment with him to be seen tomorrow for outpatient training for insulin use such.  While the patient had chest x-ray on that upper center which showed no acute cardiopulmonary process.  I read the radiology report as well.     Medications   sodium chloride 0.9 % IV bolus 1,000 mL (0 mL Intravenous Stopped 6/16/25 1615)   insulin aspart (NovoLOG) 100 Units/mL vial 10 Units (10 Units Subcutaneous Given 6/16/25 1625)     XR CHEST AP PORTABLE  (CPT=71045)  Result Date: 6/16/2025  PROCEDURE:  XR CHEST AP PORTABLE  (CPT=71045)  TECHNIQUE:  AP chest radiograph was obtained.  COMPARISON:  [facility] , XR, XR CHEST AP PORTABLE  (CPT=71045), 5/19/2025, 2:40 PM.  INDICATIONS:  blood sugar over 400  PATIENT STATED HISTORY: (As transcribed by Technologist)  Patient offered no additional history at this time.    FINDINGS:  The heart is borderline in size.  The lungs are clear of  acute-appearing disease process.  The costophrenic angles are sharp.  There is no active disease seen on the basis of portable radiography. CT compatible central venous catheter tip in the SVC.             CONCLUSION:  Borderline heart size. No active disease seen.   LOCATION:  [id number]      Dictated by (CST): Abebe Sims MD on 6/16/2025 at 2:59 PM     Finalized by (CST): Abebe Sims MD on 6/16/2025 at 2:59 PM       XR CHEST AP PORTABLE  (CPT=71045)  Result Date: 5/19/2025  PROCEDURE:  XR CHEST AP PORTABLE  (CPT=71045)  TECHNIQUE:  AP chest radiograph was obtained.  COMPARISON:  [facility] , XR, XR CHEST AP PORTABLE  (CPT=71045), 4/17/2025, 10:07 PM.  INDICATIONS:  Low hemoglobin  PATIENT STATED HISTORY: (As transcribed by Technologist)  Pt. with  Low hemoglobin .    FINDINGS:  Heart size is within normal limits.  Pleural spaces appear clear.  Mediastinal and hilar contours are normal.  No focal consolidation.  Support devices appear overall stable.            CONCLUSION:  Overall stable appearance of the chest.   LOCATION:  [id number]      Dictated by (CST): Peyman Garcia MD on 5/19/2025 at 3:11 PM     Finalized by (CST): Peyman Garcia MD on 5/19/2025 at 3:14 PM                         MDM     Differential diagnosis included DKA, infection electrolyte abnormality but not limited to such.  The patient does have hyperglycemia.  His glucose was treated here with IV fluids and insulin.  He was seen by Dr. Samuel and is stable and at this time the patient be discharged with the emergency room but be seen in appointment tomorrow for further care for diabetes.  The patient does have urine tract infection be treated as well.      Patient was screened and evaluated during this visit.   As a treating physician attending to the patient, I determined, within reasonable clinical confidence and prior to discharge, that an emergency medical condition was not or was no longer present.  There was no indication for further evaluation,  treatment or admission on an emergency basis.       The usual and customary discharge instuctions were discussed given the patient's ER course.  We discussed signs and symptoms that should prompt the patient's immediate return to the emergency department.   Reasonable over the counter and prescription treatment options and Physician follow up plan was discussed.       The patient is discharged in good condition.     This note was prepared using Dragon Medical voice recognition dictation software.  As a result errors may occur.  When identified to these areas have been corrected.  While every attempt is made to correct errors during dictation discrepancies may still exist.  Please contact if there are any errors.        Medical Decision Making      Disposition and Plan     Clinical Impression:  1. Hyperglycemia    2. Malignant neoplasm of pancreas, unspecified location of malignancy (HCC)    3. Acute cystitis without hematuria         Disposition:  Discharge  6/16/2025  4:38 pm    Follow-up:  Alis Delgado MD  [address] [place] [address]  [phone]    Go in 1 day(s)            Medications Prescribed:  Current Discharge Medication List        START taking these medications    Details   cephALEXin 500 MG Oral Cap Take 1 capsule (500 mg total) by mouth 2 (two) times daily for 7 days.  Qty: 14 capsule, Refills: 0                   Supplementary Documentation:

## 2025-06-16 NOTE — ED QUICK NOTES
Pt resting in bed. Denies any needs. Aware we are waiting for the rest of lab results. IV fluids still infusing through port.

## 2025-06-16 NOTE — ED INITIAL ASSESSMENT (HPI)
Patient presents for evaluation of elevated blood sugar. He is currently undergoing chemotherapy for pancreatic cancer and had labs completed prior to his treatment today that showed an elevated blood glucose. He reports history of borderline diabetes and is currently taking metformin.

## 2025-06-16 NOTE — H&P
General Medicine H&P     Chief Complaint   Patient presents with    Hyperglycemia        PCP: Alis Delgado MD    History of Present Illness: Patient is a 84 year old male with PMH including but not limited to pancreatic ca on chemo who p/t EH ED c increased Glc and thirst/urination.     Borderline DM per hx, had been on metformin. He wants to go home.        Past Medical History[1]   Past Surgical History[2]     ALL:  Allergies[3]     Scheduled Meds[4]    Social History     Tobacco Use    Smoking status: Former     Current packs/day: 0.00     Types: Cigarettes     Quit date: 1958     Years since quittin.5    Smokeless tobacco: Never   Substance Use Topics    Alcohol use: Not Currently     Comment: 2-3/MONTH        Fam Hx  Family History[5]    Review of Systems  Comprehensive ROS reviewed and negative except for what's stated above. \    OBJECTIVE:  /90   Pulse 100   Temp 98.1 °F (36.7 °C) (Temporal)   Resp 15   Ht 5' 7\" (1.702 m)   Wt 150 lb (68 kg)   SpO2 99%   BMI 23.49 kg/m²     General:  Alert, no distress, appears stated age.    Head:  Normocephalic, without obvious abnormality, atraumatic.   Eyes:  Sclera anicteric, EOMs intact.    Nose: Nares normal,  Mucosa normal    Throat: Lips normal   Neck: Supple, symmetrical, trachea midline   Lungs:   Clear to auscultation bilaterally. Normal effort   Chest wall:  No tenderness or deformity   Heart:  Regular rate and rhythm, S1, S2 normal, no murmur, rub or gallop appreciated   Abdomen:   Soft, NT/ND, Bowel sounds normal. No masses,  No organomegaly.    Extremities/MSK: Extremities normal/normal movement, atraumatic, no cyanosis  or edema.   Skin: Skin color, texture, turgor normal. No rashes or lesions.    Neurologic: Moving all extremities spontaneously, no focal deficit appreciated          LABS:   Lab Results   Component Value Date    WBC 4.1 2025    HGB 10.4 2025    HCT 31.1 2025    .0 2025    CREATSERUM 1.20  06/16/2025    BUN 23 06/16/2025     06/16/2025    K 4.3 06/16/2025     06/16/2025    CO2 26.0 06/16/2025     06/16/2025    CA 8.7 06/16/2025    ALB 3.8 06/16/2025    ALKPHO 57 06/16/2025    BILT 0.6 06/16/2025    TP 6.0 06/16/2025    AST 12 06/16/2025    ALT 16 06/16/2025    PGLU 329 06/16/2025       Radiology: XR CHEST AP PORTABLE  (CPT=71045)  Result Date: 6/16/2025  PROCEDURE:  XR CHEST AP PORTABLE  (CPT=71045)  TECHNIQUE:  AP chest radiograph was obtained.  COMPARISON:  [facility] , XR, XR CHEST AP PORTABLE  (CPT=71045), 5/19/2025, 2:40 PM.  INDICATIONS:  blood sugar over 400  PATIENT STATED HISTORY: (As transcribed by Technologist)  Patient offered no additional history at this time.    FINDINGS:  The heart is borderline in size.  The lungs are clear of acute-appearing disease process.  The costophrenic angles are sharp.  There is no active disease seen on the basis of portable radiography. CT compatible central venous catheter tip in the SVC.             CONCLUSION:  Borderline heart size. No active disease seen.   LOCATION:  [id number]      Dictated by (CST): Abebe Sims MD on 6/16/2025 at 2:59 PM     Finalized by (CST): Abebe Sims MD on 6/16/2025 at 2:59 PM       XR CHEST AP PORTABLE  (CPT=71045)  Result Date: 5/19/2025  PROCEDURE:  XR CHEST AP PORTABLE  (CPT=71045)  TECHNIQUE:  AP chest radiograph was obtained.  COMPARISON:  [facility] , XR, XR CHEST AP PORTABLE  (CPT=71045), 4/17/2025, 10:07 PM.  INDICATIONS:  Low hemoglobin  PATIENT STATED HISTORY: (As transcribed by Technologist)  Pt. with  Low hemoglobin .    FINDINGS:  Heart size is within normal limits.  Pleural spaces appear clear.  Mediastinal and hilar contours are normal.  No focal consolidation.  Support devices appear overall stable.            CONCLUSION:  Overall stable appearance of the chest.   LOCATION:  [id number]      Dictated by (CST): Peyman Garcia MD on 5/19/2025 at 3:11 PM     Finalized by (CST): Peyman Garcia MD on 5/19/2025  at 3:14 PM            ASSESSMENT / PLAN:    84 year old male with PMH including but not limited to pancreatic ca on chemo who p/t EH ED c increased Glc and thirst/urination.     Hyperglycemia  -set up close PCP f/u, not currently in DKA, has metastatic pancreatic ca on chemo likely affecting pancreas, had metformin  - may need insulin  - appt 6/17@11:30a    Possible UTI  - keflex    Tachycardia  - noted to be 120s when I saw, pt said was worked up, d/w ED monitor to ensure improvement     Outpatient records or previous hospital records reviewed. [facility] hospitalist to continue to follow patient while in house. A total of 75 minutes taken.  Greater than 50% face to face encounter.  D/w Anton Chandler MD Charles Yohannan, MD  [facility] Hospitalist  Pager: [phone]  6/16/2025  5:13 PM               [1]   Past Medical History:   Actinic keratoses    Arrhythmia    AFib    Bladder stones    Colon polyp    DEPRESSION    Depression    Diabetes (HCC)    Enlarged prostate with urinary retention    Esophageal reflux    Morocho catheter in place    Gastritis    Gastro-esophageal reflux disease with esophagitis    Glucose intolerance    Hearing impaired person, bilateral    Hiatal hernia    High blood pressure    HYPERTRIGLYCERIDEMIA    Incontinence    Has a urinary cathether, can be incontinent of stool with urinary urgency    LAE (left atrial enlargement)    LAE (left atrial enlargement)    Left ventricular diastolic dysfunction with preserved systolic function    LVH (left ventricular hypertrophy)    Mild ascending aorta dilation    Osteoarthrosis, unspecified whether generalized or localized, unspecified site    Other and unspecified hyperlipidemia    Pancreas cancer (HCC)    Pancreas cyst (HCC)    bx    Pulmonary embolism (HCC)    Many years ago    RENAL DISEASE    RLS (restless legs syndrome)    Synovial cyst of lumbar facet joint    Unspecified essential hypertension    Vasovagal syncope    Visual  impairment    glasses   [2]   Past Surgical History:  Procedure Laterality Date    Colonoscopy  06/22/2010    POLYP/5YRS, Dr Garcia    Egd and esd - internal  12/11/2017    Esophageal bx benign, pancreatic cyst aspirartion.  Dr Holland    Remove bladder stone,>2.5cm  02/20/2025    with TURP    Tonsillectomy      Transurethral elec-surg prostatectom  02/20/2025    with bladder stones laser litho    Upper gi endoscopy - referral  06/22/2010    Upper gi endoscopy,exam  02/10/2025   [3]   Allergies  Allergen Reactions    Pcn [Penicillins]      \" FAINTED\"   [4] [5]   Family History  Problem Relation Age of Onset    Cancer Father         esophageal    Cancer Mother         breast

## 2025-06-17 LAB
ATRIAL RATE: 319 BPM
Q-T INTERVAL: 346 MS
QRS DURATION: 92 MS
QTC CALCULATION (BEZET): 434 MS
R AXIS: 18 DEGREES
T AXIS: 3 DEGREES
VENTRICULAR RATE: 95 BPM

## 2025-07-07 NOTE — PROGRESS NOTES
NURSING DISCHARGE NOTE    Discharged Home via Wheelchair.  Accompanied by Support staff  Belongings Taken by patient/family.    Removed IV and deaccessed port, patient tolerated it well. All questions answered. Discharge papers were given and all questions were answered.   10:06 PM Recheck on patient. Discussed with patient ED findings and plan for discharge. Patient was given ED warnings, discharge instructions, and follow up information to go home with. Patient understands and agrees with plan for discharge. Any questions have been answered.

## 2025-07-28 ENCOUNTER — HOSPITAL ENCOUNTER (INPATIENT)
Facility: HOSPITAL | Age: 85
LOS: 2 days | Discharge: HOME HEALTH | End: 2025-07-30
Attending: EMERGENCY MEDICINE | Admitting: INTERNAL MEDICINE

## 2025-07-28 ENCOUNTER — APPOINTMENT (OUTPATIENT)
Dept: CT IMAGING | Facility: HOSPITAL | Age: 85
End: 2025-07-28
Attending: EMERGENCY MEDICINE

## 2025-07-28 ENCOUNTER — APPOINTMENT (OUTPATIENT)
Dept: GENERAL RADIOLOGY | Facility: HOSPITAL | Age: 85
End: 2025-07-28

## 2025-07-28 DIAGNOSIS — R26.2 UNABLE TO AMBULATE: ICD-10-CM

## 2025-07-28 DIAGNOSIS — R06.09 DYSPNEA ON EXERTION: Primary | ICD-10-CM

## 2025-07-28 DIAGNOSIS — E83.42 HYPOMAGNESEMIA: ICD-10-CM

## 2025-07-28 DIAGNOSIS — R79.89 ELEVATED TROPONIN: ICD-10-CM

## 2025-07-28 DIAGNOSIS — R53.1 WEAKNESS GENERALIZED: ICD-10-CM

## 2025-07-28 LAB
ALBUMIN SERPL-MCNC: 3.8 G/DL (ref 3.2–4.8)
ALBUMIN/GLOB SERPL: 1.6 (ref 1–2)
ALP LIVER SERPL-CCNC: 86 U/L (ref 45–117)
ALT SERPL-CCNC: 13 U/L (ref 10–49)
ANION GAP SERPL CALC-SCNC: 8 MMOL/L (ref 0–18)
APTT PPP: 35.3 SECONDS (ref 23–36)
AST SERPL-CCNC: 18 U/L (ref ?–34)
ATRIAL RATE: 356 BPM
ATRIAL RATE: 375 BPM
BASOPHILS # BLD AUTO: 0.07 X10(3) UL (ref 0–0.2)
BASOPHILS NFR BLD AUTO: 1.6 %
BILIRUB SERPL-MCNC: 0.9 MG/DL (ref 0.2–1.1)
BILIRUB UR QL STRIP.AUTO: NEGATIVE
BUN BLD-MCNC: 14 MG/DL (ref 9–23)
CALCIUM BLD-MCNC: 9 MG/DL (ref 8.7–10.6)
CHLORIDE SERPL-SCNC: 103 MMOL/L (ref 98–112)
CHOLEST SERPL-MCNC: 85 MG/DL (ref ?–200)
CLARITY UR REFRACT.AUTO: CLEAR
CO2 SERPL-SCNC: 28 MMOL/L (ref 21–32)
COLOR UR AUTO: YELLOW
CREAT BLD-MCNC: 1.14 MG/DL (ref 0.7–1.3)
EGFRCR SERPLBLD CKD-EPI 2021: 63 ML/MIN/1.73M2 (ref 60–?)
EOSINOPHIL # BLD AUTO: 0.13 X10(3) UL (ref 0–0.7)
EOSINOPHIL NFR BLD AUTO: 3 %
ERYTHROCYTE [DISTWIDTH] IN BLOOD BY AUTOMATED COUNT: 15.7 %
GLOBULIN PLAS-MCNC: 2.4 G/DL (ref 2–3.5)
GLUCOSE BLD-MCNC: 167 MG/DL (ref 70–99)
GLUCOSE BLD-MCNC: 189 MG/DL (ref 70–99)
GLUCOSE BLD-MCNC: 214 MG/DL (ref 70–99)
GLUCOSE UR STRIP.AUTO-MCNC: NORMAL MG/DL
HCT VFR BLD AUTO: 31.7 % (ref 39–53)
HDLC SERPL-MCNC: 34 MG/DL (ref 40–59)
HGB BLD-MCNC: 10.7 G/DL (ref 13–17.5)
IMM GRANULOCYTES # BLD AUTO: 0.07 X10(3) UL (ref 0–1)
IMM GRANULOCYTES NFR BLD: 1.6 %
INR BLD: 1.76 (ref 0.8–1.2)
KETONES UR STRIP.AUTO-MCNC: NEGATIVE MG/DL
LDLC SERPL CALC-MCNC: 29 MG/DL (ref ?–100)
LEUKOCYTE ESTERASE UR QL STRIP.AUTO: NEGATIVE
LYMPHOCYTES # BLD AUTO: 0.82 X10(3) UL (ref 1–4)
LYMPHOCYTES NFR BLD AUTO: 19.2 %
MAGNESIUM SERPL-MCNC: 1.1 MG/DL (ref 1.6–2.6)
MCH RBC QN AUTO: 28.2 PG (ref 26–34)
MCHC RBC AUTO-ENTMCNC: 33.8 G/DL (ref 31–37)
MCV RBC AUTO: 83.6 FL (ref 80–100)
MONOCYTES # BLD AUTO: 0.62 X10(3) UL (ref 0.1–1)
MONOCYTES NFR BLD AUTO: 14.5 %
NEUTROPHILS # BLD AUTO: 2.56 X10 (3) UL (ref 1.5–7.7)
NEUTROPHILS # BLD AUTO: 2.56 X10(3) UL (ref 1.5–7.7)
NEUTROPHILS NFR BLD AUTO: 60.1 %
NITRITE UR QL STRIP.AUTO: NEGATIVE
NONHDLC SERPL-MCNC: 51 MG/DL (ref ?–130)
NT-PROBNP SERPL-MCNC: 6953 PG/ML (ref ?–450)
NT-PROBNP SERPL-MCNC: 7678 PG/ML (ref ?–450)
OSMOLALITY SERPL CALC.SUM OF ELEC: 295 MOSM/KG (ref 275–295)
P AXIS: 124 DEGREES
PH UR STRIP.AUTO: 6.5 (ref 5–8)
PLATELET # BLD AUTO: 183 10(3)UL (ref 150–450)
POTASSIUM SERPL-SCNC: 3.8 MMOL/L (ref 3.5–5.1)
PROT SERPL-MCNC: 6.2 G/DL (ref 5.7–8.2)
PROT UR STRIP.AUTO-MCNC: 30 MG/DL
PROTHROMBIN TIME: 20.7 SECONDS (ref 11.6–14.8)
Q-T INTERVAL: 328 MS
Q-T INTERVAL: 366 MS
QRS DURATION: 90 MS
QRS DURATION: 94 MS
QTC CALCULATION (BEZET): 445 MS
QTC CALCULATION (BEZET): 469 MS
R AXIS: 24 DEGREES
R AXIS: 6 DEGREES
RBC # BLD AUTO: 3.79 X10(6)UL (ref 3.8–5.8)
RBC #/AREA URNS AUTO: >10 /HPF
SODIUM SERPL-SCNC: 139 MMOL/L (ref 136–145)
SP GR UR STRIP.AUTO: 1.02 (ref 1–1.03)
T AXIS: 29 DEGREES
T AXIS: 7 DEGREES
TRIGL SERPL-MCNC: 119 MG/DL (ref 30–149)
TROPONIN I SERPL HS-MCNC: 122 NG/L (ref ?–53)
TROPONIN I SERPL HS-MCNC: 133 NG/L (ref ?–53)
UROBILINOGEN UR STRIP.AUTO-MCNC: NORMAL MG/DL
VENTRICULAR RATE: 123 BPM
VENTRICULAR RATE: 89 BPM
VLDLC SERPL CALC-MCNC: 16 MG/DL (ref 0–30)
WBC # BLD AUTO: 4.3 X10(3) UL (ref 4–11)

## 2025-07-28 PROCEDURE — 96375 TX/PRO/DX INJ NEW DRUG ADDON: CPT

## 2025-07-28 PROCEDURE — 85730 THROMBOPLASTIN TIME PARTIAL: CPT | Performed by: EMERGENCY MEDICINE

## 2025-07-28 PROCEDURE — 71275 CT ANGIOGRAPHY CHEST: CPT | Performed by: EMERGENCY MEDICINE

## 2025-07-28 PROCEDURE — 93005 ELECTROCARDIOGRAM TRACING: CPT

## 2025-07-28 PROCEDURE — 96361 HYDRATE IV INFUSION ADD-ON: CPT

## 2025-07-28 PROCEDURE — 96365 THER/PROPH/DIAG IV INF INIT: CPT

## 2025-07-28 PROCEDURE — 80053 COMPREHEN METABOLIC PANEL: CPT | Performed by: EMERGENCY MEDICINE

## 2025-07-28 PROCEDURE — 80061 LIPID PANEL: CPT | Performed by: EMERGENCY MEDICINE

## 2025-07-28 PROCEDURE — 85025 COMPLETE CBC W/AUTO DIFF WBC: CPT | Performed by: EMERGENCY MEDICINE

## 2025-07-28 PROCEDURE — 83880 ASSAY OF NATRIURETIC PEPTIDE: CPT | Performed by: EMERGENCY MEDICINE

## 2025-07-28 PROCEDURE — 84484 ASSAY OF TROPONIN QUANT: CPT | Performed by: EMERGENCY MEDICINE

## 2025-07-28 PROCEDURE — 99285 EMERGENCY DEPT VISIT HI MDM: CPT

## 2025-07-28 PROCEDURE — 83735 ASSAY OF MAGNESIUM: CPT | Performed by: EMERGENCY MEDICINE

## 2025-07-28 PROCEDURE — 85610 PROTHROMBIN TIME: CPT | Performed by: EMERGENCY MEDICINE

## 2025-07-28 PROCEDURE — 82962 GLUCOSE BLOOD TEST: CPT

## 2025-07-28 PROCEDURE — 93010 ELECTROCARDIOGRAM REPORT: CPT

## 2025-07-28 PROCEDURE — 71045 X-RAY EXAM CHEST 1 VIEW: CPT

## 2025-07-28 PROCEDURE — 81001 URINALYSIS AUTO W/SCOPE: CPT | Performed by: EMERGENCY MEDICINE

## 2025-07-28 RX ORDER — INSULIN GLARGINE 100 [IU]/ML
10 INJECTION, SOLUTION SUBCUTANEOUS NIGHTLY
COMMUNITY

## 2025-07-28 RX ORDER — METOPROLOL TARTRATE 100 MG/1
100 TABLET ORAL
Status: DISCONTINUED | OUTPATIENT
Start: 2025-07-28 | End: 2025-07-30

## 2025-07-28 RX ORDER — MAGNESIUM GLYCINATE 100 MG
100 TABLET ORAL DAILY
COMMUNITY
Start: 2025-07-14

## 2025-07-28 RX ORDER — METOCLOPRAMIDE HYDROCHLORIDE 5 MG/ML
5 INJECTION INTRAMUSCULAR; INTRAVENOUS EVERY 8 HOURS PRN
Status: DISCONTINUED | OUTPATIENT
Start: 2025-07-28 | End: 2025-07-30

## 2025-07-28 RX ORDER — FUROSEMIDE 10 MG/ML
40 INJECTION INTRAMUSCULAR; INTRAVENOUS ONCE
Status: COMPLETED | OUTPATIENT
Start: 2025-07-28 | End: 2025-07-28

## 2025-07-28 RX ORDER — ONDANSETRON 2 MG/ML
4 INJECTION INTRAMUSCULAR; INTRAVENOUS EVERY 6 HOURS PRN
Status: DISCONTINUED | OUTPATIENT
Start: 2025-07-28 | End: 2025-07-30

## 2025-07-28 RX ORDER — IOPAMIDOL 755 MG/ML
100 INJECTION, SOLUTION INTRAVASCULAR
Status: COMPLETED | OUTPATIENT
Start: 2025-07-28 | End: 2025-07-28

## 2025-07-28 RX ORDER — FINASTERIDE 5 MG/1
5 TABLET, FILM COATED ORAL DAILY
Status: DISCONTINUED | OUTPATIENT
Start: 2025-07-29 | End: 2025-07-30

## 2025-07-28 RX ORDER — TRAMADOL HYDROCHLORIDE 50 MG/1
50 TABLET ORAL 3 TIMES DAILY PRN
Status: DISCONTINUED | OUTPATIENT
Start: 2025-07-28 | End: 2025-07-30

## 2025-07-28 RX ORDER — PANTOPRAZOLE SODIUM 40 MG/1
40 TABLET, DELAYED RELEASE ORAL
Status: DISCONTINUED | OUTPATIENT
Start: 2025-07-29 | End: 2025-07-30

## 2025-07-28 RX ORDER — AMITRIPTYLINE HYDROCHLORIDE 10 MG/1
10 TABLET ORAL NIGHTLY
Status: DISCONTINUED | OUTPATIENT
Start: 2025-07-28 | End: 2025-07-30

## 2025-07-28 RX ORDER — DEXTROSE MONOHYDRATE 25 G/50ML
50 INJECTION, SOLUTION INTRAVENOUS
Status: DISCONTINUED | OUTPATIENT
Start: 2025-07-28 | End: 2025-07-30

## 2025-07-28 RX ORDER — DILTIAZEM HYDROCHLORIDE 5 MG/ML
5 INJECTION INTRAVENOUS ONCE
Status: COMPLETED | OUTPATIENT
Start: 2025-07-28 | End: 2025-07-28

## 2025-07-28 RX ORDER — INSULIN DEGLUDEC 100 U/ML
5 INJECTION, SOLUTION SUBCUTANEOUS NIGHTLY
Status: DISCONTINUED | OUTPATIENT
Start: 2025-07-28 | End: 2025-07-30

## 2025-07-28 RX ORDER — ACETAMINOPHEN 500 MG
500 TABLET ORAL EVERY 4 HOURS PRN
Status: DISCONTINUED | OUTPATIENT
Start: 2025-07-28 | End: 2025-07-30

## 2025-07-28 RX ORDER — MAGNESIUM SULFATE HEPTAHYDRATE 40 MG/ML
2 INJECTION, SOLUTION INTRAVENOUS ONCE
Status: COMPLETED | OUTPATIENT
Start: 2025-07-28 | End: 2025-07-28

## 2025-07-28 RX ORDER — INSULIN GLARGINE-YFGN 100 [IU]/ML
10 INJECTION, SOLUTION SUBCUTANEOUS DAILY
Status: ON HOLD | COMMUNITY
Start: 2025-07-10 | End: 2025-07-28 | Stop reason: CLARIF

## 2025-07-28 RX ORDER — NITROGLYCERIN 20 MG/100ML
50 INJECTION INTRAVENOUS CONTINUOUS
Status: DISCONTINUED | OUTPATIENT
Start: 2025-07-28 | End: 2025-07-28

## 2025-07-28 RX ORDER — ATORVASTATIN CALCIUM 40 MG/1
40 TABLET, FILM COATED ORAL NIGHTLY
Status: DISCONTINUED | OUTPATIENT
Start: 2025-07-28 | End: 2025-07-30

## 2025-07-28 RX ORDER — NICOTINE POLACRILEX 4 MG
30 LOZENGE BUCCAL
Status: DISCONTINUED | OUTPATIENT
Start: 2025-07-28 | End: 2025-07-30

## 2025-07-28 RX ORDER — TRAMADOL HYDROCHLORIDE 50 MG/1
50 TABLET ORAL 3 TIMES DAILY PRN
COMMUNITY
Start: 2025-06-26

## 2025-07-28 RX ORDER — DILTIAZEM HYDROCHLORIDE 120 MG/1
120 CAPSULE, EXTENDED RELEASE ORAL DAILY
Status: DISCONTINUED | OUTPATIENT
Start: 2025-07-29 | End: 2025-07-29

## 2025-07-28 RX ORDER — NICOTINE POLACRILEX 4 MG
15 LOZENGE BUCCAL
Status: DISCONTINUED | OUTPATIENT
Start: 2025-07-28 | End: 2025-07-30

## 2025-07-29 ENCOUNTER — APPOINTMENT (OUTPATIENT)
Dept: CV DIAGNOSTICS | Facility: HOSPITAL | Age: 85
End: 2025-07-29
Attending: NURSE PRACTITIONER

## 2025-07-29 LAB
ALBUMIN SERPL-MCNC: 3.6 G/DL (ref 3.2–4.8)
ALBUMIN/GLOB SERPL: 1.6 (ref 1–2)
ALP LIVER SERPL-CCNC: 80 U/L (ref 45–117)
ALT SERPL-CCNC: 11 U/L (ref 10–49)
ANION GAP SERPL CALC-SCNC: 12 MMOL/L (ref 0–18)
AST SERPL-CCNC: 17 U/L (ref ?–34)
BILIRUB SERPL-MCNC: 0.9 MG/DL (ref 0.2–1.1)
BUN BLD-MCNC: 12 MG/DL (ref 9–23)
CALCIUM BLD-MCNC: 8.8 MG/DL (ref 8.7–10.6)
CHLORIDE SERPL-SCNC: 103 MMOL/L (ref 98–112)
CK SERPL-CCNC: 18 U/L (ref 46–171)
CO2 SERPL-SCNC: 26 MMOL/L (ref 21–32)
CREAT BLD-MCNC: 1.09 MG/DL (ref 0.7–1.3)
EGFRCR SERPLBLD CKD-EPI 2021: 67 ML/MIN/1.73M2 (ref 60–?)
ERYTHROCYTE [DISTWIDTH] IN BLOOD BY AUTOMATED COUNT: 15.6 %
GLOBULIN PLAS-MCNC: 2.3 G/DL (ref 2–3.5)
GLUCOSE BLD-MCNC: 141 MG/DL (ref 70–99)
GLUCOSE BLD-MCNC: 144 MG/DL (ref 70–99)
GLUCOSE BLD-MCNC: 216 MG/DL (ref 70–99)
GLUCOSE BLD-MCNC: 238 MG/DL (ref 70–99)
GLUCOSE BLD-MCNC: 250 MG/DL (ref 70–99)
HCT VFR BLD AUTO: 30.8 % (ref 39–53)
HGB BLD-MCNC: 10.5 G/DL (ref 13–17.5)
MAGNESIUM SERPL-MCNC: 1.4 MG/DL (ref 1.6–2.6)
MAGNESIUM SERPL-MCNC: 2.3 MG/DL (ref 1.6–2.6)
MCH RBC QN AUTO: 28 PG (ref 26–34)
MCHC RBC AUTO-ENTMCNC: 34.1 G/DL (ref 31–37)
MCV RBC AUTO: 82.1 FL (ref 80–100)
OSMOLALITY SERPL CALC.SUM OF ELEC: 294 MOSM/KG (ref 275–295)
PLATELET # BLD AUTO: 149 10(3)UL (ref 150–450)
POTASSIUM SERPL-SCNC: 3.1 MMOL/L (ref 3.5–5.1)
POTASSIUM SERPL-SCNC: 4.2 MMOL/L (ref 3.5–5.1)
PROT SERPL-MCNC: 5.9 G/DL (ref 5.7–8.2)
RBC # BLD AUTO: 3.75 X10(6)UL (ref 3.8–5.8)
SODIUM SERPL-SCNC: 141 MMOL/L (ref 136–145)
TSI SER-ACNC: 1.36 UIU/ML (ref 0.55–4.78)
VIT B12 SERPL-MCNC: 468 PG/ML (ref 211–911)
WBC # BLD AUTO: 4 X10(3) UL (ref 4–11)

## 2025-07-29 PROCEDURE — 82550 ASSAY OF CK (CPK): CPT | Performed by: HOSPITALIST

## 2025-07-29 PROCEDURE — 85027 COMPLETE CBC AUTOMATED: CPT | Performed by: HOSPITALIST

## 2025-07-29 PROCEDURE — 97165 OT EVAL LOW COMPLEX 30 MIN: CPT

## 2025-07-29 PROCEDURE — 97161 PT EVAL LOW COMPLEX 20 MIN: CPT

## 2025-07-29 PROCEDURE — 97530 THERAPEUTIC ACTIVITIES: CPT

## 2025-07-29 PROCEDURE — 80053 COMPREHEN METABOLIC PANEL: CPT | Performed by: HOSPITALIST

## 2025-07-29 PROCEDURE — 83735 ASSAY OF MAGNESIUM: CPT | Performed by: HOSPITALIST

## 2025-07-29 PROCEDURE — 82962 GLUCOSE BLOOD TEST: CPT

## 2025-07-29 PROCEDURE — 84132 ASSAY OF SERUM POTASSIUM: CPT | Performed by: HOSPITALIST

## 2025-07-29 PROCEDURE — 93306 TTE W/DOPPLER COMPLETE: CPT | Performed by: NURSE PRACTITIONER

## 2025-07-29 PROCEDURE — 82607 VITAMIN B-12: CPT | Performed by: HOSPITALIST

## 2025-07-29 PROCEDURE — 84443 ASSAY THYROID STIM HORMONE: CPT | Performed by: HOSPITALIST

## 2025-07-29 RX ORDER — FUROSEMIDE 10 MG/ML
40 INJECTION INTRAMUSCULAR; INTRAVENOUS ONCE
Status: DISCONTINUED | OUTPATIENT
Start: 2025-07-29 | End: 2025-07-29

## 2025-07-29 RX ORDER — POTASSIUM CHLORIDE 1500 MG/1
40 TABLET, EXTENDED RELEASE ORAL EVERY 4 HOURS
Status: COMPLETED | OUTPATIENT
Start: 2025-07-29 | End: 2025-07-29

## 2025-07-29 RX ORDER — DILTIAZEM HYDROCHLORIDE 120 MG/1
120 CAPSULE, EXTENDED RELEASE ORAL DAILY
Status: DISCONTINUED | OUTPATIENT
Start: 2025-07-29 | End: 2025-07-30

## 2025-07-29 RX ORDER — MAGNESIUM OXIDE 400 MG/1
800 TABLET ORAL ONCE
Status: COMPLETED | OUTPATIENT
Start: 2025-07-29 | End: 2025-07-29

## 2025-07-29 RX ORDER — MAGNESIUM SULFATE HEPTAHYDRATE 40 MG/ML
2 INJECTION, SOLUTION INTRAVENOUS ONCE
Status: COMPLETED | OUTPATIENT
Start: 2025-07-29 | End: 2025-07-29

## 2025-07-30 VITALS
BODY MASS INDEX: 22.32 KG/M2 | OXYGEN SATURATION: 99 % | SYSTOLIC BLOOD PRESSURE: 120 MMHG | RESPIRATION RATE: 21 BRPM | HEART RATE: 73 BPM | HEIGHT: 67 IN | DIASTOLIC BLOOD PRESSURE: 73 MMHG | WEIGHT: 142.19 LBS | TEMPERATURE: 98 F

## 2025-07-30 LAB
ANION GAP SERPL CALC-SCNC: 10 MMOL/L (ref 0–18)
BUN BLD-MCNC: 17 MG/DL (ref 9–23)
CALCIUM BLD-MCNC: 8.6 MG/DL (ref 8.7–10.6)
CHLORIDE SERPL-SCNC: 105 MMOL/L (ref 98–112)
CO2 SERPL-SCNC: 27 MMOL/L (ref 21–32)
CREAT BLD-MCNC: 1.21 MG/DL (ref 0.7–1.3)
DEPRECATED HBV CORE AB SER IA-ACNC: 298 NG/ML (ref 50–336)
EGFRCR SERPLBLD CKD-EPI 2021: 59 ML/MIN/1.73M2 (ref 60–?)
ERYTHROCYTE [DISTWIDTH] IN BLOOD BY AUTOMATED COUNT: 15.7 %
GLUCOSE BLD-MCNC: 205 MG/DL (ref 70–99)
GLUCOSE BLD-MCNC: 221 MG/DL (ref 70–99)
GLUCOSE BLD-MCNC: 262 MG/DL (ref 70–99)
HCT VFR BLD AUTO: 31.6 % (ref 39–53)
HGB BLD-MCNC: 10.3 G/DL (ref 13–17.5)
IRON SATN MFR SERPL: 12 % (ref 20–50)
IRON SERPL-MCNC: 24 UG/DL (ref 65–175)
MAGNESIUM SERPL-MCNC: 1.9 MG/DL (ref 1.6–2.6)
MCH RBC QN AUTO: 27.7 PG (ref 26–34)
MCHC RBC AUTO-ENTMCNC: 32.6 G/DL (ref 31–37)
MCV RBC AUTO: 84.9 FL (ref 80–100)
OSMOLALITY SERPL CALC.SUM OF ELEC: 301 MOSM/KG (ref 275–295)
PLATELET # BLD AUTO: 203 10(3)UL (ref 150–450)
POTASSIUM SERPL-SCNC: 3.8 MMOL/L (ref 3.5–5.1)
RBC # BLD AUTO: 3.72 X10(6)UL (ref 3.8–5.8)
SODIUM SERPL-SCNC: 142 MMOL/L (ref 136–145)
TOTAL IRON BINDING CAPACITY: 208 UG/DL (ref 250–425)
TRANSFERRIN SERPL-MCNC: 145 MG/DL (ref 215–365)
WBC # BLD AUTO: 4.8 X10(3) UL (ref 4–11)

## 2025-07-30 PROCEDURE — 82728 ASSAY OF FERRITIN: CPT | Performed by: NURSE PRACTITIONER

## 2025-07-30 PROCEDURE — 82962 GLUCOSE BLOOD TEST: CPT

## 2025-07-30 PROCEDURE — 83540 ASSAY OF IRON: CPT | Performed by: NURSE PRACTITIONER

## 2025-07-30 PROCEDURE — 80048 BASIC METABOLIC PNL TOTAL CA: CPT | Performed by: HOSPITALIST

## 2025-07-30 PROCEDURE — 83735 ASSAY OF MAGNESIUM: CPT | Performed by: HOSPITALIST

## 2025-07-30 PROCEDURE — 85027 COMPLETE CBC AUTOMATED: CPT | Performed by: HOSPITALIST

## 2025-07-30 PROCEDURE — 83550 IRON BINDING TEST: CPT | Performed by: NURSE PRACTITIONER

## 2025-07-30 RX ORDER — POTASSIUM CHLORIDE 1500 MG/1
40 TABLET, EXTENDED RELEASE ORAL ONCE
Status: COMPLETED | OUTPATIENT
Start: 2025-07-30 | End: 2025-07-30

## 2025-07-30 RX ORDER — DILTIAZEM HYDROCHLORIDE 120 MG/1
120 CAPSULE, COATED, EXTENDED RELEASE ORAL
Qty: 60 CAPSULE | Refills: 3 | Status: SHIPPED | OUTPATIENT
Start: 2025-07-30

## 2025-08-21 ENCOUNTER — HOSPITAL ENCOUNTER (INPATIENT)
Facility: HOSPITAL | Age: 85
LOS: 3 days | Discharge: HOME | End: 2025-08-24
Attending: EMERGENCY MEDICINE | Admitting: HOSPITALIST

## 2025-08-21 ENCOUNTER — APPOINTMENT (OUTPATIENT)
Dept: GENERAL RADIOLOGY | Facility: HOSPITAL | Age: 85
End: 2025-08-21
Attending: EMERGENCY MEDICINE

## 2025-08-21 ENCOUNTER — APPOINTMENT (OUTPATIENT)
Dept: CT IMAGING | Facility: HOSPITAL | Age: 85
End: 2025-08-21
Attending: EMERGENCY MEDICINE

## 2025-08-21 DIAGNOSIS — I50.9 ACUTE CONGESTIVE HEART FAILURE, UNSPECIFIED HEART FAILURE TYPE (HCC): ICD-10-CM

## 2025-08-21 DIAGNOSIS — I48.91 ATRIAL FIBRILLATION WITH RAPID VENTRICULAR RESPONSE (HCC): ICD-10-CM

## 2025-08-21 DIAGNOSIS — I21.4 NSTEMI (NON-ST ELEVATED MYOCARDIAL INFARCTION) (HCC): Primary | ICD-10-CM

## 2025-08-21 LAB
ALBUMIN SERPL-MCNC: 4.3 G/DL (ref 3.2–4.8)
ALBUMIN/GLOB SERPL: 1.7 (ref 1–2)
ALP LIVER SERPL-CCNC: 93 U/L (ref 45–117)
ALT SERPL-CCNC: 11 U/L (ref 10–49)
ANION GAP SERPL CALC-SCNC: 10 MMOL/L (ref 0–18)
APTT PPP: 28.1 SECONDS (ref 23–36)
AST SERPL-CCNC: 18 U/L (ref ?–34)
BASOPHILS # BLD AUTO: 0.06 X10(3) UL (ref 0–0.2)
BASOPHILS NFR BLD AUTO: 0.6 %
BILIRUB SERPL-MCNC: 1 MG/DL (ref 0.2–1.1)
BUN BLD-MCNC: 18 MG/DL (ref 9–23)
CALCIUM BLD-MCNC: 9.7 MG/DL (ref 8.7–10.6)
CHLORIDE SERPL-SCNC: 101 MMOL/L (ref 98–112)
CHOLEST SERPL-MCNC: 110 MG/DL (ref ?–200)
CO2 SERPL-SCNC: 25 MMOL/L (ref 21–32)
CREAT BLD-MCNC: 1.3 MG/DL (ref 0.7–1.3)
EGFRCR SERPLBLD CKD-EPI 2021: 54 ML/MIN/1.73M2 (ref 60–?)
EOSINOPHIL # BLD AUTO: 0.1 X10(3) UL (ref 0–0.7)
EOSINOPHIL NFR BLD AUTO: 1 %
ERYTHROCYTE [DISTWIDTH] IN BLOOD BY AUTOMATED COUNT: 16.4 %
EST. AVERAGE GLUCOSE BLD GHB EST-MCNC: 217 MG/DL (ref 68–126)
GLOBULIN PLAS-MCNC: 2.6 G/DL (ref 2–3.5)
GLUCOSE BLD-MCNC: 344 MG/DL (ref 70–99)
GLUCOSE BLD-MCNC: 397 MG/DL (ref 70–99)
HBA1C MFR BLD: 9.2 % (ref ?–5.7)
HCT VFR BLD AUTO: 34.8 % (ref 39–53)
HDLC SERPL-MCNC: 41 MG/DL (ref 40–59)
HGB BLD-MCNC: 11.6 G/DL (ref 13–17.5)
IMM GRANULOCYTES # BLD AUTO: 0.05 X10(3) UL (ref 0–1)
IMM GRANULOCYTES NFR BLD: 0.5 %
LDLC SERPL CALC-MCNC: 41 MG/DL (ref ?–100)
LYMPHOCYTES # BLD AUTO: 1.45 X10(3) UL (ref 1–4)
LYMPHOCYTES NFR BLD AUTO: 14.8 %
MAGNESIUM SERPL-MCNC: 1.8 MG/DL (ref 1.6–2.6)
MCH RBC QN AUTO: 27.9 PG (ref 26–34)
MCHC RBC AUTO-ENTMCNC: 33.3 G/DL (ref 31–37)
MCV RBC AUTO: 83.7 FL (ref 80–100)
MONOCYTES # BLD AUTO: 0.93 X10(3) UL (ref 0.1–1)
MONOCYTES NFR BLD AUTO: 9.5 %
NEUTROPHILS # BLD AUTO: 7.18 X10 (3) UL (ref 1.5–7.7)
NEUTROPHILS # BLD AUTO: 7.18 X10(3) UL (ref 1.5–7.7)
NEUTROPHILS NFR BLD AUTO: 73.6 %
NONHDLC SERPL-MCNC: 69 MG/DL (ref ?–130)
NT-PROBNP SERPL-MCNC: 9776 PG/ML (ref ?–450)
OSMOLALITY SERPL CALC.SUM OF ELEC: 298 MOSM/KG (ref 275–295)
PLATELET # BLD AUTO: 109 10(3)UL (ref 150–450)
PLATELETS.RETICULATED NFR BLD AUTO: 3.2 % (ref 0–7)
POTASSIUM SERPL-SCNC: 3.9 MMOL/L (ref 3.5–5.1)
PROT SERPL-MCNC: 6.9 G/DL (ref 5.7–8.2)
RBC # BLD AUTO: 4.16 X10(6)UL (ref 3.8–5.8)
SODIUM SERPL-SCNC: 136 MMOL/L (ref 136–145)
TRIGL SERPL-MCNC: 167 MG/DL (ref 30–149)
TROPONIN I SERPL HS-MCNC: 1201 NG/L (ref ?–53)
VLDLC SERPL CALC-MCNC: 23 MG/DL (ref 0–30)
WBC # BLD AUTO: 9.8 X10(3) UL (ref 4–11)

## 2025-08-21 PROCEDURE — 71045 X-RAY EXAM CHEST 1 VIEW: CPT | Performed by: EMERGENCY MEDICINE

## 2025-08-21 PROCEDURE — 70450 CT HEAD/BRAIN W/O DYE: CPT | Performed by: EMERGENCY MEDICINE

## 2025-08-21 RX ORDER — PANTOPRAZOLE SODIUM 40 MG/1
40 TABLET, DELAYED RELEASE ORAL
Status: DISCONTINUED | OUTPATIENT
Start: 2025-08-22 | End: 2025-08-24

## 2025-08-21 RX ORDER — AMITRIPTYLINE HYDROCHLORIDE 10 MG/1
10 TABLET ORAL EVERY EVENING
Status: DISCONTINUED | OUTPATIENT
Start: 2025-08-21 | End: 2025-08-24

## 2025-08-21 RX ORDER — TRAMADOL HYDROCHLORIDE 50 MG/1
50 TABLET ORAL 3 TIMES DAILY PRN
Status: DISCONTINUED | OUTPATIENT
Start: 2025-08-21 | End: 2025-08-24

## 2025-08-21 RX ORDER — ONDANSETRON 2 MG/ML
4 INJECTION INTRAMUSCULAR; INTRAVENOUS EVERY 6 HOURS PRN
Status: DISCONTINUED | OUTPATIENT
Start: 2025-08-21 | End: 2025-08-24

## 2025-08-21 RX ORDER — HEPARIN SODIUM 1000 [USP'U]/ML
60 INJECTION, SOLUTION INTRAVENOUS; SUBCUTANEOUS ONCE
Status: COMPLETED | OUTPATIENT
Start: 2025-08-21 | End: 2025-08-21

## 2025-08-21 RX ORDER — NICOTINE POLACRILEX 4 MG
15 LOZENGE BUCCAL
Status: DISCONTINUED | OUTPATIENT
Start: 2025-08-21 | End: 2025-08-24

## 2025-08-21 RX ORDER — MAGNESIUM OXIDE 400 MG/1
400 TABLET ORAL ONCE
Status: COMPLETED | OUTPATIENT
Start: 2025-08-21 | End: 2025-08-21

## 2025-08-21 RX ORDER — DEXTROSE MONOHYDRATE 25 G/50ML
50 INJECTION, SOLUTION INTRAVENOUS
Status: DISCONTINUED | OUTPATIENT
Start: 2025-08-21 | End: 2025-08-24

## 2025-08-21 RX ORDER — FINASTERIDE 5 MG/1
5 TABLET, FILM COATED ORAL DAILY
Status: DISCONTINUED | OUTPATIENT
Start: 2025-08-21 | End: 2025-08-24

## 2025-08-21 RX ORDER — FUROSEMIDE 10 MG/ML
40 INJECTION INTRAMUSCULAR; INTRAVENOUS ONCE
Status: COMPLETED | OUTPATIENT
Start: 2025-08-21 | End: 2025-08-21

## 2025-08-21 RX ORDER — DOXEPIN HYDROCHLORIDE 50 MG/1
1 CAPSULE ORAL DAILY
Status: DISCONTINUED | OUTPATIENT
Start: 2025-08-21 | End: 2025-08-24

## 2025-08-21 RX ORDER — NICOTINE POLACRILEX 4 MG
30 LOZENGE BUCCAL
Status: DISCONTINUED | OUTPATIENT
Start: 2025-08-21 | End: 2025-08-24

## 2025-08-21 RX ORDER — HEPARIN SODIUM AND DEXTROSE 10000; 5 [USP'U]/100ML; G/100ML
INJECTION INTRAVENOUS CONTINUOUS
Status: DISCONTINUED | OUTPATIENT
Start: 2025-08-22 | End: 2025-08-22

## 2025-08-21 RX ORDER — ATORVASTATIN CALCIUM 40 MG/1
40 TABLET, FILM COATED ORAL NIGHTLY
Status: DISCONTINUED | OUTPATIENT
Start: 2025-08-21 | End: 2025-08-24

## 2025-08-21 RX ORDER — HEPARIN SODIUM AND DEXTROSE 10000; 5 [USP'U]/100ML; G/100ML
12 INJECTION INTRAVENOUS ONCE
Status: COMPLETED | OUTPATIENT
Start: 2025-08-21 | End: 2025-08-22

## 2025-08-21 RX ORDER — MULTIVITAMIN/IRON/FOLIC ACID 18MG-0.4MG
250 TABLET ORAL DAILY
Status: DISCONTINUED | OUTPATIENT
Start: 2025-08-21 | End: 2025-08-24

## 2025-08-21 RX ORDER — INSULIN DEGLUDEC 100 U/ML
12 INJECTION, SOLUTION SUBCUTANEOUS NIGHTLY
Status: DISCONTINUED | OUTPATIENT
Start: 2025-08-21 | End: 2025-08-24

## 2025-08-21 RX ORDER — ACETAMINOPHEN 500 MG
500 TABLET ORAL EVERY 4 HOURS PRN
Status: DISCONTINUED | OUTPATIENT
Start: 2025-08-21 | End: 2025-08-24

## 2025-08-21 RX ORDER — METOCLOPRAMIDE HYDROCHLORIDE 5 MG/ML
5 INJECTION INTRAMUSCULAR; INTRAVENOUS EVERY 8 HOURS PRN
Status: DISCONTINUED | OUTPATIENT
Start: 2025-08-21 | End: 2025-08-24

## 2025-08-21 RX ORDER — METOPROLOL TARTRATE 100 MG/1
100 TABLET ORAL
Status: DISCONTINUED | OUTPATIENT
Start: 2025-08-21 | End: 2025-08-24

## 2025-08-22 PROBLEM — Z51.5 PALLIATIVE CARE ENCOUNTER: Status: ACTIVE | Noted: 2025-08-22

## 2025-08-22 PROBLEM — Z71.89 COUNSELING REGARDING ADVANCE CARE PLANNING AND GOALS OF CARE: Status: ACTIVE | Noted: 2025-08-22

## 2025-08-22 LAB
ANION GAP SERPL CALC-SCNC: 12 MMOL/L (ref 0–18)
APTT PPP: 49.4 SECONDS (ref 23–36)
APTT PPP: 54.5 SECONDS (ref 23–36)
ATRIAL RATE: 375 BPM
BASOPHILS # BLD AUTO: 0.04 X10(3) UL (ref 0–0.2)
BASOPHILS NFR BLD AUTO: 0.6 %
BUN BLD-MCNC: 18 MG/DL (ref 9–23)
CALCIUM BLD-MCNC: 9.4 MG/DL (ref 8.7–10.6)
CHLORIDE SERPL-SCNC: 100 MMOL/L (ref 98–112)
CO2 SERPL-SCNC: 28 MMOL/L (ref 21–32)
CREAT BLD-MCNC: 1.1 MG/DL (ref 0.7–1.3)
EGFRCR SERPLBLD CKD-EPI 2021: 66 ML/MIN/1.73M2 (ref 60–?)
EOSINOPHIL # BLD AUTO: 0.12 X10(3) UL (ref 0–0.7)
EOSINOPHIL NFR BLD AUTO: 1.7 %
ERYTHROCYTE [DISTWIDTH] IN BLOOD BY AUTOMATED COUNT: 16.5 %
GLUCOSE BLD-MCNC: 176 MG/DL (ref 70–99)
GLUCOSE BLD-MCNC: 181 MG/DL (ref 70–99)
GLUCOSE BLD-MCNC: 196 MG/DL (ref 70–99)
GLUCOSE BLD-MCNC: 222 MG/DL (ref 70–99)
GLUCOSE BLD-MCNC: 222 MG/DL (ref 70–99)
GLUCOSE BLD-MCNC: 252 MG/DL (ref 70–99)
HCT VFR BLD AUTO: 29.3 % (ref 39–53)
HGB BLD-MCNC: 10.1 G/DL (ref 13–17.5)
IMM GRANULOCYTES # BLD AUTO: 0.03 X10(3) UL (ref 0–1)
IMM GRANULOCYTES NFR BLD: 0.4 %
LYMPHOCYTES # BLD AUTO: 1.27 X10(3) UL (ref 1–4)
LYMPHOCYTES NFR BLD AUTO: 17.5 %
MAGNESIUM SERPL-MCNC: 1.6 MG/DL (ref 1.6–2.6)
MCH RBC QN AUTO: 28.5 PG (ref 26–34)
MCHC RBC AUTO-ENTMCNC: 34.5 G/DL (ref 31–37)
MCV RBC AUTO: 82.5 FL (ref 80–100)
MONOCYTES # BLD AUTO: 0.85 X10(3) UL (ref 0.1–1)
MONOCYTES NFR BLD AUTO: 11.7 %
NEUTROPHILS # BLD AUTO: 4.96 X10 (3) UL (ref 1.5–7.7)
NEUTROPHILS # BLD AUTO: 4.96 X10(3) UL (ref 1.5–7.7)
NEUTROPHILS NFR BLD AUTO: 68.1 %
OSMOLALITY SERPL CALC.SUM OF ELEC: 299 MOSM/KG (ref 275–295)
PLATELET # BLD AUTO: 79 10(3)UL (ref 150–450)
PLATELETS.RETICULATED NFR BLD AUTO: 3.3 % (ref 0–7)
POTASSIUM SERPL-SCNC: 3 MMOL/L (ref 3.5–5.1)
POTASSIUM SERPL-SCNC: 4.4 MMOL/L (ref 3.5–5.1)
Q-T INTERVAL: 246 MS
Q-T INTERVAL: 318 MS
Q-T INTERVAL: 408 MS
QRS DURATION: 102 MS
QRS DURATION: 104 MS
QRS DURATION: 90 MS
QTC CALCULATION (BEZET): 425 MS
QTC CALCULATION (BEZET): 443 MS
QTC CALCULATION (BEZET): 479 MS
R AXIS: 38 DEGREES
R AXIS: 5 DEGREES
R AXIS: 8 DEGREES
RBC # BLD AUTO: 3.55 X10(6)UL (ref 3.8–5.8)
SODIUM SERPL-SCNC: 140 MMOL/L (ref 136–145)
T AXIS: -27 DEGREES
T AXIS: 14 DEGREES
T AXIS: 256 DEGREES
TROPONIN I SERPL HS-MCNC: 1235 NG/L (ref ?–53)
VENTRICULAR RATE: 117 BPM
VENTRICULAR RATE: 180 BPM
VENTRICULAR RATE: 83 BPM
WBC # BLD AUTO: 7.3 X10(3) UL (ref 4–11)

## 2025-08-22 PROCEDURE — 99222 1ST HOSP IP/OBS MODERATE 55: CPT | Performed by: NURSE PRACTITIONER

## 2025-08-22 PROCEDURE — 99497 ADVNCD CARE PLAN 30 MIN: CPT | Performed by: NURSE PRACTITIONER

## 2025-08-22 RX ORDER — DILTIAZEM HYDROCHLORIDE 120 MG/1
120 CAPSULE, EXTENDED RELEASE ORAL DAILY
Status: DISCONTINUED | OUTPATIENT
Start: 2025-08-22 | End: 2025-08-24

## 2025-08-22 RX ORDER — MAGNESIUM OXIDE 400 MG/1
400 TABLET ORAL ONCE
Status: COMPLETED | OUTPATIENT
Start: 2025-08-22 | End: 2025-08-22

## 2025-08-22 RX ORDER — DILTIAZEM HYDROCHLORIDE 120 MG/1
120 CAPSULE, EXTENDED RELEASE ORAL
Status: DISCONTINUED | OUTPATIENT
Start: 2025-08-22 | End: 2025-08-22

## 2025-08-22 RX ORDER — DILTIAZEM HYDROCHLORIDE 120 MG/1
120 CAPSULE, EXTENDED RELEASE ORAL DAILY
Status: DISCONTINUED | OUTPATIENT
Start: 2025-08-22 | End: 2025-08-22

## 2025-08-22 RX ORDER — POTASSIUM CHLORIDE 1500 MG/1
40 TABLET, EXTENDED RELEASE ORAL EVERY 4 HOURS
Status: COMPLETED | OUTPATIENT
Start: 2025-08-22 | End: 2025-08-22

## 2025-08-23 LAB
ALBUMIN SERPL-MCNC: 3.5 G/DL (ref 3.2–4.8)
ALBUMIN/GLOB SERPL: 1.6 (ref 1–2)
ALP LIVER SERPL-CCNC: 76 U/L (ref 45–117)
ALT SERPL-CCNC: 8 U/L (ref 10–49)
ANION GAP SERPL CALC-SCNC: 12 MMOL/L (ref 0–18)
AST SERPL-CCNC: 13 U/L (ref ?–34)
BASOPHILS # BLD AUTO: 0.04 X10(3) UL (ref 0–0.2)
BASOPHILS NFR BLD AUTO: 0.6 %
BILIRUB SERPL-MCNC: 0.6 MG/DL (ref 0.2–1.1)
BUN BLD-MCNC: 19 MG/DL (ref 9–23)
CALCIUM BLD-MCNC: 9.2 MG/DL (ref 8.7–10.6)
CHLORIDE SERPL-SCNC: 103 MMOL/L (ref 98–112)
CO2 SERPL-SCNC: 27 MMOL/L (ref 21–32)
CREAT BLD-MCNC: 1.06 MG/DL (ref 0.7–1.3)
EGFRCR SERPLBLD CKD-EPI 2021: 69 ML/MIN/1.73M2 (ref 60–?)
EOSINOPHIL # BLD AUTO: 0.24 X10(3) UL (ref 0–0.7)
EOSINOPHIL NFR BLD AUTO: 3.8 %
ERYTHROCYTE [DISTWIDTH] IN BLOOD BY AUTOMATED COUNT: 16.8 %
GLOBULIN PLAS-MCNC: 2.2 G/DL (ref 2–3.5)
GLUCOSE BLD-MCNC: 130 MG/DL (ref 70–99)
GLUCOSE BLD-MCNC: 131 MG/DL (ref 70–99)
GLUCOSE BLD-MCNC: 140 MG/DL (ref 70–99)
GLUCOSE BLD-MCNC: 169 MG/DL (ref 70–99)
GLUCOSE BLD-MCNC: 229 MG/DL (ref 70–99)
HCT VFR BLD AUTO: 30.6 % (ref 39–53)
HGB BLD-MCNC: 10.1 G/DL (ref 13–17.5)
IMM GRANULOCYTES # BLD AUTO: 0.03 X10(3) UL (ref 0–1)
IMM GRANULOCYTES NFR BLD: 0.5 %
LYMPHOCYTES # BLD AUTO: 1.36 X10(3) UL (ref 1–4)
LYMPHOCYTES NFR BLD AUTO: 21.6 %
MAGNESIUM SERPL-MCNC: 1.8 MG/DL (ref 1.6–2.6)
MCH RBC QN AUTO: 28 PG (ref 26–34)
MCHC RBC AUTO-ENTMCNC: 33 G/DL (ref 31–37)
MCV RBC AUTO: 84.8 FL (ref 80–100)
MONOCYTES # BLD AUTO: 0.66 X10(3) UL (ref 0.1–1)
MONOCYTES NFR BLD AUTO: 10.5 %
NEUTROPHILS # BLD AUTO: 3.97 X10 (3) UL (ref 1.5–7.7)
NEUTROPHILS # BLD AUTO: 3.97 X10(3) UL (ref 1.5–7.7)
NEUTROPHILS NFR BLD AUTO: 63 %
OSMOLALITY SERPL CALC.SUM OF ELEC: 298 MOSM/KG (ref 275–295)
PLATELET # BLD AUTO: 81 10(3)UL (ref 150–450)
PLATELETS.RETICULATED NFR BLD AUTO: 3.4 % (ref 0–7)
POTASSIUM SERPL-SCNC: 3.8 MMOL/L (ref 3.5–5.1)
PROT SERPL-MCNC: 5.7 G/DL (ref 5.7–8.2)
RBC # BLD AUTO: 3.61 X10(6)UL (ref 3.8–5.8)
SODIUM SERPL-SCNC: 142 MMOL/L (ref 136–145)
WBC # BLD AUTO: 6.3 X10(3) UL (ref 4–11)

## 2025-08-23 RX ORDER — POTASSIUM CHLORIDE 1500 MG/1
40 TABLET, EXTENDED RELEASE ORAL ONCE
Status: COMPLETED | OUTPATIENT
Start: 2025-08-23 | End: 2025-08-23

## 2025-08-23 RX ORDER — MAGNESIUM OXIDE 400 MG/1
400 TABLET ORAL ONCE
Status: DISCONTINUED | OUTPATIENT
Start: 2025-08-23 | End: 2025-08-24

## 2025-08-23 RX ORDER — MIDODRINE HYDROCHLORIDE 5 MG/1
5 TABLET ORAL 3 TIMES DAILY
Status: DISCONTINUED | OUTPATIENT
Start: 2025-08-23 | End: 2025-08-24

## 2025-08-24 VITALS
TEMPERATURE: 97 F | OXYGEN SATURATION: 83 % | BODY MASS INDEX: 22.11 KG/M2 | RESPIRATION RATE: 20 BRPM | DIASTOLIC BLOOD PRESSURE: 72 MMHG | SYSTOLIC BLOOD PRESSURE: 127 MMHG | WEIGHT: 140.88 LBS | HEIGHT: 67 IN | HEART RATE: 101 BPM

## 2025-08-24 LAB
ANION GAP SERPL CALC-SCNC: 11 MMOL/L (ref 0–18)
BASOPHILS # BLD AUTO: 0.05 X10(3) UL (ref 0–0.2)
BASOPHILS NFR BLD AUTO: 0.7 %
BUN BLD-MCNC: 16 MG/DL (ref 9–23)
CALCIUM BLD-MCNC: 9.3 MG/DL (ref 8.7–10.6)
CHLORIDE SERPL-SCNC: 103 MMOL/L (ref 98–112)
CO2 SERPL-SCNC: 25 MMOL/L (ref 21–32)
CREAT BLD-MCNC: 1.16 MG/DL (ref 0.7–1.3)
EGFRCR SERPLBLD CKD-EPI 2021: 62 ML/MIN/1.73M2 (ref 60–?)
EOSINOPHIL # BLD AUTO: 0.23 X10(3) UL (ref 0–0.7)
EOSINOPHIL NFR BLD AUTO: 3.3 %
ERYTHROCYTE [DISTWIDTH] IN BLOOD BY AUTOMATED COUNT: 16.6 %
GLUCOSE BLD-MCNC: 193 MG/DL (ref 70–99)
GLUCOSE BLD-MCNC: 205 MG/DL (ref 70–99)
GLUCOSE BLD-MCNC: 382 MG/DL (ref 70–99)
HCT VFR BLD AUTO: 30.9 % (ref 39–53)
HGB BLD-MCNC: 10.2 G/DL (ref 13–17.5)
IMM GRANULOCYTES # BLD AUTO: 0.03 X10(3) UL (ref 0–1)
IMM GRANULOCYTES NFR BLD: 0.4 %
LYMPHOCYTES # BLD AUTO: 1.55 X10(3) UL (ref 1–4)
LYMPHOCYTES NFR BLD AUTO: 22.1 %
MAGNESIUM SERPL-MCNC: 1.7 MG/DL (ref 1.6–2.6)
MCH RBC QN AUTO: 27.9 PG (ref 26–34)
MCHC RBC AUTO-ENTMCNC: 33 G/DL (ref 31–37)
MCV RBC AUTO: 84.7 FL (ref 80–100)
MONOCYTES # BLD AUTO: 0.72 X10(3) UL (ref 0.1–1)
MONOCYTES NFR BLD AUTO: 10.3 %
NEUTROPHILS # BLD AUTO: 4.42 X10 (3) UL (ref 1.5–7.7)
NEUTROPHILS # BLD AUTO: 4.42 X10(3) UL (ref 1.5–7.7)
NEUTROPHILS NFR BLD AUTO: 63.2 %
OSMOLALITY SERPL CALC.SUM OF ELEC: 294 MOSM/KG (ref 275–295)
PLATELET # BLD AUTO: 79 10(3)UL (ref 150–450)
PLATELETS.RETICULATED NFR BLD AUTO: 2.5 % (ref 0–7)
POTASSIUM SERPL-SCNC: 4.6 MMOL/L (ref 3.5–5.1)
POTASSIUM SERPL-SCNC: 4.6 MMOL/L (ref 3.5–5.1)
RBC # BLD AUTO: 3.65 X10(6)UL (ref 3.8–5.8)
SODIUM SERPL-SCNC: 139 MMOL/L (ref 136–145)
WBC # BLD AUTO: 7 X10(3) UL (ref 4–11)

## 2025-08-24 RX ORDER — MIDODRINE HYDROCHLORIDE 5 MG/1
5 TABLET ORAL 3 TIMES DAILY
Qty: 90 TABLET | Refills: 1 | Status: SHIPPED | OUTPATIENT
Start: 2025-08-24

## 2025-08-25 ENCOUNTER — TELEPHONE (OUTPATIENT)
Dept: PALLIATIVE CARE | Facility: CLINIC | Age: 85
End: 2025-08-25

## 2025-08-26 ENCOUNTER — PATIENT OUTREACH (OUTPATIENT)
Age: 85
End: 2025-08-26

## 2025-08-28 RX ORDER — CHOLECALCIFEROL (VITAMIN D3) 50 MCG
1 TABLET ORAL DAILY
COMMUNITY

## 2025-08-28 RX ORDER — PHENOL 1.4 %
600 AEROSOL, SPRAY (ML) MUCOUS MEMBRANE DAILY
COMMUNITY

## (undated) DEVICE — FIBER LSR 550 MIC 550 DL DISP FOR HOLM LSR

## (undated) DEVICE — SOLUTION IV 1000ML 0.9% NACL PRESERVATIVE

## (undated) DEVICE — 10FT COMBINED O2 DELIVERY/CO2 MONITORING. FILTER WITH MICROSTREAM TYPE LUER: Brand: DUAL ADULT NASAL CANNULA

## (undated) DEVICE — SYRINGE,TOOMEY,IRRIGATION,70CC,STERILE: Brand: MEDLINE

## (undated) DEVICE — 3M™ RED DOT™ MONITORING ELECTRODE WITH FOAM TAPE AND STICKY GEL, 50/BAG, 20/CASE, 72/PLT 2570: Brand: RED DOT™

## (undated) DEVICE — V2 SPECIMEN COLLECTION MANIFOLD KIT: Brand: NEPTUNE

## (undated) DEVICE — ENDOSCOPY PACK UPPER: Brand: MEDLINE INDUSTRIES, INC.

## (undated) DEVICE — HF-RESECTION ELECTRODE PLASMALOOP LOOP, MEDIUM, 24 FR., 12°/16°, ESG TURIS: Brand: OLYMPUS

## (undated) DEVICE — PACK PBDS CYSTOSCOPY

## (undated) DEVICE — REM POLYHESIVE ADULT PATIENT RETURN ELECTRODE: Brand: VALLEYLAB

## (undated) DEVICE — KIT CUSTOM ENDOPROCEDURE STERIS

## (undated) DEVICE — ECHOTIP ULTRA: Brand: ECHOTIP

## (undated) DEVICE — NEEDLE INJ 25GA CATH 230CM CHN 2.8MM ACUJECT

## (undated) DEVICE — 3M™ RED DOT™ MONITORING ELECTRODE WITH FOAM TAPE AND STICKY GEL 2570-3, 3/BAG, 200/CASE, 54/PLT: Brand: RED DOT™

## (undated) DEVICE — BALLOON HEMOSTATIC EUS LINEAR

## (undated) DEVICE — 1200CC GUARDIAN II: Brand: GUARDIAN

## (undated) DEVICE — SYRINGE MED 30ML STD CLR PLAS LL TIP N CTRL

## (undated) DEVICE — SOLUTION IRRIG 1000ML ST H2O AQUALITE PLAS

## (undated) DEVICE — SLEEVE COMPR MD KNEE LEN SGL USE KENDALL SCD

## (undated) DEVICE — BITEBLOCK ENDOSCP 60FR MAXI STRP

## (undated) DEVICE — GLOVE,SURG,SENSICARE SLT,LF,PF,6.5: Brand: MEDLINE

## (undated) DEVICE — FIAPC® PROBE W/ FILTER 2200 C OD 2.3MM/6.9FR; L 2.2M/7.2FT: Brand: ERBE

## (undated) DEVICE — VALVE AIR/H20 DEFENDO SCT BX

## (undated) DEVICE — KIT VLV 5 PC AIR H2O SUCT BX ENDOGATOR CONN

## (undated) DEVICE — FORCEP RADIAL JAW 4

## (undated) DEVICE — BAG,DRAINAGE,4L,A/R TOWER,METAL CLAMP: Brand: MEDLINE

## (undated) DEVICE — HF-RESECTION ELECTRODE PLASMA-OVALBUTTON BUTTON, OVAL, 24 FR., 12°-30°, ESG TURIS: Brand: OLYMPUS

## (undated) DEVICE — HEMOSPRAY ENDOSCOPIC HEMOSTAT: Brand: HEMOSPRAY

## (undated) DEVICE — GLOVE,SURG,SENSICARE,ALOE,LF,PF,7: Brand: MEDLINE

## (undated) DEVICE — FILTERLINE NASAL ADULT O2/CO2

## (undated) DEVICE — Device

## (undated) DEVICE — SOLUTION IRRIG 3000ML 0.9% NACL FLX CONT

## (undated) NOTE — LETTER
[facility]  [address] [facility] [address]  [phone]    Blood Transfusion Consent    In the course of your treatment, it may become necessary to administer a transfusion of blood or blood components. This form provides basic information concerning this procedure and, if signed by you, authorizes its administration. By signing this form, you agree that all of your questions about the administration of blood or blood products have been answered by the ordering medical professional or designee.    Description of Procedure  Blood is introduced into one of your veins, commonly in the arm, using a sterilized disposable needle. The amount of blood transfused, and whether the transfusion will be of blood or blood components is a judgement the physician will make based on your particular needs.    Risks  The transfusion is a common procedure of low risk.  MINOR AND TEMPORARY REACTIONS ARE NOT UNCOMMON, including a slight bruise, swelling or local reaction in the area where the needle pierces your skin, or a nonserious reaction to the transfused material itself, including headache, fever or mild skin reaction, such as rash.  Serious reactions are possible, though very unlikely, and include severe allergic reaction (shock) and destruction (hemolysis) of transfused blood cells.  Infectious diseases which are known to be transmitted by blood transfusion include certain types of viral Hepatitis(liver infection from a virus), Human Immunodeficiency Virus (HIV-1,2) infection, a viral infection known to cause Acquired Immunodeficiency Syndrome (AIDS), as well as certain other bacterial, viral, and parasitic diseases. While a minimal risk of acquiring an infectious disease from transfused blood exists, in accordance with the Federal and State law, all due care has been taken in donor selection and testing to avoid transmission of disease.    Alternatives  If loss of blood poses serious threats during your  treatment, THERE IS NO EFFECTIVE ALTERNATIVE TO BLOOD TRANSFUSION. However, if you have any further questions on this matter, your provider will fully explain the alternatives to you if it has not already been done.    I, ______________________________, have read/had read to me the above. I understand the matters bearing on the decision whether or not to authorize a transfusion of blood or blood components. I have no questions which have not been answered to my full satisfaction. I hereby consent to such transfusion as my physician may deem necessary or advisable in the course of my treatment.    ______________________________________________                    ___________________________  (Signature of Patient or Responsible party in case of minor,                 (Printed Name of Patient or incompetent, or unconscious patient)              Responsible Party)    ___________________________               _____________________  (Relationship to Patient if not self)                                    (Date and Time)    __________________________                                                           ______________________              (Signature of Witness)               (Printed Name of Witness)     Language line ()    Telephone/Verbal/Video Consent    __________________________                     ____________________  (Signature of 2nd Witness           (Printed Name of 2nd  Telephone/Verbal/Video Consent)           Witness)    Patient Name: Shaq Song     : 10/2/1940                 Printed: May 20, 2025     Medical Record #: [id number]      Rev: 2023

## (undated) NOTE — LETTER
CLARIFICATION FOR E-SSS    To: surgery schedulers    Patient Name: Shaq Song DOB-Age / Sex: 10/2/1940-A: 84 y  male   Medical Records: [id number] [facility]: [id number]      Procedure Description:  CYSTOSCOPY, CYSTOLITHOPAXY WITH HOLMIUM LASER, TRANSURETHRAL RESECTION OF PROSTATE    Please note that clarification is needed on the Electronic-Surgery Scheduling Sheet (E-SSS)  the original is included with this letter. Please have physician review and make changes on the faxed copy of the E-SSS.      Per E-SSS admit type is  PLEASE DAVID ADMIT TYPE IN SSS    Please review and submit change if needed        ALL CHANGES MUST BE DOCUMENTED ON THE E-SSS AND SIGNED BY THE PHYSICIAN    After physician has made the changes and signed the E-SSS please fax it to [phone] and these changes will then be made in Epic by the OR schedulers.     If you have any questions please call Pre-Admission Testing at [phone]    Thank you

## (undated) NOTE — LETTER
[facility]  Yannmonica Dotsonkatrin 61 [address] [facility], [address] [place] [address]    Consent for Operation    Date: __________________    Time: _______________    1.  I authorize the performance upon Ekta Mcdonald the following operation:    Procedure(s):  ENDOSCOPIC ULTRASOU procedure has been videotaped, the surgeon will obtain the original videotape. The hospital will not be responsible for storage or maintenance of this tape.     6. For the purpose of advancing medical education, I consent to the admittance of observers to t STATEMENTS REQUIRING INSERTION OR COMPLETION WERE FILLED IN.     Signature of Patient:   ___________________________    When the patient is a minor or mentally incompetent to give consent:  Signature of person authorized to consent for patient: ____________ supplements, and pills I can buy without a prescription (including street drugs/illegal medications). Failure to inform my anesthesiologist about these medicines may increase my risk of anesthetic complications.   · If I am allergic to anything or have had Anesthesiologist Signature     Date   Time  I have discussed the procedure and information above with the patient (or patient’s representative) and answered their questions. The patient or their representative has agreed to have anesthesia services.     ___

## (undated) NOTE — LETTER
[facility] [address], [address]  Consent for Procedure/Sedation    Date:        Time:       1. I authorize the performance upon Pepe Villanueva the following:  Transesophageal Echocardiogram     2.  I authorize Dr. Rigo Bradshaw (and wh 10/2/1940       Medical Record #: [id number]

## (undated) NOTE — LETTER
[address]  [address]  Authorization for Surgical Operation and Procedure     Date:___________                                                                                                         Time:__________  I hereby authorize Surgeon(s):  Robby Lucia DO, my physician and his/her assistants (if applicable), which may include medical students, residents, and/or fellows, to perform the following surgical operation/ procedure and administer such anesthesia as may be determined necessary by my physician:  Operation/Procedure name (s) Procedure(s):  ESOPHAGOGASTRODUODENOSCOPY (EGD) on Shaq DIAZ Juarezsintia   2.   I recognize that during the surgical operation/procedure, unforeseen conditions may necessitate additional or different procedures than those listed above.  I, therefore, further authorize and request that the above-named surgeon, assistants, or designees perform such procedures as are, in their judgment, necessary and desirable.    3.   My surgeon/physician has discussed prior to my surgery the potential benefits, risks and side effects of this procedure; the likelihood of achieving goals; and potential problems that might occur during recuperation.  They also discussed reasonable alternatives to the procedure, including risks, benefits, and side effects related to the alternatives and risks related to not receiving this procedure.  I have had all my questions answered and I acknowledge that no guarantee has been made as to the result that may be obtained.    4.   Should the need arise during my operation/procedure, which includes change of level of care prior to discharge, I also consent to the administration of blood and/or blood products.  Further, I understand that despite careful testing and screening of blood or blood products by collecting agencies, I may still be subject to ill effects as a result of receiving a blood transfusion and/or blood products.   The following are some, but not all, of the potential risks that can occur: fever and allergic reactions, hemolytic reactions, transmission of diseases such as Hepatitis, AIDS and Cytomegalovirus (CMV) and fluid overload.  In the event that I wish to have an autologous transfusion of my own blood, or a directed donor transfusion, I will discuss this with my physician.  Check only if Refusing Blood or Blood Products  I understand refusal of blood or blood products as deemed necessary by my physician may have serious consequences to my condition to include possible death. I hereby assume responsibility for my refusal and release the hospital, its personnel, and my physicians from any responsibility for the consequences of my refusal.          o  Refuse      5.   I authorize the use of any specimen, organs, tissues, body parts or foreign objects that may be removed from my body during the operation/procedure for diagnosis, research or teaching purposes and their subsequent disposal by hospital authorities.  I also authorize the release of specimen test results and/or written reports to my treating physician on the hospital medical staff or other referring or consulting physicians involved in my care, at the discretion of the Pathologist or my treating physician.    6.   I consent to the photographing or videotaping of the operations or procedures to be performed, including appropriate portions of my body for medical, scientific, or educational purposes, provided my identity is not revealed by the pictures or by descriptive texts accompanying them.  If the procedure has been photographed/videotaped, the surgeon will obtain the original picture, image, videotape or CD.  The hospital will not be responsible for storage, release or maintenance of the picture, image, tape or CD.    7.   I consent to the presence of a  or observers in the operating room as deemed necessary by my physician or their  designees.    8.   I recognize that in the event my procedure results in extended X-Ray/fluoroscopy time, I may develop a skin reaction.    9. If I have a Do Not Attempt Resuscitation (DNAR) order in place, that status will be suspended while in the operating room, procedural suite, and during the recovery period unless otherwise explicitly stated by me (or a person authorized to consent on my behalf). The surgeon or my attending physician will determine when the applicable recovery period ends for purposes of reinstating the DNAR order.  10. Patients having a sterilization procedure: I understand that if the procedure is successful the results will be permanent and it will therefore be impossible for me to inseminate, conceive, or bear children.  I also understand that the procedure is intended to result in sterility, although the result has not been guaranteed.   11. I acknowledge that my physician has explained sedation/analgesia administration to me including the risk and benefits I consent to the administration of sedation/analgesia as may be necessary or desirable in the judgment of my physician.    I CERTIFY THAT I HAVE READ AND FULLY UNDERSTAND THE ABOVE CONSENT TO OPERATION and/or OTHER PROCEDURE.    _________________________________________  __________________________________  Signature of Patient     Signature of Responsible Person         ___________________________________         Printed Name of Responsible Person           _________________________________                 Relationship to Patient  _________________________________________  ______________________________  Signature of Witness          Date  Time      Patient Name: Shaq DIAZ Duncan     : 10/2/1940                 Printed: May 20, 2025     Medical Record #: [id number]                     Page [address] of [address] [place] [address]  [address]    Consent for Anesthesia    IShaq  Duncan agree to be cared for by an anesthesiologist, who is specially trained to monitor me and give me medicine to put me to sleep or keep me comfortable during my procedure    I understand that my anesthesiologist is not an employee or agent of [facility] or [employer]. He or she works for [employer].    As the patient asking for anesthesia services, I agree to:  Allow the anesthesiologist (anesthesia doctor) to give me medicine and do additional procedures as necessary. Some examples are: Starting or using an “IV” to give me medicine, fluids or blood during my procedure, and having a breathing tube placed to help me breathe when I’m asleep (intubation). In the event that my heart stops working properly, I understand that my anesthesiologist will make every effort to sustain my life, unless otherwise directed by [facility] Do Not Resuscitate documents.  Tell my anesthesia doctor before my procedure:  If I am pregnant.  The last time that I ate or drank.  All of the medicines I take (including prescriptions, herbal supplements, and pills I can buy without a prescription (including street drugs/illegal medications). Failure to inform my anesthesiologist about these medicines may increase my risk of anesthetic complications.  If I am allergic to anything or have had a reaction to anesthesia before.  I understand how the anesthesia medicine will help me (benefits).  I understand that with any type of anesthesia medicine there are risks:  The most common risks are: nausea, vomiting, sore throat, muscle soreness, damage to my eyes, mouth, or teeth (from breathing tube placement).  Rare risks include: remembering what happened during my procedure, allergic reactions to medications, injury to my airway, heart, lungs, vision, nerves, or muscles and in extremely rare instances death.  My doctor has explained to me other choices available to me for my care (alternatives).  Pregnant  Patients (“epidural”):  I understand that the risks of having an epidural (medicine given into my back to help control pain during labor), include itching, low blood pressure, difficulty urinating, headache or slowing of the baby’s heart. Very rare risks include infection, bleeding, seizure, irregular heart rhythms and nerve injury.  Regional Anesthesia (“spinal”, “epidural”, & “nerve blocks”):  I understand that rare but potential complications include headache, bleeding, infection, seizure, irregular heart rhythms, and nerve injury.    I can change my mind about having anesthesia services at any time before I get the medicine.    _____________________________________________________________________________  Patient (or Representative) Signature/Relationship to Patient  Date   Time    _____________________________________________________________________________   Name (if used)    Language/Organization   Time    _____________________________________________________________________________  Anesthesiologist Signature     Date   Time  I have discussed the procedure and information above with the patient (or patient’s representative) and answered their questions. The patient or their representative has agreed to have anesthesia services.    _____________________________________________________________________________  Witness        Date   Time  I have verified that the signature is that of the patient or patient’s representative, and that it was signed before the procedure  Patient Name: Shaq Song     : 10/2/1940                 Printed: May 20, 2025     Medical Record #: [id number]                     Page 2 of 2

## (undated) NOTE — LETTER
OUTSIDE TESTING RESULT REQUEST     IMPORTANT: FOR YOUR IMMEDIATE ATTENTION  Please FAX all test results listed below to: [phone]     Testing already done on or about: 25    * * * * If testing is NOT complete, arrange with patient A.S.A.P. * * * *      Patient Name: Shaq Song  Surgery Date: 2025  Medical Record: [id number]  CSN: [id number]  : 10/2/1940 - A: 84 y     Sex: male  Surgeon(s):  Jose Palencia MD  Procedure: CYSTOSCOPY, CYSTOLITHOPAXY WITH HOLMIUM LASER, TRANSURETHRAL RESECTION OF PROSTATE  Anesthesia Type: General     Surgeon: Jose Palencia MD     The following Testing and Time Line are REQUIRED PER ANESTHESIA     EKG READ AND SIGNED WITHIN   90 days      Thank You,   Sent by:IMTIAZ ROGERS

## (undated) NOTE — LETTER
[address]  [address]  Authorization for Surgical Operation and Procedure     Date:___________                                                                                                         Time:__________  I hereby authorize Surgeon(s):  Abner Woods MD, my physician and his/her assistants (if applicable), which may include medical students, residents, and/or fellows, to perform the following surgical operation/ procedure and administer such anesthesia as may be determined necessary by my physician:  Operation/Procedure name (s) Procedure(s):  ESOPHAGOGASTRODUODENOSCOPY (EGD) on Shaq DIAZ Juarezsintia   2.   I recognize that during the surgical operation/procedure, unforeseen conditions may necessitate additional or different procedures than those listed above.  I, therefore, further authorize and request that the above-named surgeon, assistants, or designees perform such procedures as are, in their judgment, necessary and desirable.    3.   My surgeon/physician has discussed prior to my surgery the potential benefits, risks and side effects of this procedure; the likelihood of achieving goals; and potential problems that might occur during recuperation.  They also discussed reasonable alternatives to the procedure, including risks, benefits, and side effects related to the alternatives and risks related to not receiving this procedure.  I have had all my questions answered and I acknowledge that no guarantee has been made as to the result that may be obtained.    4.   Should the need arise during my operation/procedure, which includes change of level of care prior to discharge, I also consent to the administration of blood and/or blood products.  Further, I understand that despite careful testing and screening of blood or blood products by collecting agencies, I may still be subject to ill effects as a result of receiving a blood transfusion and/or blood products.  The  following are some, but not all, of the potential risks that can occur: fever and allergic reactions, hemolytic reactions, transmission of diseases such as Hepatitis, AIDS and Cytomegalovirus (CMV) and fluid overload.  In the event that I wish to have an autologous transfusion of my own blood, or a directed donor transfusion, I will discuss this with my physician.  Check only if Refusing Blood or Blood Products  I understand refusal of blood or blood products as deemed necessary by my physician may have serious consequences to my condition to include possible death. I hereby assume responsibility for my refusal and release the hospital, its personnel, and my physicians from any responsibility for the consequences of my refusal.          o  Refuse      5.   I authorize the use of any specimen, organs, tissues, body parts or foreign objects that may be removed from my body during the operation/procedure for diagnosis, research or teaching purposes and their subsequent disposal by hospital authorities.  I also authorize the release of specimen test results and/or written reports to my treating physician on the hospital medical staff or other referring or consulting physicians involved in my care, at the discretion of the Pathologist or my treating physician.    6.   I consent to the photographing or videotaping of the operations or procedures to be performed, including appropriate portions of my body for medical, scientific, or educational purposes, provided my identity is not revealed by the pictures or by descriptive texts accompanying them.  If the procedure has been photographed/videotaped, the surgeon will obtain the original picture, image, videotape or CD.  The hospital will not be responsible for storage, release or maintenance of the picture, image, tape or CD.    7.   I consent to the presence of a  or observers in the operating room as deemed necessary by my physician or their designees.     8.   I recognize that in the event my procedure results in extended X-Ray/fluoroscopy time, I may develop a skin reaction.    9. If I have a Do Not Attempt Resuscitation (DNAR) order in place, that status will be suspended while in the operating room, procedural suite, and during the recovery period unless otherwise explicitly stated by me (or a person authorized to consent on my behalf). The surgeon or my attending physician will determine when the applicable recovery period ends for purposes of reinstating the DNAR order.  10. Patients having a sterilization procedure: I understand that if the procedure is successful the results will be permanent and it will therefore be impossible for me to inseminate, conceive, or bear children.  I also understand that the procedure is intended to result in sterility, although the result has not been guaranteed.   11. I acknowledge that my physician has explained sedation/analgesia administration to me including the risk and benefits I consent to the administration of sedation/analgesia as may be necessary or desirable in the judgment of my physician.    I CERTIFY THAT I HAVE READ AND FULLY UNDERSTAND THE ABOVE CONSENT TO OPERATION and/or OTHER PROCEDURE.    _________________________________________  __________________________________  Signature of Patient     Signature of Responsible Person         ___________________________________         Printed Name of Responsible Person           _________________________________                 Relationship to Patient  _________________________________________  ______________________________  Signature of Witness          Date  Time      Patient Name: Shaq DIAZ Duncan     : 10/2/1940                 Printed: 2025     Medical Record #: [id number]                     Page 1 of [address] [place] [address]  [address]    Consent for Anesthesia    I, Shaq Song agree  to be cared for by an anesthesiologist, who is specially trained to monitor me and give me medicine to put me to sleep or keep me comfortable during my procedure    I understand that my anesthesiologist is not an employee or agent of [facility] or [employer]. He or she works for [employer].    As the patient asking for anesthesia services, I agree to:  Allow the anesthesiologist (anesthesia doctor) to give me medicine and do additional procedures as necessary. Some examples are: Starting or using an “IV” to give me medicine, fluids or blood during my procedure, and having a breathing tube placed to help me breathe when I’m asleep (intubation). In the event that my heart stops working properly, I understand that my anesthesiologist will make every effort to sustain my life, unless otherwise directed by [facility] Do Not Resuscitate documents.  Tell my anesthesia doctor before my procedure:  If I am pregnant.  The last time that I ate or drank.  All of the medicines I take (including prescriptions, herbal supplements, and pills I can buy without a prescription (including street drugs/illegal medications). Failure to inform my anesthesiologist about these medicines may increase my risk of anesthetic complications.  If I am allergic to anything or have had a reaction to anesthesia before.  I understand how the anesthesia medicine will help me (benefits).  I understand that with any type of anesthesia medicine there are risks:  The most common risks are: nausea, vomiting, sore throat, muscle soreness, damage to my eyes, mouth, or teeth (from breathing tube placement).  Rare risks include: remembering what happened during my procedure, allergic reactions to medications, injury to my airway, heart, lungs, vision, nerves, or muscles and in extremely rare instances death.  My doctor has explained to me other choices available to me for my care (alternatives).  Pregnant Patients  (“epidural”):  I understand that the risks of having an epidural (medicine given into my back to help control pain during labor), include itching, low blood pressure, difficulty urinating, headache or slowing of the baby’s heart. Very rare risks include infection, bleeding, seizure, irregular heart rhythms and nerve injury.  Regional Anesthesia (“spinal”, “epidural”, & “nerve blocks”):  I understand that rare but potential complications include headache, bleeding, infection, seizure, irregular heart rhythms, and nerve injury.    I can change my mind about having anesthesia services at any time before I get the medicine.    _____________________________________________________________________________  Patient (or Representative) Signature/Relationship to Patient  Date   Time    _____________________________________________________________________________   Name (if used)    Language/Organization   Time    _____________________________________________________________________________  Anesthesiologist Signature     Date   Time  I have discussed the procedure and information above with the patient (or patient’s representative) and answered their questions. The patient or their representative has agreed to have anesthesia services.    _____________________________________________________________________________  Witness        Date   Time  I have verified that the signature is that of the patient or patient’s representative, and that it was signed before the procedure  Patient Name: Shaq Song     : 10/2/1940                 Printed: 2025     Medical Record #: [id number]                     Page 2 of 2

## (undated) NOTE — LETTER
[facility] [address], [address]  Consent for Procedure/Sedation    Date:     Time:       1.  I authorize the performance upon Adam Barnes the following:  TRANSESOPHAGEAL ECHOCARDIOGRAM    2. I authorize Dr. Jimmy Alford (and Oleksandr Oneil ________________________________    ___________________    Witness: _________________________      Date: ___________________    Printed: 12/10/2018   12:58 PM  Patient Name: Pranav Henry        : 10/2/1940       Medical Record #: [id number]

## (undated) NOTE — LETTER
[facility] [address], [address] [place] [address]    Consent for Anesthesia   1.    Ilir Damico agree to be cared for by an anesthesiologist, who is specially trained to monitor me and give me medicine to put me to sleep or keep me comfort vision, nerves, or muscles and in extremely rare instances death. 5. My doctor has explained to me other choices available to me for my care (alternatives).   6. Pregnant Patients (“epidural”):  I understand that the risks of having an epidural (medicine g

## (undated) NOTE — LETTER
[facility]  [address] [address]  [phone]    Blood Transfusion Consent    In the course of your treatment, it may become necessary to administer a transfusion of blood or blood components. This form provides basic information concerning this procedure and, if signed by you, authorizes its administration. By signing this form, you agree that all of your questions about the administration of blood or blood products have been answered by the ordering medical professional or designee.    Description of Procedure  Blood is introduced into one of your veins, commonly in the arm, using a sterilized disposable needle. The amount of blood transfused, and whether the transfusion will be of blood or blood components is a judgement the physician will make based on your particular needs.    Risks  The transfusion is a common procedure of low risk.  MINOR AND TEMPORARY REACTIONS ARE NOT UNCOMMON, including a slight bruise, swelling or local reaction in the area where the needle pierces your skin, or a nonserious reaction to the transfused material itself, including headache, fever or mild skin reaction, such as rash.  Serious reactions are possible, though very unlikely, and include severe allergic reaction (shock) and destruction (hemolysis) of transfused blood cells.  Infectious diseases which are known to be transmitted by blood transfusion include certain types of viral Hepatitis(liver infection from a virus), Human Immunodeficiency Virus (HIV-1,2) infection, a viral infection known to cause Acquired Immunodeficiency Syndrome (AIDS), as well as certain other bacterial, viral, and parasitic diseases. While a minimal risk of acquiring an infectious disease from transfused blood exists, in accordance with the Federal and State law, all due care has been taken in donor selection and testing to avoid transmission of disease.    Alternatives  If loss of blood poses serious threats during your  treatment, THERE IS NO EFFECTIVE ALTERNATIVE TO BLOOD TRANSFUSION. However, if you have any further questions on this matter, your provider will fully explain the alternatives to you if it has not already been done.    I, ______________________________, have read/had read to me the above. I understand the matters bearing on the decision whether or not to authorize a transfusion of blood or blood components. I have no questions which have not been answered to my full satisfaction. I hereby consent to such transfusion as my physician may deem necessary or advisable in the course of my treatment.    ______________________________________________                    ___________________________  (Signature of Patient or Responsible party in case of minor,                 (Printed Name of Patient or incompetent, or unconscious patient)              Responsible Party)    ___________________________               _____________________  (Relationship to Patient if not self)                                    (Date and Time)    __________________________                                                           ______________________              (Signature of Witness)               (Printed Name of Witness)     Language line ()    Telephone/Verbal/Video Consent    __________________________                     ____________________  (Signature of 2nd Witness           (Printed Name of 2nd  Telephone/Verbal/Video Consent)           Witness)    Patient Name: Shaq Song     : 10/2/1940                 Printed: May 20, 2025     Medical Record #: [id number]      Rev: 2023

## (undated) NOTE — LETTER
12/14/2017          [address] [place] [id number]    Dear Shaq Olivares,       Here are the biopsy/pathology findings from your recent EGD (Upper  Endoscopy):  1. Esophagus biopsies were unremarkable.    2. Prominent (enlarged) veins wer